# Patient Record
Sex: FEMALE | Race: BLACK OR AFRICAN AMERICAN | Employment: FULL TIME | URBAN - METROPOLITAN AREA
[De-identification: names, ages, dates, MRNs, and addresses within clinical notes are randomized per-mention and may not be internally consistent; named-entity substitution may affect disease eponyms.]

---

## 2017-01-24 ENCOUNTER — OFFICE VISIT (OUTPATIENT)
Dept: CARDIOLOGY CLINIC | Age: 47
End: 2017-01-24

## 2017-01-24 VITALS
HEIGHT: 62 IN | SYSTOLIC BLOOD PRESSURE: 108 MMHG | DIASTOLIC BLOOD PRESSURE: 72 MMHG | HEART RATE: 64 BPM | WEIGHT: 253 LBS | BODY MASS INDEX: 46.56 KG/M2 | OXYGEN SATURATION: 97 %

## 2017-01-24 DIAGNOSIS — I25.10 CORONARY ARTERY DISEASE INVOLVING NATIVE CORONARY ARTERY OF NATIVE HEART WITHOUT ANGINA PECTORIS: Primary | ICD-10-CM

## 2017-01-24 DIAGNOSIS — E78.5 DYSLIPIDEMIA: ICD-10-CM

## 2017-01-24 DIAGNOSIS — I10 HYPERTENSION, GOAL BELOW 140/80: ICD-10-CM

## 2017-01-24 NOTE — MR AVS SNAPSHOT
Visit Information Date & Time Provider Department Dept. Phone Encounter #  
 1/24/2017  4:00 PM Lois Joyner MD [facility] [id number] Follow-up Instructions Return in about 1 year (around 1/24/2018). Your Appointments 2/27/2017  9:30 AM  
Follow Up with Mychal Weber NP [address] [facility] ([address] [facility]) Appt Note: 3 month follow up  
 [address] [facility] [id number]  
[address] [facility] [id number] Upcoming Health Maintenance Date Due  
 EYE EXAM RETINAL OR DILATED Q1 1/21/1979 Pneumococcal 19-64 Medium Risk (1 of 1 - PPSV23) 1/21/1988 DTaP/Tdap/Td series (1 - Tdap) 1/21/1990 HEMOGLOBIN A1C Q6M 3/29/2016 MICROALBUMIN Q1 9/29/2016 PAP AKA CERVICAL CYTOLOGY 8/13/2017 LIPID PANEL Q1 10/25/2017 FOOT EXAM Q1 11/2/2017 Allergies as of 1/24/2017  Review Complete On: 1/24/2017 By: Brennan Hernandez Severity Noted Reaction Type Reactions Metformin  09/09/2015   Side Effect Nausea Only Current Immunizations  Never Reviewed Name Date Influenza Vaccine (Quad) PF 10/25/2016 Not reviewed this visit You Were Diagnosed With   
  
 Codes Comments Coronary artery disease involving native coronary artery of native heart without angina pectoris    -  Primary ICD-10-CM: I25.10 ICD-9-CM: 414.01 Vitals BP Pulse Height(growth percentile) Weight(growth percentile) SpO2 BMI  
 108/72 64 5' 2\" (1.575 m) 253 lb (114.8 kg) 97% 46.27 kg/m2 OB Status Smoking Status Having regular periods Never Smoker Vitals History BMI and BSA Data Body Mass Index Body Surface Area  
 46.27 kg/m 2 2.24 m 2 Preferred Pharmacy Pharmacy Name Phone [phone] [address] [phone] Your Updated Medication List  
  
   
 This list is accurate as of: 1/24/17  4:32 PM.  Always use your most recent med list.  
  
  
  
  
 aspirin delayed-release 81 mg tablet Take 1 Tab by mouth daily. dexlansoprazole 30 mg capsule Commonly known as:  DEXILANT Take 1 Cap by mouth daily as needed. docusate sodium 100 mg capsule Commonly known as:  Henreitta Sidney Take 1 Cap by mouth two (2) times a day. Indications: CONSTIPATION  
  
 ergocalciferol 50,000 unit capsule Commonly known as:  VITAMIN D2 Take 1 Cap by mouth every seven (7) days. magnesium oxide 400 mg tablet Commonly known as:  MAG-OX Take 1 Tab by mouth daily. metoprolol tartrate 25 mg tablet Commonly known as:  LOPRESSOR  
TAKE ONE-HALF TABLET BY MOUTH TWICE DAILY(GENERIC FOR LOPRESSOR)  
  
 multivitamin tablet Commonly known as:  ONE A DAY Take 1 Tab by mouth daily. naltrexone-buPROPion 8-90 mg Tber ER tablet Commonly known as:  Andrés Mano Take 2 Tabs by mouth two (2) times a day. Indications: WEIGHT LOSS MANAGEMENT FOR OBESE PATIENT (BMI >= 30)  
  
 nitroglycerin 0.4 mg SL tablet Commonly known as:  NITROSTAT Place 1 tablet under tongue at the start of chest pain every 5 minutes up to 3 doses. olmesartan-hydroCHLOROthiazide 20-12.5 mg per tablet Commonly known as:  BENICAR HCT Take 1 Tab by mouth daily. omega-3 acid ethyl esters 1 gram capsule Commonly known as:  Polina Maroon Take 1 Cap by mouth nightly. pitavastatin 4 mg Tab tablet Commonly known as:  LIVALO Take 1 Tab by mouth nightly. We Performed the Following AMB POC EKG ROUTINE W/ 12 LEADS, INTER & REP [57605 CPT(R)] Follow-up Instructions Return in about 1 year (around 1/24/2018). Introducing [facility]! Montez Erickson introduces [email or web address] patient portal. Now you can access parts of your medical record, email your doctor's office, and request medication refills online.    
 
1. In your internet browser, go to [email or web address] 2. Click on the First Time User? Click Here link in the Sign In box. You will see the New Member Sign Up page. 3. Enter your [employer] Access Code exactly as it appears below. You will not need to use this code after youve completed the sign-up process. If you do not sign up before the expiration date, you must request a new code. · [employer] Access Code: [email or web address] Expires: 1/31/2017  2:31 PM 
 
4. Enter the last four digits of your Social Security Number (xxxx) and Date of Birth (mm/dd/yyyy) as indicated and click Submit. You will be taken to the next sign-up page. 5. Create a [employer] ID. This will be your [employer] login ID and cannot be changed, so think of one that is secure and easy to remember. 6. Create a [employer] password. You can change your password at any time. 7. Enter your Password Reset Question and Answer. This can be used at a later time if you forget your password. 8. Enter your e-mail address. You will receive e-mail notification when new information is available in [address] 9. Click Sign Up. You can now view and download portions of your medical record. 10. Click the Download Summary menu link to download a portable copy of your medical information. If you have questions, please visit the Frequently Asked Questions section of the [employer] website. Remember, [employer] is NOT to be used for urgent needs. For medical emergencies, dial 911. Now available from your iPhone and Android! Please provide this summary of care documentation to your next provider. Your primary care clinician is listed as Tova Gore. If you have any questions after today's visit, please call [phone].

## 2017-01-24 NOTE — PROGRESS NOTES
1. Have you been to the ER, urgent care clinic since your last visit? Hospitalized since your last visit? S/p cath 11/22/16  2. Have you seen or consulted any other health care providers outside of the [address] [place] [address] since your last visit? Include any pap smears or colon screening.  NO

## 2017-01-24 NOTE — PROGRESS NOTES
HISTORY OF PRESENT ILLNESS  Yobani Hein is a 49 y.o. female. ASSESSMENT and PLAN    Ms. Portillo Cruz has history of CAD. She presented back in December 2011 with atypical chest pains but abnormal EKG. Her coronary angiogram revealed ostial 90-95% LAD stenosis. She subsequently underwent single vessel LIMA to LAD bypass surgery. Since her bypass surgery, she presented twice with chest pains to the emergency room which were again quite atypical. She had nuclear scans done both instances which were normal.  Back in November 2016, she presented to [facility] with chest pain. Nuclear scan showed ejection fraction of 60% but apical filling defect. Ultimately, repeat coronary angiography was performed. It was noted that her HECTOR to LAD was widely patent.  CAD:   Symptomatically stable.  BP:   Well-controlled.  HR:    Stable.  CHF:   Currently, there is no evidence of decompensated CHF noted.  Weight:   In January 2016, she weighed 276 pounds. With careful dieting, she was able lose some weight. Her weight today is 253 pounds.  Cholesterol:   Target LDL <70. She remains on Crestor 40 mg daily.  Anti-platelet:   Remains on ASA. I will see her back in one year. Thank you. Encounter Diagnoses   Name Primary?  Coronary artery disease involving native coronary artery of native heart without angina pectoris Yes    Hypertension, goal below 140/80     Dyslipidemia      current treatment plan is effective, no change in therapy  lab results and schedule of future lab studies reviewed with patient  reviewed diet, exercise and weight control  cardiovascular risk and specific lipid/LDL goals reviewed  use of aspirin to prevent MI and TIA's discussed      HPI   Today, Ms. Jeraline Halsted has no complaints of chest pains, exertional chest pains, increased shortness of breath, or decreased exercise capacity. She denies any orthopnea or PND.   She denies any palpitations or dizziness. Review of Systems   Respiratory: Negative for shortness of breath. Cardiovascular: Negative for chest pain, palpitations, orthopnea, claudication, leg swelling and PND. All other systems reviewed and are negative. Physical Exam   Constitutional: She is oriented to person, place, and time. She appears well-developed and well-nourished. HENT:   Head: Normocephalic. Eyes: Conjunctivae are normal.   Neck: Neck supple. No JVD present. Carotid bruit is not present. No thyromegaly present. Cardiovascular: Normal rate and regular rhythm. No murmur heard. Pulmonary/Chest: Breath sounds normal.   Abdominal: Bowel sounds are normal.   Musculoskeletal: She exhibits no edema. Neurological: She is alert and oriented to person, place, and time. Skin: Skin is warm and dry. Nursing note and vitals reviewed. PCP: Leda Medeiros NP    Past Medical History   Diagnosis Date    Abnormal PFT 10/1/2015     Dr. Noa Schroeder, Pulmonology    Acid reflux     CABG x 1 12/31/2011     LIMA-LAD    CAD (coronary artery disease)     Cardiac cath 11/23/2016     R dom. oLAD 85%. Otherwise patent coronaries. HECTOR to LAD patent. LVEDP 22 mmHg. EF 55%. No RWMA.  Cardiac echocardiogram 07/28/2015     Tech difficult. EF 50-55%. No WMA. Mild conc LVH. Gr 2 DDfx. RVSP 45-50 mmHg. Mild LAE. Mild ROSITA. Mod TR. Mild IVCE.  Cardiac nuclear imaging test, high risk 11/23/2016     High risk. Mainly fixed anteroapical defect w/partial reversibility. EF 60%. No RWMA. Nondiagnostic EKG on pharm stress test.    Cardiovascular upper extremity arterial duplex 01/03/2014     No significant occlusive arterial disease bilaterally.     Coronary atherosclerosis of native coronary artery     Diabetes mellitus ([facility] [address] 9/22/14     hgbA1C 6.8    Dyslipidemia     GERD (gastroesophageal reflux disease)     H/O screening mammography 12/06/2016     no evidence of malignancy    Hypomagnesemia 14     1.4    Pleural effusion, left      s/p CABG and thoracentesis with removal of 900 mL    Pulmonary arterial hypertension (HCC)        Past Surgical History   Procedure Laterality Date    Hx  section      Hx coronary artery bypass graft  12/31/2011     x1    Pr cardiac surg procedure unlist      Cardiac catheterization  2016          Coronary artery angiogram  2016          Lv angiography  2016             Current Outpatient Prescriptions   Medication Sig Dispense Refill    metoprolol tartrate (LOPRESSOR) 25 mg tablet TAKE ONE-HALF TABLET BY MOUTH TWICE DAILY(GENERIC FOR LOPRESSOR) 30 Tab 3    magnesium oxide (MAG-OX) 400 mg tablet Take 1 Tab by mouth daily. 30 Tab 11    dexlansoprazole (DEXILANT) 30 mg capsule Take 1 Cap by mouth daily as needed. 90 Cap 3    ergocalciferol (VITAMIN D2) 50,000 unit capsule Take 1 Cap by mouth every seven (7) days. 4 Cap 11    olmesartan-hydroCHLOROthiazide (BENICAR HCT) 20-12.5 mg per tablet Take 1 Tab by mouth daily. 90 Tab 3    naltrexone-buPROPion (CONTRAVE) 8-90 mg TbER ER tablet Take 2 Tabs by mouth two (2) times a day. Indications: WEIGHT LOSS MANAGEMENT FOR OBESE PATIENT (BMI >= 30) 360 Tab 3    pitavastatin (LIVALO) 4 mg tab tablet Take 1 Tab by mouth nightly. 90 Tab 3    docusate sodium (COLACE) 100 mg capsule Take 1 Cap by mouth two (2) times a day. Indications: CONSTIPATION 60 Cap 11    nitroglycerin (NITROSTAT) 0.4 mg SL tablet Place 1 tablet under tongue at the start of chest pain every 5 minutes up to 3 doses. 1 Bottle 3    aspirin delayed-release 81 mg tablet Take 1 Tab by mouth daily. 30 Tab 11    multivitamin (ONE A DAY) tablet Take 1 Tab by mouth daily. 30 Tab 11    omega-3 acid ethyl esters (LOVAZA) 1 gram capsule Take 1 Cap by mouth nightly.  90 Cap 3       The patient has a family history of    Social History   Substance Use Topics    Smoking status: Never Smoker    Smokeless tobacco: Never Used Comment: 2nd home smoking from mother during 1st 17 yrs of life    Alcohol use No       Lab Results   Component Value Date/Time    Cholesterol, total 141 10/25/2016 09:50 AM    Cholesterol, Total 137 08/18/2014 07:38 AM    HDL Cholesterol 57 10/25/2016 09:50 AM    HDL Cholesterol 59 08/18/2014 07:38 AM    LDL, calculated 69.4 10/25/2016 09:50 AM    LDL Cholesterol 65 08/18/2014 07:38 AM    Triglyceride 73 10/25/2016 09:50 AM    CHOL/HDL Ratio 2.5 10/25/2016 09:50 AM        BP Readings from Last 3 Encounters:   01/24/17 108/72   11/30/16 112/73   11/24/16 108/69        Pulse Readings from Last 3 Encounters:   01/24/17 64   11/30/16 65   11/24/16 63       Wt Readings from Last 3 Encounters:   01/24/17 114.8 kg (253 lb)   11/30/16 116.1 kg (256 lb)   11/23/16 116.5 kg (256 lb 13.4 oz)         EKG: unchanged from previous tracings, normal sinus rhythm, nonspecific ST and T waves changes, with T-wave inversions mostly laterally.

## 2017-02-02 ENCOUNTER — TELEPHONE (OUTPATIENT)
Dept: FAMILY MEDICINE CLINIC | Age: 47
End: 2017-02-02

## 2017-02-02 NOTE — TELEPHONE ENCOUNTER
Medication: Lovaza 1gm, dose: 1 caps, how often: QD, current number of medication days provided: 90, refill per application. Lot #: [id number], EXP 02/2018  . This medication was received and verified for the following 1. Correct Patient, 2. Correct Diagnosis, 3. Correct Drug, 4. Correct route, and no current allergy to medication. Medication: Livalo , dose: 4 mg, how often: Daily , current number of medication days provided: 90, refill per application. Lot #: [id number], EXP 02/2018. This medication was received and verified for the following 1. Correct Patient, 2. Correct Diagnosis, 3. Correct Drug, 4. Correct route, and no current allergy to medication. Please contact patient to come  their medications.      Shaheen Carrillo, MSN, RN, [facility]

## 2017-02-07 ENCOUNTER — OFFICE VISIT (OUTPATIENT)
Dept: FAMILY MEDICINE CLINIC | Age: 47
End: 2017-02-07

## 2017-02-07 ENCOUNTER — HOSPITAL ENCOUNTER (OUTPATIENT)
Dept: LAB | Age: 47
Discharge: HOME | End: 2017-02-07
Payer: COMMERCIAL

## 2017-02-07 VITALS
HEIGHT: 62 IN | TEMPERATURE: 98.3 F | BODY MASS INDEX: 46.33 KG/M2 | SYSTOLIC BLOOD PRESSURE: 110 MMHG | RESPIRATION RATE: 16 BRPM | DIASTOLIC BLOOD PRESSURE: 76 MMHG | HEART RATE: 73 BPM | WEIGHT: 251.8 LBS | OXYGEN SATURATION: 96 %

## 2017-02-07 DIAGNOSIS — K21.9 GASTROESOPHAGEAL REFLUX DISEASE WITHOUT ESOPHAGITIS: ICD-10-CM

## 2017-02-07 DIAGNOSIS — I10 ESSENTIAL HYPERTENSION: ICD-10-CM

## 2017-02-07 DIAGNOSIS — E11.9 TYPE 2 DIABETES MELLITUS WITHOUT COMPLICATION, WITHOUT LONG-TERM CURRENT USE OF INSULIN (HCC): ICD-10-CM

## 2017-02-07 DIAGNOSIS — N91.2 AMENORRHEA: ICD-10-CM

## 2017-02-07 DIAGNOSIS — E55.9 VITAMIN D DEFICIENCY: ICD-10-CM

## 2017-02-07 DIAGNOSIS — I10 HYPERTENSION, GOAL BELOW 140/80: ICD-10-CM

## 2017-02-07 DIAGNOSIS — E78.5 DYSLIPIDEMIA: ICD-10-CM

## 2017-02-07 DIAGNOSIS — E11.9 TYPE 2 DIABETES MELLITUS WITHOUT COMPLICATION, WITHOUT LONG-TERM CURRENT USE OF INSULIN (HCC): Primary | ICD-10-CM

## 2017-02-07 LAB
EST. AVERAGE GLUCOSE BLD GHB EST-MCNC: 140 MG/DL
HBA1C MFR BLD: 6.5 % (ref 4.2–5.6)
HCG URINE, QL. (POC): NEGATIVE
VALID INTERNAL CONTROL?: YES

## 2017-02-07 PROCEDURE — 83036 HEMOGLOBIN GLYCOSYLATED A1C: CPT | Performed by: NURSE PRACTITIONER

## 2017-02-07 PROCEDURE — 82043 UR ALBUMIN QUANTITATIVE: CPT | Performed by: NURSE PRACTITIONER

## 2017-02-07 PROCEDURE — 82306 VITAMIN D 25 HYDROXY: CPT | Performed by: NURSE PRACTITIONER

## 2017-02-07 RX ORDER — DEXLANSOPRAZOLE 30 MG/1
30 CAPSULE, DELAYED RELEASE ORAL
Qty: 90 CAP | Refills: 1 | Status: SHIPPED | OUTPATIENT
Start: 2017-02-07 | End: 2017-12-16 | Stop reason: SDUPTHER

## 2017-02-07 RX ORDER — OLMESARTAN MEDOXOMIL AND HYDROCHLOROTHIAZIDE 20/12.5 20; 12.5 MG/1; MG/1
1 TABLET ORAL DAILY
Qty: 90 TAB | Refills: 3 | Status: SHIPPED | OUTPATIENT
Start: 2017-02-07 | End: 2018-02-13 | Stop reason: SDUPTHER

## 2017-02-07 NOTE — PROGRESS NOTES
SUBJECTIVE:  49 y.o. female for follow up of diabetes transfer patient from [facility]. Diabetic Review of Systems - medication compliance: compliant all of the time, diabetic diet compliance: compliant most of the time, home glucose monitoring: is not performed, further diabetic ROS: no polyuria or polydipsia, no chest pain, dyspnea or TIA's, no numbness, tingling or pain in extremities, last eye exam approximately overdue will put in referral today and patient will make own appointment. acute symptoms are none. Other symptoms and concerns: no menses. Patient is managed by cardiology for CAD   Patient also has history of hypovitaminosis d   Patient has history GERD  Current Outpatient Prescriptions   Medication Sig Dispense Refill    metoprolol tartrate (LOPRESSOR) 25 mg tablet TAKE ONE-HALF TABLET BY MOUTH TWICE DAILY(GENERIC FOR LOPRESSOR) 30 Tab 3    magnesium oxide (MAG-OX) 400 mg tablet Take 1 Tab by mouth daily. 30 Tab 11    dexlansoprazole (DEXILANT) 30 mg capsule Take 1 Cap by mouth daily as needed. 90 Cap 3    ergocalciferol (VITAMIN D2) 50,000 unit capsule Take 1 Cap by mouth every seven (7) days. 4 Cap 11    olmesartan-hydroCHLOROthiazide (BENICAR HCT) 20-12.5 mg per tablet Take 1 Tab by mouth daily. 90 Tab 3    naltrexone-buPROPion (CONTRAVE) 8-90 mg TbER ER tablet Take 2 Tabs by mouth two (2) times a day. Indications: WEIGHT LOSS MANAGEMENT FOR OBESE PATIENT (BMI >= 30) 360 Tab 3    pitavastatin (LIVALO) 4 mg tab tablet Take 1 Tab by mouth nightly. 90 Tab 3    docusate sodium (COLACE) 100 mg capsule Take 1 Cap by mouth two (2) times a day. Indications: CONSTIPATION 60 Cap 11    nitroglycerin (NITROSTAT) 0.4 mg SL tablet Place 1 tablet under tongue at the start of chest pain every 5 minutes up to 3 doses. 1 Bottle 3    aspirin delayed-release 81 mg tablet Take 1 Tab by mouth daily. 30 Tab 11    multivitamin (ONE A DAY) tablet Take 1 Tab by mouth daily.  30 Tab 11    omega-3 acid ethyl esters (LOVAZA) 1 gram capsule Take 1 Cap by mouth nightly. 90 Cap 3     Wt Readings from Last 3 Encounters:   02/07/17 251 lb 12.8 oz (114.2 kg)   01/24/17 253 lb (114.8 kg)   11/30/16 256 lb (116.1 kg)     BP Readings from Last 3 Encounters:   02/07/17 110/76   01/24/17 108/72   11/30/16 112/73       OBJECTIVE:  Awake and alert in no acute distress  Neck supple without lymphadenopathy, no carotid artery bruits auscultated bilaterally. No thyromegaly  Lungs clear throughout  S1 S2 RRR without ectopy or murmur auscultated. Extremities: no clubbing, cyanosis, peripheral edema  Integumentary: no rashes  Reviewed vital signs   Visit Vitals    /76 (BP 1 Location: Right arm, BP Patient Position: Sitting)    Pulse 73    Temp 98.3 °F (36.8 °C) (Oral)    Resp 16    Ht 5' 2\" (1.575 m)    Wt 251 lb 12.8 oz (114.2 kg)    SpO2 96%    BMI 46.05 kg/m2       Results for orders placed or performed in visit on 02/07/17   AMB POC URINE PREGNANCY TEST, VISUAL COLOR COMPARISON   Result Value Ref Range    VALID INTERNAL CONTROL POC Yes     HCG urine, Ql. (POC) Negative Negative       Donnie Soulier was seen today for diabetes and missed menses. Diagnoses and all orders for this visit:    Type 2 diabetes mellitus without complication, without long-term current use of insulin ([facility])  -      DIABETES EYE EXAM  -     MICROALBUMIN, UR, RAND W/ MICROALBUMIN/CREA RATIO; Future  -     Cancel: AMB POC HEMOGLOBIN A1C  -     HEMOGLOBIN A1C WITH EAG; Future  -     olmesartan-hydroCHLOROthiazide (BENICAR HCT) 20-12.5 mg per tablet; Take 1 Tab by mouth daily.  -     LIPID PANEL; Future    Amenorrhea  -     AMB POC URINE PREGNANCY TEST, VISUAL COLOR COMPARISON    Vitamin D deficiency  -     VITAMIN D, 25 HYDROXY; Future    Hypertension, goal below 140/80  -     olmesartan-hydroCHLOROthiazide (BENICAR HCT) 20-12.5 mg per tablet; Take 1 Tab by mouth daily.     Gastroesophageal reflux disease without esophagitis  -     dexlansoprazole (DEXILANT) 30 mg capsule; Take 1 Cap by mouth daily as needed. Dyslipidemia  -     pitavastatin (LIVALO) 4 mg tab tablet; Take 1 Tab by mouth nightly. Essential hypertension  -     olmesartan-hydroCHLOROthiazide (BENICAR HCT) 20-12.5 mg per tablet; Take 1 Tab by mouth daily. Reinforced ADA diet and exercise. Portion control and exercise healthy eating general guidelines reviewed with patient to get closer to normal BMI  Health maintenance reviewed  Patient verbalizes understanding. I have discussed the diagnosis with the patient and the intended plan as seen in the above orders. The patient has received an after-visit summary and questions were answered concerning future plans. I have discussed medication side effects and warnings with the patient as well. Follow-up Disposition:  Return for schedule [facility] with Rocio

## 2017-02-07 NOTE — PROGRESS NOTES
1. Have you been to the ER, urgent care clinic since your last visit? Hospitalized since your last visit? No    2. Have you seen or consulted any other health care providers outside of the [address] [place] [address] since your last visit? Include any pap smears or colon screening. No    3. Would you like to have a Flu Shot Today?  No already had one

## 2017-02-07 NOTE — MR AVS SNAPSHOT
Visit Information Date & Time Provider Department Dept. Phone Encounter #  
 2/7/2017  9:40 AM Kera Leo, [address] [phone] Follow-up Instructions Return for schedule [facility] with [facility]. Your Appointments 2/27/2017  9:30 AM  
Follow Up with Lamin Underwood, ERICK [address] [facility] ([address]) Appt Note: 3 month follow up  
 [address] [facility] [phone]  
[address] [facility] [id number] Upcoming Health Maintenance Date Due  
 EYE EXAM RETINAL OR DILATED Q1 1/21/1979 Pneumococcal 19-64 Medium Risk (1 of 1 - PPSV23) 1/21/1988 DTaP/Tdap/Td series (1 - Tdap) 1/21/1990 HEMOGLOBIN A1C Q6M 3/29/2016 MICROALBUMIN Q1 9/29/2016 PAP AKA CERVICAL CYTOLOGY 8/13/2017 LIPID PANEL Q1 10/25/2017 FOOT EXAM Q1 11/2/2017 Allergies as of 2/7/2017  Review Complete On: 2/7/2017 By: Kera Leo NP Severity Noted Reaction Type Reactions Metformin  09/09/2015   Side Effect Nausea Only Current Immunizations  Reviewed on 2/7/2017 Name Date Influenza Vaccine (Quad) PF 10/25/2016 Reviewed by Kera Leo NP on 2/7/2017 at 10:09 AM  
You Were Diagnosed With   
  
 Codes Comments Type 2 diabetes mellitus without complication, without long-term current use of insulin (HCC)    -  Primary ICD-10-CM: E11.9 ICD-9-CM: 250.00 Amenorrhea     ICD-10-CM: N91.2 ICD-9-CM: 626.0 Vitamin D deficiency     ICD-10-CM: E55.9 ICD-9-CM: 268.9 Hypertension, goal below 140/80     ICD-10-CM: I10 
ICD-9-CM: 401.9 Gastroesophageal reflux disease without esophagitis     ICD-10-CM: K21.9 ICD-9-CM: 530.81 Dyslipidemia     ICD-10-CM: E78.5 ICD-9-CM: 272.4 Essential hypertension     ICD-10-CM: I10 
ICD-9-CM: 401.9 Vitals BP Pulse Temp Resp Height(growth percentile) Weight(growth percentile) 110/76 (BP 1 Location: Right arm, BP Patient Position: Sitting) 73 98.3 °F (36.8 °C) (Oral) 16 5' 2\" (1.575 m) 251 lb 12.8 oz (114.2 kg) LMP SpO2 BMI OB Status Smoking Status 02/01/2016 (Exact Date) 96% 46.05 kg/m2 Having regular periods Never Smoker BMI and BSA Data Body Mass Index Body Surface Area 46.05 kg/m 2 2.24 m 2 Preferred Pharmacy Pharmacy Name Phone [facility] [address] [phone] Your Updated Medication List  
  
   
This list is accurate as of: 2/7/17 10:09 AM.  Always use your most recent med list.  
  
  
  
  
 aspirin delayed-release 81 mg tablet Take 1 Tab by mouth daily. dexlansoprazole 30 mg capsule Commonly known as:  DEXILANT Take 1 Cap by mouth daily as needed. docusate sodium 100 mg capsule Commonly known as:  [place] Take 1 Cap by mouth two (2) times a day. Indications: CONSTIPATION  
  
 magnesium oxide 400 mg tablet Commonly known as:  MAG-OX Take 1 Tab by mouth daily. metoprolol tartrate 25 mg tablet Commonly known as:  LOPRESSOR  
TAKE ONE-HALF TABLET BY MOUTH TWICE DAILY(GENERIC FOR LOPRESSOR)  
  
 multivitamin tablet Commonly known as:  ONE A DAY Take 1 Tab by mouth daily. naltrexone-buPROPion 8-90 mg Tber ER tablet Commonly known as:  Gabby Ro Take 2 Tabs by mouth two (2) times a day. Indications: WEIGHT LOSS MANAGEMENT FOR OBESE PATIENT (BMI >= 30)  
  
 nitroglycerin 0.4 mg SL tablet Commonly known as:  NITROSTAT Place 1 tablet under tongue at the start of chest pain every 5 minutes up to 3 doses. olmesartan-hydroCHLOROthiazide 20-12.5 mg per tablet Commonly known as:  BENICAR HCT Take 1 Tab by mouth daily. omega-3 acid ethyl esters 1 gram capsule Commonly known as:  Anthonette Rolling Take 1 Cap by mouth nightly. pitavastatin 4 mg Tab tablet Commonly known as:  LIVALO Take 1 Tab by mouth nightly. Prescriptions Sent to Pharmacy Refills  
 olmesartan-hydroCHLOROthiazide (BENICAR HCT) 20-12.5 mg per tablet 3 Sig: Take 1 Tab by mouth daily. Class: Normal  
 Pharmacy: [place] [address]. Ph #: [phone] Route: Oral  
 dexlansoprazole (DEXILANT) 30 mg capsule 1 Sig: Take 1 Cap by mouth daily as needed. Class: Normal  
 Pharmacy: [place] [id number]. Ph #: [phone] Route: Oral  
 pitavastatin (LIVALO) 4 mg tab tablet 1 Sig: Take 1 Tab by mouth nightly. Class: Normal  
 Pharmacy: [place] [address]. Ph #: [phone] Route: Oral  
  
We Performed the Following AMB POC URINE PREGNANCY TEST, VISUAL COLOR COMPARISON [53257 CPT(R)]  DIABETES EYE EXAM [6 Custom] REFERRAL TO OPTOMETRY [id number] Custom] Comments:  
 Please evaluate patient for annual diabetic eye examination [facility] Amrit Croft location Patient will make appointment. Follow-up Instructions Return for schedule SANDRA with Roc[facility] To-Do List   
 02/07/2017 Lab:  HEMOGLOBIN A1C WITH EAG   
  
 02/07/2017 Lab:  LIPID PANEL   
  
 02/07/2017 Lab:  MICROALBUMIN, UR, RAND W/ MICROALBUMIN/CREA RATIO   
  
 02/07/2017 Lab:  VITAMIN D, 25 HYDROXY Referral Information Referral ID Referred By Referred To  
  
 [id number] [facility] Cheung Not Available Visits Status Start Date End Date 1 New Request 2/7/17 2/7/18 If your referral has a status of pending review or denied, additional information will be sent to support the outcome of this decision. Patient Instructions Body Mass Index: Care Instructions Your Care Instructions Body mass index (BMI) can help you see if your weight is raising your risk for health problems. It uses a formula to compare how much you weigh with how tall you are. A BMI between 18.5 and 24.9 is considered healthy. A BMI between 25 and 29.9 is considered overweight. A BMI of 30 or higher is considered obese. If your BMI is in the normal range, it means that you have a lower risk for weight-related health problems. If your BMI is in the overweight or obese range, you may be at increased risk for weight-related health problems, such as high blood pressure, heart disease, stroke, arthritis or joint pain, and diabetes. BMI is just one measure of your risk for weight-related health problems. You may be at higher risk for health problems if you are not active, you eat an unhealthy diet, or you drink too much alcohol or use tobacco products. Follow-up care is a key part of your treatment and safety. Be sure to make and go to all appointments, and call your doctor if you are having problems. It's also a good idea to know your test results and keep a list of the medicines you take. How can you care for yourself at home? · Practice healthy eating habits. This includes eating plenty of fruits, vegetables, whole grains, lean protein, and low-fat dairy. · Get at least 30 minutes of exercise 5 days a week or more. Brisk walking is a good choice. You also may want to do other activities, such as running, swimming, cycling, or playing tennis or team sports. · Do not smoke. Smoking can increase your risk for health problems. If you need help quitting, talk to your doctor about stop-smoking programs and medicines. These can increase your chances of quitting for good. · Limit alcohol to 2 drinks a day for men and 1 drink a day for women. Too much alcohol can cause health problems. If you have a BMI higher than 25 · Your doctor may do other tests to check your risk for weight-related health problems.  This may include measuring the distance around your waist. A waist measurement of more than 40 inches in men or 35 inches in women can increase the risk of weight-related health problems. · Talk with your doctor about steps you can take to stay healthy or improve your health. You may need to make lifestyle changes to lose weight and stay healthy, such as changing your diet and getting regular exercise. Where can you learn more? Go to http://yane-cooper.info/. Enter S176 in the search box to learn more about \"Body Mass Index: Care Instructions. \" Current as of: February 16, 2016 Content Version: 11.1 © 2099-1452 [employer]. Care instructions adapted under license by [employer] (which disclaims liability or warranty for this information). If you have questions about a medical condition or this instruction, always ask your healthcare professional. [address] any warranty or liability for your use of this information. Nutrition Tips for Diabetes: After Your Visit Your Care Instructions A healthy diet is important to manage diabetes. It helps you lose weight (if you need to) and keep it off. It gives you the nutrition and energy your body needs and helps prevent heart disease. But a diet for diabetes does not mean that you have to eat special foods. You can eat what your family eats, including occasional sweets and other favorites. But you do have to pay attention to how often you eat and how much you eat of certain foods. The right plan for you will give you meals that help you keep your blood sugar at healthy levels. Try to eat a variety of foods and to spread carbohydrate throughout the day. Carbohydrate raises blood sugar higher and more quickly than any other nutrient does. Carbohydrate is found in sugar, breads and cereals, fruit, starchy vegetables such as potatoes and corn, and milk and yogurt.  
You may want to work with a dietitian or diabetes educator to help you plan meals and snacks. A dietitian or diabetes educator also can help you lose weight if that is one of your goals. The following tips can help you enjoy your meals and stay healthy. Follow-up care is a key part of your treatment and safety. Be sure to make and go to all appointments, and call your doctor if you are having problems. Its also a good idea to know your test results and keep a list of the medicines you take. How can you care for yourself at home? · Learn which foods have carbohydrate and how much carbohydrate to eat. A dietitian or diabetes educator can help you learn to keep track of how much carbohydrate you eat. · Spread carbohydrate throughout the day. Eat some carbohydrate at all meals, but do not eat too much at any one time. · Plan meals to include food from all the food groups. These are the food groups and some example portion sizes: ¨ Grains: 1 slice of bread (1 ounce), ½ cup of cooked cereal, and 1/3 cup of cooked pasta or rice. These have about 15 grams of carbohydrate in a serving. Choose whole grains such as whole wheat bread or crackers, oatmeal, and brown rice more often than refined grains. ¨ Fruit: 1 small fresh fruit, such as an apple or orange; ½ of a banana; ½ cup of chopped, cooked, or canned fruit; ½ cup of fruit juice; 1 cup of melon or raspberries; and 2 tablespoons of dried fruit. These have about 15 grams of carbohydrate in a serving. ¨ Dairy: 1 cup of nonfat or low-fat milk and 2/3 cup of plain yogurt. These have about 15 grams of carbohydrate in a serving. ¨ Protein foods: Beef, chicken, turkey, fish, eggs, tofu, cheese, cottage cheese, and peanut butter. A serving size of meat is 3 ounces, which is about the size of a deck of cards. Examples of meat substitute serving sizes (equal to 1 ounce of meat) are 1/4 cup of cottage cheese, 1 egg, 1 tablespoon of peanut butter, and ½ cup of tofu. These have very little or no carbohydrate per serving. ¨ Vegetables: Starchy vegetables such as ½ cup of cooked dried beans, peas, potatoes, or corn have about 15 grams of carbohydrate. Nonstarchy vegetables have very little carbohydrate, such as 1 cup of raw leafy vegetables (such as spinach), ½ cup of other vegetables (cooked or chopped), and 3/4 cup of vegetable juice. · Use the plate format to plan meals. It is a good, quick way to make sure that you have a balanced meal. It also helps you spread carbohydrate throughout the day. You divide your plate by types of foods. Put vegetables on half the plate, meat or meat substitutes on one-quarter of the plate, and a grain or starchy vegetable (such as brown rice or a potato) in the final quarter of the plate. To this you can add a small piece of fruit and 1 cup of milk or yogurt, depending on how much carbohydrate you are supposed to eat at a meal. 
· Talk to your dietitian or diabetes educator about ways to add limited amounts of sweets into your meal plan. You can eat these foods now and then, as long as you include the amount of carbohydrate they have in your daily carbohydrate allowance. · If you drink alcohol, limit it to no more than 1 drink a day for women and 2 drinks a day for men. If you are pregnant, no amount of alcohol is known to be safe. · Protein, fat, and fiber do not raise blood sugar as much as carbohydrate does. If you eat a lot of these nutrients in a meal, your blood sugar will rise more slowly than it would otherwise. · Limit saturated fats, such as those from meat and dairy products. Try to replace it with monounsaturated fat, such as olive oil. This is a healthier choice because people who have diabetes are at higher-than-average risk of heart disease. But use a modest amount of olive oil. A tablespoon of olive oil has 14 grams of fat and 120 calories. · Exercise lowers blood sugar.  If you take insulin by shots or pump, you can use less than you would if you were not exercising. Keep in mind that timing matters. If you exercise within 1 hour after a meal, your body may need less insulin for that meal than it would if you exercised 3 hours after the meal. Test your blood sugar to find out how exercise affects your need for insulin. · Exercise on most days of the week. Aim for at least 30 minutes. Exercise helps you stay at a healthy weight and helps your body use insulin. Walking is an easy way to get exercise. Gradually increase the amount you walk every day. You also may want to swim, bike, or do other activities. When you eat out · Learn to estimate the serving sizes of foods that have carbohydrate. If you measure food at home, it will be easier to estimate the amount in a serving of restaurant food. · If the meal you order has too much carbohydrate (such as potatoes, corn, or baked beans), ask to have a low-carbohydrate food instead. Ask for a salad or green vegetables. · If you use insulin, check your blood sugar before and after eating out to help you plan how much to eat in the future. · If you eat more carbohydrate at a meal than you had planned, take a walk or do other exercise. This will help lower your blood sugar. Where can you learn more? Go to [employer].be Enter X324 in the search box to learn more about \"Nutrition Tips for Diabetes: After Your Visit. \"  
© 0222-0289 [employer]. Care instructions adapted under license by Óscar Pollock (which disclaims liability or warranty for this information). This care instruction is for use with your licensed healthcare professional. If you have questions about a medical condition or this instruction, always ask your healthcare professional. [place] [address] any warranty or liability for your use of this information. Content Version: 02.7.873086; Current as of: June 4, 2014 Introducing [facility]! [facility] introduces [employer] patient portal. Now you can access parts of your medical record, email your doctor's office, and request medication refills online. 1. In your internet browser, go to [email or web address] 2. Click on the First Time User? Click Here link in the Sign In box. You will see the New Member Sign Up page. 3. Enter your [employer] Access Code exactly as it appears below. You will not need to use this code after youve completed the sign-up process. If you do not sign up before the expiration date, you must request a new code. · [employer] Access Code: [email or web address] Expires: 5/8/2017 10:09 AM 
 
4. Enter the last four digits of your Social Security Number (xxxx) and Date of Birth (mm/dd/yyyy) as indicated and click Submit. You will be taken to the next sign-up page. 5. Create a [employer] ID. This will be your [employer] login ID and cannot be changed, so think of one that is secure and easy to remember. 6. Create a [employer] password. You can change your password at any time. 7. Enter your Password Reset Question and Answer. This can be used at a later time if you forget your password. 8. Enter your e-mail address. You will receive e-mail notification when new information is available in [address] 9. Click Sign Up. You can now view and download portions of your medical record. 10. Click the Download Summary menu link to download a portable copy of your medical information. If you have questions, please visit the Frequently Asked Questions section of the [employer] website. Remember, [employer] is NOT to be used for urgent needs. For medical emergencies, dial 911. Now available from your iPhone and Android! Please provide this summary of care documentation to your next provider. Your primary care clinician is listed as [address]. If you have any questions after today's visit, please call [phone].

## 2017-02-07 NOTE — PATIENT INSTRUCTIONS
Body Mass Index: Care Instructions  Your Care Instructions    Body mass index (BMI) can help you see if your weight is raising your risk for health problems. It uses a formula to compare how much you weigh with how tall you are. A BMI between 18.5 and 24.9 is considered healthy. A BMI between 25 and 29.9 is considered overweight. A BMI of 30 or higher is considered obese. If your BMI is in the normal range, it means that you have a lower risk for weight-related health problems. If your BMI is in the overweight or obese range, you may be at increased risk for weight-related health problems, such as high blood pressure, heart disease, stroke, arthritis or joint pain, and diabetes. BMI is just one measure of your risk for weight-related health problems. You may be at higher risk for health problems if you are not active, you eat an unhealthy diet, or you drink too much alcohol or use tobacco products. Follow-up care is a key part of your treatment and safety. Be sure to make and go to all appointments, and call your doctor if you are having problems. It's also a good idea to know your test results and keep a list of the medicines you take. How can you care for yourself at home? · Practice healthy eating habits. This includes eating plenty of fruits, vegetables, whole grains, lean protein, and low-fat dairy. · Get at least 30 minutes of exercise 5 days a week or more. Brisk walking is a good choice. You also may want to do other activities, such as running, swimming, cycling, or playing tennis or team sports. · Do not smoke. Smoking can increase your risk for health problems. If you need help quitting, talk to your doctor about stop-smoking programs and medicines. These can increase your chances of quitting for good. · Limit alcohol to 2 drinks a day for men and 1 drink a day for women. Too much alcohol can cause health problems.   If you have a BMI higher than 25  · Your doctor may do other tests to check your risk for weight-related health problems. This may include measuring the distance around your waist. A waist measurement of more than 40 inches in men or 35 inches in women can increase the risk of weight-related health problems. · Talk with your doctor about steps you can take to stay healthy or improve your health. You may need to make lifestyle changes to lose weight and stay healthy, such as changing your diet and getting regular exercise. Where can you learn more? Go to http://yane-cooper.info/. Enter S176 in the search box to learn more about \"Body Mass Index: Care Instructions. \"  Current as of: February 16, 2016  Content Version: 11.1  © 3294-1611 [employer]. Care instructions adapted under license by [employer] (which disclaims liability or warranty for this information). If you have questions about a medical condition or this instruction, always ask your healthcare professional. [address] any warranty or liability for your use of this information. Nutrition Tips for Diabetes: After Your Visit  Your Care Instructions  A healthy diet is important to manage diabetes. It helps you lose weight (if you need to) and keep it off. It gives you the nutrition and energy your body needs and helps prevent heart disease. But a diet for diabetes does not mean that you have to eat special foods. You can eat what your family eats, including occasional sweets and other favorites. But you do have to pay attention to how often you eat and how much you eat of certain foods. The right plan for you will give you meals that help you keep your blood sugar at healthy levels. Try to eat a variety of foods and to spread carbohydrate throughout the day. Carbohydrate raises blood sugar higher and more quickly than any other nutrient does.  Carbohydrate is found in sugar, breads and cereals, fruit, starchy vegetables such as potatoes and corn, and milk and yogurt. You may want to work with a dietitian or diabetes educator to help you plan meals and snacks. A dietitian or diabetes educator also can help you lose weight if that is one of your goals. The following tips can help you enjoy your meals and stay healthy. Follow-up care is a key part of your treatment and safety. Be sure to make and go to all appointments, and call your doctor if you are having problems. Its also a good idea to know your test results and keep a list of the medicines you take. How can you care for yourself at home? · Learn which foods have carbohydrate and how much carbohydrate to eat. A dietitian or diabetes educator can help you learn to keep track of how much carbohydrate you eat. · Spread carbohydrate throughout the day. Eat some carbohydrate at all meals, but do not eat too much at any one time. · Plan meals to include food from all the food groups. These are the food groups and some example portion sizes:  ¨ Grains: 1 slice of bread (1 ounce), ½ cup of cooked cereal, and 1/3 cup of cooked pasta or rice. These have about 15 grams of carbohydrate in a serving. Choose whole grains such as whole wheat bread or crackers, oatmeal, and brown rice more often than refined grains. ¨ Fruit: 1 small fresh fruit, such as an apple or orange; ½ of a banana; ½ cup of chopped, cooked, or canned fruit; ½ cup of fruit juice; 1 cup of melon or raspberries; and 2 tablespoons of dried fruit. These have about 15 grams of carbohydrate in a serving. ¨ Dairy: 1 cup of nonfat or low-fat milk and 2/3 cup of plain yogurt. These have about 15 grams of carbohydrate in a serving. ¨ Protein foods: Beef, chicken, turkey, fish, eggs, tofu, cheese, cottage cheese, and peanut butter. A serving size of meat is 3 ounces, which is about the size of a deck of cards.  Examples of meat substitute serving sizes (equal to 1 ounce of meat) are 1/4 cup of cottage cheese, 1 egg, 1 tablespoon of peanut butter, and ½ cup of tofu. These have very little or no carbohydrate per serving. ¨ Vegetables: Starchy vegetables such as ½ cup of cooked dried beans, peas, potatoes, or corn have about 15 grams of carbohydrate. Nonstarchy vegetables have very little carbohydrate, such as 1 cup of raw leafy vegetables (such as spinach), ½ cup of other vegetables (cooked or chopped), and 3/4 cup of vegetable juice. · Use the plate format to plan meals. It is a good, quick way to make sure that you have a balanced meal. It also helps you spread carbohydrate throughout the day. You divide your plate by types of foods. Put vegetables on half the plate, meat or meat substitutes on one-quarter of the plate, and a grain or starchy vegetable (such as brown rice or a potato) in the final quarter of the plate. To this you can add a small piece of fruit and 1 cup of milk or yogurt, depending on how much carbohydrate you are supposed to eat at a meal.  · Talk to your dietitian or diabetes educator about ways to add limited amounts of sweets into your meal plan. You can eat these foods now and then, as long as you include the amount of carbohydrate they have in your daily carbohydrate allowance. · If you drink alcohol, limit it to no more than 1 drink a day for women and 2 drinks a day for men. If you are pregnant, no amount of alcohol is known to be safe. · Protein, fat, and fiber do not raise blood sugar as much as carbohydrate does. If you eat a lot of these nutrients in a meal, your blood sugar will rise more slowly than it would otherwise. · Limit saturated fats, such as those from meat and dairy products. Try to replace it with monounsaturated fat, such as olive oil. This is a healthier choice because people who have diabetes are at higher-than-average risk of heart disease. But use a modest amount of olive oil. A tablespoon of olive oil has 14 grams of fat and 120 calories. · Exercise lowers blood sugar.  If you take insulin by shots or pump, you can use less than you would if you were not exercising. Keep in mind that timing matters. If you exercise within 1 hour after a meal, your body may need less insulin for that meal than it would if you exercised 3 hours after the meal. Test your blood sugar to find out how exercise affects your need for insulin. · Exercise on most days of the week. Aim for at least 30 minutes. Exercise helps you stay at a healthy weight and helps your body use insulin. Walking is an easy way to get exercise. Gradually increase the amount you walk every day. You also may want to swim, bike, or do other activities. When you eat out  · Learn to estimate the serving sizes of foods that have carbohydrate. If you measure food at home, it will be easier to estimate the amount in a serving of restaurant food. · If the meal you order has too much carbohydrate (such as potatoes, corn, or baked beans), ask to have a low-carbohydrate food instead. Ask for a salad or green vegetables. · If you use insulin, check your blood sugar before and after eating out to help you plan how much to eat in the future. · If you eat more carbohydrate at a meal than you had planned, take a walk or do other exercise. This will help lower your blood sugar. Where can you learn more? Go to [employer]  Enter F470 in the search box to learn more about \"Nutrition Tips for Diabetes: After Your Visit. \"   © 7926-6318 [employer]. Care instructions adapted under license by [employer] (which disclaims liability or warranty for this information). This care instruction is for use with your licensed healthcare professional. If you have questions about a medical condition or this instruction, always ask your healthcare professional. [place] [address] any warranty or liability for your use of this information.   Content Version: 03.1.997001; Current as of: June 4, 2014

## 2017-02-08 LAB
25(OH)D3 SERPL-MCNC: 36 NG/ML (ref 30–100)
CREAT UR-MCNC: 84.18 MG/DL (ref 30–125)
MICROALBUMIN UR-MCNC: 0.5 MG/DL (ref 0–3)
MICROALBUMIN/CREAT UR-RTO: 6 MG/G (ref 0–30)

## 2017-03-17 ENCOUNTER — HOSPITAL ENCOUNTER (OUTPATIENT)
Dept: LAB | Age: 47
Discharge: HOME | End: 2017-03-17
Payer: COMMERCIAL

## 2017-03-17 ENCOUNTER — OFFICE VISIT (OUTPATIENT)
Dept: FAMILY MEDICINE CLINIC | Age: 47
End: 2017-03-17

## 2017-03-17 VITALS
HEIGHT: 62 IN | WEIGHT: 254.8 LBS | RESPIRATION RATE: 16 BRPM | SYSTOLIC BLOOD PRESSURE: 120 MMHG | OXYGEN SATURATION: 97 % | DIASTOLIC BLOOD PRESSURE: 76 MMHG | TEMPERATURE: 98.2 F | BODY MASS INDEX: 46.89 KG/M2 | HEART RATE: 62 BPM

## 2017-03-17 DIAGNOSIS — Z12.4 SCREENING FOR CERVICAL CANCER: ICD-10-CM

## 2017-03-17 DIAGNOSIS — E83.42 HYPOMAGNESEMIA: ICD-10-CM

## 2017-03-17 DIAGNOSIS — Z01.419 WELL WOMAN EXAM WITH ROUTINE GYNECOLOGICAL EXAM: Primary | ICD-10-CM

## 2017-03-17 DIAGNOSIS — E11.9 TYPE 2 DIABETES MELLITUS WITHOUT COMPLICATION, WITHOUT LONG-TERM CURRENT USE OF INSULIN (HCC): ICD-10-CM

## 2017-03-17 DIAGNOSIS — Z12.11 COLON CANCER SCREENING: ICD-10-CM

## 2017-03-17 DIAGNOSIS — R63.5 WEIGHT GAIN: ICD-10-CM

## 2017-03-17 DIAGNOSIS — Z12.39 BREAST CANCER SCREENING: ICD-10-CM

## 2017-03-17 LAB
CHOLEST SERPL-MCNC: 159 MG/DL
HDLC SERPL-MCNC: 68 MG/DL (ref 40–60)
HDLC SERPL: 2.3 {RATIO} (ref 0–5)
LDLC SERPL CALC-MCNC: 75 MG/DL (ref 0–100)
LIPID PROFILE,FLP: ABNORMAL
MAGNESIUM SERPL-MCNC: 2.2 MG/DL (ref 1.8–2.4)
TRIGL SERPL-MCNC: 80 MG/DL (ref ?–150)
VLDLC SERPL CALC-MCNC: 16 MG/DL

## 2017-03-17 PROCEDURE — 83735 ASSAY OF MAGNESIUM: CPT | Performed by: NURSE PRACTITIONER

## 2017-03-17 PROCEDURE — 36415 COLL VENOUS BLD VENIPUNCTURE: CPT | Performed by: NURSE PRACTITIONER

## 2017-03-17 PROCEDURE — 80061 LIPID PANEL: CPT | Performed by: NURSE PRACTITIONER

## 2017-03-17 PROCEDURE — 88142 CYTOPATH C/V THIN LAYER: CPT | Performed by: NURSE PRACTITIONER

## 2017-03-17 NOTE — PATIENT INSTRUCTIONS
Well Visit, Ages 25 to 48: Care Instructions  Your Care Instructions  Physical exams can help you stay healthy. Your doctor has checked your overall health and may have suggested ways to take good care of yourself. He or she also may have recommended tests. At home, you can help prevent illness with healthy eating, regular exercise, and other steps. Follow-up care is a key part of your treatment and safety. Be sure to make and go to all appointments, and call your doctor if you are having problems. It's also a good idea to know your test results and keep a list of the medicines you take. How can you care for yourself at home? · Reach and stay at a healthy weight. This will lower your risk for many problems, such as obesity, diabetes, heart disease, and high blood pressure. · Get at least 30 minutes of physical activity on most days of the week. Walking is a good choice. You also may want to do other activities, such as running, swimming, cycling, or playing tennis or team sports. Discuss any changes in your exercise program with your doctor. · Do not smoke or allow others to smoke around you. If you need help quitting, talk to your doctor about stop-smoking programs and medicines. These can increase your chances of quitting for good. · Talk to your doctor about whether you have any risk factors for sexually transmitted infections (STIs). Having one sex partner (who does not have STIs and does not have sex with anyone else) is a good way to avoid these infections. · Use birth control if you do not want to have children at this time. Talk with your doctor about the choices available and what might be best for you. · Protect your skin from too much sun. When you're outdoors from 10 a.m. to 4 p.m., stay in the shade or cover up with clothing and a hat with a wide brim. Wear sunglasses that block UV rays. Even when it's cloudy, put broad-spectrum sunscreen (SPF 30 or higher) on any exposed skin.   · See a dentist one or two times a year for checkups and to have your teeth cleaned. · Wear a seat belt in the car. · Drink alcohol in moderation, if at all. That means no more than 2 drinks a day for men and 1 drink a day for women. Follow your doctor's advice about when to have certain tests. These tests can spot problems early. For everyone  · Cholesterol. Have the fat (cholesterol) in your blood tested after age 21. Your doctor will tell you how often to have this done based on your age, family history, or other things that can increase your risk for heart disease. · Blood pressure. Have your blood pressure checked during a routine doctor visit. Your doctor will tell you how often to check your blood pressure based on your age, your blood pressure results, and other factors. · Vision. Talk with your doctor about how often to have a glaucoma test.  · Diabetes. Ask your doctor whether you should have tests for diabetes. · Colon cancer. Have a test for colon cancer at age 48. You may have one of several tests. If you are younger than 48, you may need a test earlier if you have any risk factors. Risk factors include whether you already had a precancerous polyp removed from your colon or whether your parent, brother, sister, or child has had colon cancer. For women  · Breast exam and mammogram. Talk to your doctor about when you should have a clinical breast exam and a mammogram. Medical experts differ on whether and how often women under 50 should have these tests. Your doctor can help you decide what is right for you. · Pap test and pelvic exam. Begin Pap tests at age 24. A Pap test is the best way to find cervical cancer. The test often is part of a pelvic exam. Ask how often to have this test.  · Tests for sexually transmitted infections (STIs). Ask whether you should have tests for STIs. You may be at risk if you have sex with more than one person, especially if your partners do not wear condoms.   For men  · Tests for sexually transmitted infections (STIs). Ask whether you should have tests for STIs. You may be at risk if you have sex with more than one person, especially if you do not wear a condom. · Testicular cancer exam. Ask your doctor whether you should check your testicles regularly. · Prostate exam. Talk to your doctor about whether you should have a blood test (called a PSA test) for prostate cancer. Experts differ on whether and when men should have this test. Some experts suggest it if you are older than 39 and are -American or have a father or brother who got prostate cancer when he was younger than 72. When should you call for help? Watch closely for changes in your health, and be sure to contact your doctor if you have any problems or symptoms that concern you. Where can you learn more? Go to http://yane-cooper.info/. Enter P072 in the search box to learn more about \"Well Visit, Ages 25 to 48: Care Instructions. \"  Current as of: July 19, 2016  Content Version: 11.1  © 3467-1066 [employer]. Care instructions adapted under license by [employer] (which disclaims liability or warranty for this information). If you have questions about a medical condition or this instruction, always ask your healthcare professional. [address] any warranty or liability for your use of this information. Body Mass Index: Care Instructions  Your Care Instructions    Body mass index (BMI) can help you see if your weight is raising your risk for health problems. It uses a formula to compare how much you weigh with how tall you are. A BMI between 18.5 and 24.9 is considered healthy. A BMI between 25 and 29.9 is considered overweight. A BMI of 30 or higher is considered obese. If your BMI is in the normal range, it means that you have a lower risk for weight-related health problems.  If your BMI is in the overweight or obese range, you may be at increased risk for weight-related health problems, such as high blood pressure, heart disease, stroke, arthritis or joint pain, and diabetes. BMI is just one measure of your risk for weight-related health problems. You may be at higher risk for health problems if you are not active, you eat an unhealthy diet, or you drink too much alcohol or use tobacco products. Follow-up care is a key part of your treatment and safety. Be sure to make and go to all appointments, and call your doctor if you are having problems. It's also a good idea to know your test results and keep a list of the medicines you take. How can you care for yourself at home? · Practice healthy eating habits. This includes eating plenty of fruits, vegetables, whole grains, lean protein, and low-fat dairy. · Get at least 30 minutes of exercise 5 days a week or more. Brisk walking is a good choice. You also may want to do other activities, such as running, swimming, cycling, or playing tennis or team sports. · Do not smoke. Smoking can increase your risk for health problems. If you need help quitting, talk to your doctor about stop-smoking programs and medicines. These can increase your chances of quitting for good. · Limit alcohol to 2 drinks a day for men and 1 drink a day for women. Too much alcohol can cause health problems. If you have a BMI higher than 25  · Your doctor may do other tests to check your risk for weight-related health problems. This may include measuring the distance around your waist. A waist measurement of more than 40 inches in men or 35 inches in women can increase the risk of weight-related health problems. · Talk with your doctor about steps you can take to stay healthy or improve your health. You may need to make lifestyle changes to lose weight and stay healthy, such as changing your diet and getting regular exercise. Where can you learn more? Go to http://yane-cooper.info/.   Enter S176 in the search box to learn more about \"Body Mass Index: Care Instructions. \"  Current as of: February 16, 2016  Content Version: 11.1  © 7599-3443 [email or web address]. Care instructions adapted under license by [employer] (which disclaims liability or warranty for this information). If you have questions about a medical condition or this instruction, always ask your healthcare professional. [address] any warranty or liability for your use of this information. Nutrition Tips for Diabetes: After Your Visit  Your Care Instructions  A healthy diet is important to manage diabetes. It helps you lose weight (if you need to) and keep it off. It gives you the nutrition and energy your body needs and helps prevent heart disease. But a diet for diabetes does not mean that you have to eat special foods. You can eat what your family eats, including occasional sweets and other favorites. But you do have to pay attention to how often you eat and how much you eat of certain foods. The right plan for you will give you meals that help you keep your blood sugar at healthy levels. Try to eat a variety of foods and to spread carbohydrate throughout the day. Carbohydrate raises blood sugar higher and more quickly than any other nutrient does. Carbohydrate is found in sugar, breads and cereals, fruit, starchy vegetables such as potatoes and corn, and milk and yogurt. You may want to work with a dietitian or diabetes educator to help you plan meals and snacks. A dietitian or diabetes educator also can help you lose weight if that is one of your goals. The following tips can help you enjoy your meals and stay healthy. Follow-up care is a key part of your treatment and safety. Be sure to make and go to all appointments, and call your doctor if you are having problems. Its also a good idea to know your test results and keep a list of the medicines you take.   How can you care for yourself at home?  · Learn which foods have carbohydrate and how much carbohydrate to eat. A dietitian or diabetes educator can help you learn to keep track of how much carbohydrate you eat. · Spread carbohydrate throughout the day. Eat some carbohydrate at all meals, but do not eat too much at any one time. · Plan meals to include food from all the food groups. These are the food groups and some example portion sizes:  ¨ Grains: 1 slice of bread (1 ounce), ½ cup of cooked cereal, and 1/3 cup of cooked pasta or rice. These have about 15 grams of carbohydrate in a serving. Choose whole grains such as whole wheat bread or crackers, oatmeal, and brown rice more often than refined grains. ¨ Fruit: 1 small fresh fruit, such as an apple or orange; ½ of a banana; ½ cup of chopped, cooked, or canned fruit; ½ cup of fruit juice; 1 cup of melon or raspberries; and 2 tablespoons of dried fruit. These have about 15 grams of carbohydrate in a serving. ¨ Dairy: 1 cup of nonfat or low-fat milk and 2/3 cup of plain yogurt. These have about 15 grams of carbohydrate in a serving. ¨ Protein foods: Beef, chicken, turkey, fish, eggs, tofu, cheese, cottage cheese, and peanut butter. A serving size of meat is 3 ounces, which is about the size of a deck of cards. Examples of meat substitute serving sizes (equal to 1 ounce of meat) are 1/4 cup of cottage cheese, 1 egg, 1 tablespoon of peanut butter, and ½ cup of tofu. These have very little or no carbohydrate per serving. ¨ Vegetables: Starchy vegetables such as ½ cup of cooked dried beans, peas, potatoes, or corn have about 15 grams of carbohydrate. Nonstarchy vegetables have very little carbohydrate, such as 1 cup of raw leafy vegetables (such as spinach), ½ cup of other vegetables (cooked or chopped), and 3/4 cup of vegetable juice. · Use the plate format to plan meals.  It is a good, quick way to make sure that you have a balanced meal. It also helps you spread carbohydrate throughout the day. You divide your plate by types of foods. Put vegetables on half the plate, meat or meat substitutes on one-quarter of the plate, and a grain or starchy vegetable (such as brown rice or a potato) in the final quarter of the plate. To this you can add a small piece of fruit and 1 cup of milk or yogurt, depending on how much carbohydrate you are supposed to eat at a meal.  · Talk to your dietitian or diabetes educator about ways to add limited amounts of sweets into your meal plan. You can eat these foods now and then, as long as you include the amount of carbohydrate they have in your daily carbohydrate allowance. · If you drink alcohol, limit it to no more than 1 drink a day for women and 2 drinks a day for men. If you are pregnant, no amount of alcohol is known to be safe. · Protein, fat, and fiber do not raise blood sugar as much as carbohydrate does. If you eat a lot of these nutrients in a meal, your blood sugar will rise more slowly than it would otherwise. · Limit saturated fats, such as those from meat and dairy products. Try to replace it with monounsaturated fat, such as olive oil. This is a healthier choice because people who have diabetes are at higher-than-average risk of heart disease. But use a modest amount of olive oil. A tablespoon of olive oil has 14 grams of fat and 120 calories. · Exercise lowers blood sugar. If you take insulin by shots or pump, you can use less than you would if you were not exercising. Keep in mind that timing matters. If you exercise within 1 hour after a meal, your body may need less insulin for that meal than it would if you exercised 3 hours after the meal. Test your blood sugar to find out how exercise affects your need for insulin. · Exercise on most days of the week. Aim for at least 30 minutes. Exercise helps you stay at a healthy weight and helps your body use insulin. Walking is an easy way to get exercise. Gradually increase the amount you walk every day. You also may want to swim, bike, or do other activities. When you eat out  · Learn to estimate the serving sizes of foods that have carbohydrate. If you measure food at home, it will be easier to estimate the amount in a serving of restaurant food. · If the meal you order has too much carbohydrate (such as potatoes, corn, or baked beans), ask to have a low-carbohydrate food instead. Ask for a salad or green vegetables. · If you use insulin, check your blood sugar before and after eating out to help you plan how much to eat in the future. · If you eat more carbohydrate at a meal than you had planned, take a walk or do other exercise. This will help lower your blood sugar. Where can you learn more? Go to [employer]  Enter P718 in the search box to learn more about \"Nutrition Tips for Diabetes: After Your Visit. \"   © 2769-1040 [employer]. Care instructions adapted under license by [employer] (which disclaims liability or warranty for this information). This care instruction is for use with your licensed healthcare professional. If you have questions about a medical condition or this instruction, always ask your healthcare professional. [address] any warranty or liability for your use of this information.   Content Version: 80.3.610476; Current as of: June 4, 2014

## 2017-03-17 NOTE — MR AVS SNAPSHOT
Visit Information Date & Time Provider Department Dept. Phone Encounter #  
 3/17/2017  9:20 AM Sana Dawson  [facility], operated by [facility] [phone] [id number] Follow-up Instructions Return in about 3 months (around 6/17/2017) for dm type 2. Upcoming Health Maintenance Date Due  
 EYE EXAM RETINAL OR DILATED Q1 4/17/2017* HEMOGLOBIN A1C Q6M 8/7/2017 PAP AKA CERVICAL CYTOLOGY 8/13/2017 LIPID PANEL Q1 10/25/2017 FOOT EXAM Q1 11/2/2017 MICROALBUMIN Q1 2/7/2018 DTaP/Tdap/Td series (2 - Td) 10/26/2026 *Topic was postponed. The date shown is not the original due date. Allergies as of 3/17/2017  Review Complete On: 3/17/2017 By: Sana Dawson NP Severity Noted Reaction Type Reactions Contrave [Naltrexone-bupropion]  03/17/2017    Other (comments) Severe constipation Metformin  09/09/2015   Side Effect Nausea Only Current Immunizations  Reviewed on 2/7/2017 Name Date Influenza Vaccine (Quad) PF 10/25/2016 Not reviewed this visit You Were Diagnosed With   
  
 Codes Comments Well woman exam with routine gynecological exam    -  Primary ICD-10-CM: V32.458 ICD-9-CM: V72.31 [V72.31] Screening for cervical cancer     ICD-10-CM: Z12.4 ICD-9-CM: V76.2 Type 2 diabetes mellitus without complication, without long-term current use of insulin (HCC)     ICD-10-CM: E11.9 ICD-9-CM: 250.00 Weight gain     ICD-10-CM: R63.5 ICD-9-CM: 783.1 Breast cancer screening     ICD-10-CM: Z12.39 
ICD-9-CM: V76.10 Colon cancer screening     ICD-10-CM: Z12.11 ICD-9-CM: V76.51 Hypomagnesemia     ICD-10-CM: R78.85 
ICD-9-CM: 275.2 Vitals BP Pulse Temp Resp Height(growth percentile) Weight(growth percentile) 120/76 (BP 1 Location: Left arm, BP Patient Position: Sitting) 62 98.2 °F (36.8 °C) (Oral) 16 5' 2\" (1.575 m) 254 lb 12.8 oz (115.6 kg) LMP SpO2 BMI OB Status Smoking Status 03/01/2017 (Exact Date) 97% 46.6 kg/m2 Having regular periods Never Smoker BMI and BSA Data Body Mass Index Body Surface Area  
 46.6 kg/m 2 2.25 m 2 Preferred Pharmacy Pharmacy Name Phone [facility] [address] [phone] Your Updated Medication List  
  
   
This list is accurate as of: 3/17/17  9:53 AM.  Always use your most recent med list.  
  
  
  
  
 aspirin delayed-release 81 mg tablet Take 1 Tab by mouth daily. dexlansoprazole 30 mg capsule Commonly known as:  DEXILANT Take 1 Cap by mouth daily as needed. docusate sodium 100 mg capsule Commonly known as:  Hildegarde Berth Take 1 Cap by mouth two (2) times a day. Indications: CONSTIPATION  
  
 magnesium oxide 400 mg tablet Commonly known as:  MAG-OX Take 1 Tab by mouth daily. metoprolol tartrate 25 mg tablet Commonly known as:  LOPRESSOR  
TAKE ONE-HALF TABLET BY MOUTH TWICE DAILY(GENERIC FOR LOPRESSOR)  
  
 multivitamin tablet Commonly known as:  ONE A DAY Take 1 Tab by mouth daily. nitroglycerin 0.4 mg SL tablet Commonly known as:  NITROSTAT Place 1 tablet under tongue at the start of chest pain every 5 minutes up to 3 doses. olmesartan-hydroCHLOROthiazide 20-12.5 mg per tablet Commonly known as:  BENICAR HCT Take 1 Tab by mouth daily. omega-3 acid ethyl esters 1 gram capsule Commonly known as:  Carollynn Raw Take 1 Cap by mouth nightly. pitavastatin 4 mg Tab tablet Commonly known as:  LIVALO Take 1 Tab by mouth nightly. We Performed the Following AMB POC BLOOD OCCULT QUAL FECAL HEMGLBN 1-3 [id number] CPT(R)]  DIABETES EYE EXAM [HM6 Custom] REFERRAL TO OPHTHALMOLOGY [REF57 Custom] Comments:  
 Please evaluate patient for Diabetes. Follow-up Instructions Return in about 3 months (around 6/17/2017) for dm type 2. To-Do List   
 03/17/2017   Lab:  LIPID PANEL   
  
 03/17/2017 Lab:  MAGNESIUM   
  
 03/17/2017 Imaging:  GIANNI MAMMO BI SCREENING INCL CAD   
  
 03/17/2017 Pathology:  PAP, LB, RFX HPV QJPSV([id number]) Referral Information Referral ID Referred By Referred To  
  
 [id number] [facility], Vani Schaffer[address] [place], [facility] [address] Phone: [phone] Fax: [phone] Visits Status Start Date End Date 1 New Request 3/17/17 3/17/18 If your referral has a status of pending review or denied, additional information will be sent to support the outcome of this decision. Patient Instructions Well Visit, Ages 25 to 48: Care Instructions Your Care Instructions Physical exams can help you stay healthy. Your doctor has checked your overall health and may have suggested ways to take good care of yourself. He or she also may have recommended tests. At home, you can help prevent illness with healthy eating, regular exercise, and other steps. Follow-up care is a key part of your treatment and safety. Be sure to make and go to all appointments, and call your doctor if you are having problems. It's also a good idea to know your test results and keep a list of the medicines you take. How can you care for yourself at home? · Reach and stay at a healthy weight. This will lower your risk for many problems, such as obesity, diabetes, heart disease, and high blood pressure. · Get at least 30 minutes of physical activity on most days of the week. Walking is a good choice. You also may want to do other activities, such as running, swimming, cycling, or playing tennis or team sports. Discuss any changes in your exercise program with your doctor. · Do not smoke or allow others to smoke around you. If you need help quitting, talk to your doctor about stop-smoking programs and medicines. These can increase your chances of quitting for good. · Talk to your doctor about whether you have any risk factors for sexually transmitted infections (STIs). Having one sex partner (who does not have STIs and does not have sex with anyone else) is a good way to avoid these infections. · Use birth control if you do not want to have children at this time. Talk with your doctor about the choices available and what might be best for you. · Protect your skin from too much sun. When you're outdoors from 10 a.m. to 4 p.m., stay in the shade or cover up with clothing and a hat with a wide brim. Wear sunglasses that block UV rays. Even when it's cloudy, put broad-spectrum sunscreen (SPF 30 or higher) on any exposed skin. · See a dentist one or two times a year for checkups and to have your teeth cleaned. · Wear a seat belt in the car. · Drink alcohol in moderation, if at all. That means no more than 2 drinks a day for men and 1 drink a day for women. Follow your doctor's advice about when to have certain tests. These tests can spot problems early. For everyone · Cholesterol. Have the fat (cholesterol) in your blood tested after age 21. Your doctor will tell you how often to have this done based on your age, family history, or other things that can increase your risk for heart disease. · Blood pressure. Have your blood pressure checked during a routine doctor visit. Your doctor will tell you how often to check your blood pressure based on your age, your blood pressure results, and other factors. · Vision. Talk with your doctor about how often to have a glaucoma test. 
· Diabetes. Ask your doctor whether you should have tests for diabetes. · Colon cancer. Have a test for colon cancer at age 48. You may have one of several tests. If you are younger than 48, you may need a test earlier if you have any risk factors.  Risk factors include whether you already had a precancerous polyp removed from your colon or whether your parent, brother, sister, or child has had colon cancer. For women · Breast exam and mammogram. Talk to your doctor about when you should have a clinical breast exam and a mammogram. Medical experts differ on whether and how often women under 50 should have these tests. Your doctor can help you decide what is right for you. · Pap test and pelvic exam. Begin Pap tests at age 24. A Pap test is the best way to find cervical cancer. The test often is part of a pelvic exam. Ask how often to have this test. 
· Tests for sexually transmitted infections (STIs). Ask whether you should have tests for STIs. You may be at risk if you have sex with more than one person, especially if your partners do not wear condoms. For men · Tests for sexually transmitted infections (STIs). Ask whether you should have tests for STIs. You may be at risk if you have sex with more than one person, especially if you do not wear a condom. · Testicular cancer exam. Ask your doctor whether you should check your testicles regularly. · Prostate exam. Talk to your doctor about whether you should have a blood test (called a PSA test) for prostate cancer. Experts differ on whether and when men should have this test. Some experts suggest it if you are older than 39 and are -American or have a father or brother who got prostate cancer when he was younger than 72. When should you call for help? Watch closely for changes in your health, and be sure to contact your doctor if you have any problems or symptoms that concern you. Where can you learn more? Go to http://yane-cooper.info/. Enter P072 in the search box to learn more about \"Well Visit, Ages 25 to 48: Care Instructions. \" Current as of: July 19, 2016 Content Version: 11.1 © 0970-0427 [employer].  Care instructions adapted under license by [employer] (which disclaims liability or warranty for this information). If you have questions about a medical condition or this instruction, always ask your healthcare professional. [address] any warranty or liability for your use of this information. Body Mass Index: Care Instructions Your Care Instructions Body mass index (BMI) can help you see if your weight is raising your risk for health problems. It uses a formula to compare how much you weigh with how tall you are. A BMI between 18.5 and 24.9 is considered healthy. A BMI between 25 and 29.9 is considered overweight. A BMI of 30 or higher is considered obese. If your BMI is in the normal range, it means that you have a lower risk for weight-related health problems. If your BMI is in the overweight or obese range, you may be at increased risk for weight-related health problems, such as high blood pressure, heart disease, stroke, arthritis or joint pain, and diabetes. BMI is just one measure of your risk for weight-related health problems. You may be at higher risk for health problems if you are not active, you eat an unhealthy diet, or you drink too much alcohol or use tobacco products. Follow-up care is a key part of your treatment and safety. Be sure to make and go to all appointments, and call your doctor if you are having problems. It's also a good idea to know your test results and keep a list of the medicines you take. How can you care for yourself at home? · Practice healthy eating habits. This includes eating plenty of fruits, vegetables, whole grains, lean protein, and low-fat dairy. · Get at least 30 minutes of exercise 5 days a week or more. Brisk walking is a good choice. You also may want to do other activities, such as running, swimming, cycling, or playing tennis or team sports. · Do not smoke. Smoking can increase your risk for health problems.  If you need help quitting, talk to your doctor about stop-smoking programs and medicines. These can increase your chances of quitting for good. · Limit alcohol to 2 drinks a day for men and 1 drink a day for women. Too much alcohol can cause health problems. If you have a BMI higher than 25 · Your doctor may do other tests to check your risk for weight-related health problems. This may include measuring the distance around your waist. A waist measurement of more than 40 inches in men or 35 inches in women can increase the risk of weight-related health problems. · Talk with your doctor about steps you can take to stay healthy or improve your health. You may need to make lifestyle changes to lose weight and stay healthy, such as changing your diet and getting regular exercise. Where can you learn more? Go to http://yane-cooper.info/. Enter S176 in the search box to learn more about \"Body Mass Index: Care Instructions. \" Current as of: February 16, 2016 Content Version: 11.1 © 6287-4617 [employer]. Care instructions adapted under license by [employer] (which disclaims liability or warranty for this information). If you have questions about a medical condition or this instruction, always ask your healthcare professional. [place] [address] any warranty or liability for your use of this information. Nutrition Tips for Diabetes: After Your Visit Your Care Instructions A healthy diet is important to manage diabetes. It helps you lose weight (if you need to) and keep it off. It gives you the nutrition and energy your body needs and helps prevent heart disease. But a diet for diabetes does not mean that you have to eat special foods. You can eat what your family eats, including occasional sweets and other favorites. But you do have to pay attention to how often you eat and how much you eat of certain foods. The right plan for you will give you meals that help you keep your blood sugar at healthy levels. Try to eat a variety of foods and to spread carbohydrate throughout the day. Carbohydrate raises blood sugar higher and more quickly than any other nutrient does. Carbohydrate is found in sugar, breads and cereals, fruit, starchy vegetables such as potatoes and corn, and milk and yogurt. You may want to work with a dietitian or diabetes educator to help you plan meals and snacks. A dietitian or diabetes educator also can help you lose weight if that is one of your goals. The following tips can help you enjoy your meals and stay healthy. Follow-up care is a key part of your treatment and safety. Be sure to make and go to all appointments, and call your doctor if you are having problems. Its also a good idea to know your test results and keep a list of the medicines you take. How can you care for yourself at home? · Learn which foods have carbohydrate and how much carbohydrate to eat. A dietitian or diabetes educator can help you learn to keep track of how much carbohydrate you eat. · Spread carbohydrate throughout the day. Eat some carbohydrate at all meals, but do not eat too much at any one time. · Plan meals to include food from all the food groups. These are the food groups and some example portion sizes: ¨ Grains: 1 slice of bread (1 ounce), ½ cup of cooked cereal, and 1/3 cup of cooked pasta or rice. These have about 15 grams of carbohydrate in a serving. Choose whole grains such as whole wheat bread or crackers, oatmeal, and brown rice more often than refined grains. ¨ Fruit: 1 small fresh fruit, such as an apple or orange; ½ of a banana; ½ cup of chopped, cooked, or canned fruit; ½ cup of fruit juice; 1 cup of melon or raspberries; and 2 tablespoons of dried fruit. These have about 15 grams of carbohydrate in a serving. ¨ Dairy: 1 cup of nonfat or low-fat milk and 2/3 cup of plain yogurt. These have about 15 grams of carbohydrate in a serving. ¨ Protein foods: Beef, chicken, turkey, fish, eggs, tofu, cheese, cottage cheese, and peanut butter. A serving size of meat is 3 ounces, which is about the size of a deck of cards. Examples of meat substitute serving sizes (equal to 1 ounce of meat) are 1/4 cup of cottage cheese, 1 egg, 1 tablespoon of peanut butter, and ½ cup of tofu. These have very little or no carbohydrate per serving. ¨ Vegetables: Starchy vegetables such as ½ cup of cooked dried beans, peas, potatoes, or corn have about 15 grams of carbohydrate. Nonstarchy vegetables have very little carbohydrate, such as 1 cup of raw leafy vegetables (such as spinach), ½ cup of other vegetables (cooked or chopped), and 3/4 cup of vegetable juice. · Use the plate format to plan meals. It is a good, quick way to make sure that you have a balanced meal. It also helps you spread carbohydrate throughout the day. You divide your plate by types of foods. Put vegetables on half the plate, meat or meat substitutes on one-quarter of the plate, and a grain or starchy vegetable (such as brown rice or a potato) in the final quarter of the plate. To this you can add a small piece of fruit and 1 cup of milk or yogurt, depending on how much carbohydrate you are supposed to eat at a meal. 
· Talk to your dietitian or diabetes educator about ways to add limited amounts of sweets into your meal plan. You can eat these foods now and then, as long as you include the amount of carbohydrate they have in your daily carbohydrate allowance. · If you drink alcohol, limit it to no more than 1 drink a day for women and 2 drinks a day for men. If you are pregnant, no amount of alcohol is known to be safe. · Protein, fat, and fiber do not raise blood sugar as much as carbohydrate does. If you eat a lot of these nutrients in a meal, your blood sugar will rise more slowly than it would otherwise. · Limit saturated fats, such as those from meat and dairy products.  Try to replace it with monounsaturated fat, such as olive oil. This is a healthier choice because people who have diabetes are at higher-than-average risk of heart disease. But use a modest amount of olive oil. A tablespoon of olive oil has 14 grams of fat and 120 calories. · Exercise lowers blood sugar. If you take insulin by shots or pump, you can use less than you would if you were not exercising. Keep in mind that timing matters. If you exercise within 1 hour after a meal, your body may need less insulin for that meal than it would if you exercised 3 hours after the meal. Test your blood sugar to find out how exercise affects your need for insulin. · Exercise on most days of the week. Aim for at least 30 minutes. Exercise helps you stay at a healthy weight and helps your body use insulin. Walking is an easy way to get exercise. Gradually increase the amount you walk every day. You also may want to swim, bike, or do other activities. When you eat out · Learn to estimate the serving sizes of foods that have carbohydrate. If you measure food at home, it will be easier to estimate the amount in a serving of restaurant food. · If the meal you order has too much carbohydrate (such as potatoes, corn, or baked beans), ask to have a low-carbohydrate food instead. Ask for a salad or green vegetables. · If you use insulin, check your blood sugar before and after eating out to help you plan how much to eat in the future. · If you eat more carbohydrate at a meal than you had planned, take a walk or do other exercise. This will help lower your blood sugar. Where can you learn more? Go to Socrative.be Enter A525 in the search box to learn more about \"Nutrition Tips for Diabetes: After Your Visit. \"  
© 6276-1700 [employer].  Care instructions adapted under license by Meghan Bettencourt (which disclaims liability or warranty for this information). This care instruction is for use with your licensed healthcare professional. If you have questions about a medical condition or this instruction, always ask your healthcare professional. [address] any warranty or liability for your use of this information. Content Version: 16.1.130130; Current as of: June 4, 2014 Introducing [facility]! Dear Manuelito Brady: Thank you for requesting a Klood account. Our records indicate that you already have an active Klood account. You can access your account anytime at [email or web address] Did you know that you can access your hospital and ER discharge instructions at any time in Klood? You can also review all of your test results from your hospital stay or ER visit. Additional Information If you have questions, please visit the Frequently Asked Questions section of the Klood website at [email or web address] Remember, Klood is NOT to be used for urgent needs. For medical emergencies, dial 911. Now available from your iPhone and Android! Please provide this summary of care documentation to your next provider. Your primary care clinician is listed as [address]. If you have any questions after today's visit, please call [phone].

## 2017-03-17 NOTE — PROGRESS NOTES
1. Have you been to the ER, urgent care clinic since your last visit? Hospitalized since your last visit? No    2. Have you seen or consulted any other health care providers outside of the [address] [place] [address] since your last visit? Include any pap smears or colon screening. NO    3. Would you like to have a Flu Shot Today? Already had    4. Vaccines? NO    Patient's last PAP was Aug 2014 at [facility]    Rx updated. Subjective:   49 y.o. female for Well Woman Check. Patient's last menstrual period was 03/01/2017 (exact date). Social History: single partner, contraception - none. Patient Active Problem List   Diagnosis Code    CAD (coronary artery disease), native coronary artery I25.10    S/P CABG x 1 Z95.1    Obesity E66.9    Dyslipidemia E78.5    BMI 45.0-49.9, adult ([facility] Z68.42    Hypomagnesemia E83.42    Diabetes mellitus ([facility] E11.9    Gastroesophageal reflux disease without esophagitis K21.9    Hypertension, goal below 140/80 I10    ACS (acute coronary syndrome) ([facility]) I24.9    Chest pain R07.9     Patient Active Problem List    Diagnosis Date Noted    Chest pain 11/23/2016    ACS (acute coronary syndrome) ([facility] 11/22/2016    Hypertension, goal below 140/80 09/26/2016    Gastroesophageal reflux disease without esophagitis 12/07/2015    Diabetes mellitus ([facility] 09/24/2014    Hypomagnesemia 08/18/2014    BMI 45.0-49.9, adult ([facility] 05/14/2014    Obesity 01/31/2012    Dyslipidemia 01/31/2012    CAD (coronary artery disease), native coronary artery 01/23/2012    S/P CABG x 1 01/23/2012     Current Outpatient Prescriptions   Medication Sig Dispense Refill    olmesartan-hydroCHLOROthiazide (BENICAR HCT) 20-12.5 mg per tablet Take 1 Tab by mouth daily. 90 Tab 3    dexlansoprazole (DEXILANT) 30 mg capsule Take 1 Cap by mouth daily as needed. 90 Cap 1    pitavastatin (LIVALO) 4 mg tab tablet Take 1 Tab by mouth nightly.  90 Tab 1    metoprolol tartrate (LOPRESSOR) 25 mg tablet TAKE ONE-HALF TABLET BY MOUTH TWICE DAILY(GENERIC FOR LOPRESSOR) 30 Tab 3    magnesium oxide (MAG-OX) 400 mg tablet Take 1 Tab by mouth daily. 30 Tab 11    docusate sodium (COLACE) 100 mg capsule Take 1 Cap by mouth two (2) times a day. Indications: CONSTIPATION 60 Cap 11    nitroglycerin (NITROSTAT) 0.4 mg SL tablet Place 1 tablet under tongue at the start of chest pain every 5 minutes up to 3 doses. 1 Bottle 3    aspirin delayed-release 81 mg tablet Take 1 Tab by mouth daily. 30 Tab 11    multivitamin (ONE A DAY) tablet Take 1 Tab by mouth daily. 30 Tab 11    omega-3 acid ethyl esters (LOVAZA) 1 gram capsule Take 1 Cap by mouth nightly. 90 Cap 3     Allergies   Allergen Reactions    Contrave [Naltrexone-Bupropion] Other (comments)     Severe constipation    Metformin Nausea Only     Past Medical History:   Diagnosis Date    Abnormal PFT 10/1/2015    Dr. Yanna Schroeder, Pulmonology    Acid reflux     CABG x 1 12/31/2011    LIMA-LAD    CAD (coronary artery disease)     Cardiac cath 11/23/2016    R dom. oLAD 85%. Otherwise patent coronaries. HECTOR to LAD patent. LVEDP 22 mmHg. EF 55%. No RWMA.  Cardiac echocardiogram 07/28/2015    Tech difficult. EF 50-55%. No WMA. Mild conc LVH. Gr 2 DDfx. RVSP 45-50 mmHg. Mild LAE. Mild ROSITA. Mod TR. Mild IVCE.  Cardiac nuclear imaging test, high risk 11/23/2016    High risk. Mainly fixed anteroapical defect w/partial reversibility. EF 60%. No RWMA. Nondiagnostic EKG on pharm stress test.    Cardiovascular upper extremity arterial duplex 01/03/2014    No significant occlusive arterial disease bilaterally.     Coronary atherosclerosis of native coronary artery     Diabetes mellitus ([facility] 9/22/14    hgbA1C 6.8    Dyslipidemia     GERD (gastroesophageal reflux disease)     H/O screening mammography 12/06/2016    no evidence of malignancy    Hypercholesterolemia     Hypomagnesemia 8/14/14    1.4    Pleural effusion, left     s/p CABG and thoracentesis with removal of 900 mL    Pulmonary arterial hypertension ([address]      Past Surgical History:   Procedure Laterality Date    CARDIAC CATHETERIZATION  2016         CARDIAC SURG PROCEDURE UNLIST      CORONARY ARTERY ANGIOGRAM  2016         HX  SECTION      HX CORONARY ARTERY BYPASS GRAFT  12/31/2011    x1    LV ANGIOGRAPHY  2016          Family History   Problem Relation Age of Onset    Diabetes Mother     Hypertension Father     Alzheimer Father 61     Social History   Substance Use Topics    Smoking status: Never Smoker    Smokeless tobacco: Never Used      Comment: 2nd home smoking from mother during 1st 17 yrs of life    Alcohol use No        ROS:  Feeling well. No dyspnea or chest pain on exertion. No abdominal pain, change in bowel habits, black or bloody stools. No urinary tract symptoms. GYN ROS: normal menses, no abnormal bleeding, pelvic pain or discharge, no breast pain or new or enlarging lumps on self exam, she complains of nothing today. No neurological complaints. Objective:     Visit Vitals    /76 (BP 1 Location: Left arm, BP Patient Position: Sitting)    Pulse 62    Temp 98.2 °F (36.8 °C) (Oral)    Resp 16    Ht 5' 2\" (1.575 m)    Wt 254 lb 12.8 oz (115.6 kg)    LMP 2017 (Exact Date)    SpO2 97%    BMI 46.6 kg/m2     The patient appears well, alert, oriented x 3, in no distress. ENT normal.  Neck supple. No adenopathy or thyromegaly. TOMASZ. Lungs are clear, good air entry, no wheezes, rhonchi or rales. S1 and S2 normal, no murmurs, regular rate and rhythm. Abdomen soft without tenderness, guarding, mass or organomegaly. Extremities show no edema, normal peripheral pulses. Neurological is normal, no focal findings.     BREAST EXAM: breasts appear normal, no suspicious masses, no skin or nipple changes or axillary nodes, risk and benefit of breast self-exam was discussed    PELVIC EXAM: normal external genitalia, vulva, vagina, cervix, uterus and adnexa, PAP: Pap smear done today, HPV test  Rectal exam: negative without mass, lesions or tenderness, sphincter tone normal, stool guaiac negative. Neno Abreu was seen today for well woman. Diagnoses and all orders for this visit:    Well woman exam with routine gynecological exam  Comments:  [V72.31]    Screening for cervical cancer  -     PAP, LB, RFX HPV TOBOB([id number]); Future    Type 2 diabetes mellitus without complication, without long-term current use of insulin (HCC)  -      DIABETES EYE EXAM  -     REFERRAL TO OPHTHALMOLOGY  -     LIPID PANEL; Future    Weight gain  -     LIPID PANEL; Future    Breast cancer screening  -     GIANNI MAMMO BI SCREENING INCL CAD; Future    Colon cancer screening  -     AMB POC BLOOD OCCULT QUAL FECAL HEMGLBN 1-3    Hypomagnesemia  -     MAGNESIUM; Future    Other orders  -     Cancel: CBC; Future  -     Cancel: COMP METABOLIC PANEL; Future  -     Cancel: LIPID PANEL; Future    Anticipatory guidance regarding health promotion for this age range and patient verbalizes understanding. Health maintenance reviewed  Portion control and exercise healthy eating general guidelines reviewed with patient to get closer to normal BMI  Patient verbalizes understanding. I have discussed the diagnosis with the patient and the intended plan as seen in the above orders. The patient has received an after-visit summary and questions were answered concerning future plans. I have discussed medication side effects and warnings with the patient as well. Follow-up Disposition:  Return in about 3 months (around 6/17/2017) for dm type 2.

## 2017-04-25 ENCOUNTER — HOSPITAL ENCOUNTER (OUTPATIENT)
Dept: MAMMOGRAPHY | Age: 47
Discharge: HOME | End: 2017-04-25
Attending: NURSE PRACTITIONER
Payer: COMMERCIAL

## 2017-04-25 DIAGNOSIS — Z12.39 BREAST CANCER SCREENING: ICD-10-CM

## 2017-04-25 PROCEDURE — 77067 SCR MAMMO BI INCL CAD: CPT

## 2017-05-04 ENCOUNTER — TELEPHONE (OUTPATIENT)
Dept: FAMILY MEDICINE CLINIC | Age: 47
End: 2017-05-04

## 2017-05-04 NOTE — TELEPHONE ENCOUNTER
Pt called and said that dexilant is not covered by her insurance anymore and requesting another rx. I told the pt to call insurance and ask what medication will be approved to substitute rx.

## 2017-05-08 DIAGNOSIS — I10 HYPERTENSION, GOAL BELOW 140/80: ICD-10-CM

## 2017-05-08 RX ORDER — METOPROLOL TARTRATE 25 MG/1
TABLET, FILM COATED ORAL
Qty: 30 TAB | Refills: 3 | Status: SHIPPED | OUTPATIENT
Start: 2017-05-08 | End: 2017-09-10 | Stop reason: SDUPTHER

## 2017-06-29 ENCOUNTER — OFFICE VISIT (OUTPATIENT)
Dept: FAMILY MEDICINE CLINIC | Age: 47
End: 2017-06-29

## 2017-06-29 VITALS
HEART RATE: 62 BPM | WEIGHT: 262 LBS | HEIGHT: 62 IN | DIASTOLIC BLOOD PRESSURE: 82 MMHG | BODY MASS INDEX: 48.21 KG/M2 | TEMPERATURE: 98.4 F | OXYGEN SATURATION: 97 % | SYSTOLIC BLOOD PRESSURE: 128 MMHG | RESPIRATION RATE: 16 BRPM

## 2017-06-29 DIAGNOSIS — R05.9 COUGH: Primary | ICD-10-CM

## 2017-06-29 DIAGNOSIS — I27.21 PULMONARY ARTERIAL HYPERTENSION (HCC): ICD-10-CM

## 2017-06-29 DIAGNOSIS — F41.9 ANXIOUS MOOD: ICD-10-CM

## 2017-06-29 NOTE — PATIENT INSTRUCTIONS
Loratadine (By mouth)   Loratadine ([id number])  Treats allergy symptoms and hives. Brand Name(s): Alavert, Allergy, Brite-Life Allergy Relief, Children's Claritin, Children's Claritin Allergy, Children's Loratadine, Children's Loratadine Allergy, Claritin, Claritin 24HR, Claritin Liqui-Gels, Claritin RediTabs, [facility]ighbor Loratadine, [facility] Children's Loratadine, [facility] Loratadine, Good Sense Allergy Relief   There may be other brand names for this medicine. When This Medicine Should Not Be Used: You should not use this medicine if you have had an allergic reaction to loratadine. Do not give any over-the-counter (OTC) cough and cold medicine to a baby or child under 3years old. Using these medicines in very young children might cause serious or possibly life-threatening side effects. How to Use This Medicine:   Tablet, Dissolving Tablet, Liquid, Liquid Filled Capsule, Chewable Tablet  · Your doctor will tell you how much of this medicine to use and how often. Do not use more medicine or use it more often than your doctor tells you to. · Follow the instructions on the medicine label if you are using this medicine without a prescription. · After you remove a rapidly disintegrating tablet (Reditab®) from the package, use it right away by putting the tablet on your tongue. The tablet will break up and dissolve quickly. You may take the tablet with or without water. · Measure the oral liquid medicine with a marked measuring spoon, oral syringe, or medicine cup. If a dose is missed:   · If you miss a dose or forget to take your medicine, take it as soon as you can. If it is almost time for your next dose, wait until then to take the medicine and skip the missed dose. · Do not use extra medicine to make up for a missed dose. How to Store and Dispose of This Medicine:   · Store the medicine at room temperature, away from heat and direct light. Do not freeze.   · Ask your pharmacist, doctor, or health caregiver about the best way to dispose of any outdated medicine or medicine no longer needed. · Keep all medicine out of the reach of children and never share your medicine with anyone. Drugs and Foods to Avoid:      Ask your doctor or pharmacist before using any other medicine, including over-the-counter medicines, vitamins, and herbal products. Warnings While Using This Medicine:   · Make sure your doctor knows if you are pregnant or breastfeeding, or if you have liver disease or kidney disease. Possible Side Effects While Using This Medicine:   Call your doctor right away if you notice any of these side effects:  · Allergic reaction: Itching or hives, swelling in face or hands, swelling or tingling in the mouth or throat, tightness in chest, trouble breathing  · Extreme weakness  · Fast or irregular heartbeat  · Uncontrolled movements of the head, neck, eyes or tongue  If you notice these less serious side effects, talk with your doctor:   · Cough, sore throat, hoarseness  · Drowsiness  · Dry mouth  · Headache  · Nervousness  · Red, irritated eyes  If you notice other side effects that you think are caused by this medicine, tell your doctor. Call your doctor for medical advice about side effects. You may report side effects to FDA at 1-667-FDA-0625  © 2017 [facility] Information is for End User's use only and may not be sold, redistributed or otherwise used for commercial purposes. The above information is an  only. It is not intended as medical advice for individual conditions or treatments. Talk to your doctor, nurse or pharmacist before following any medical regimen to see if it is safe and effective for you.

## 2017-06-29 NOTE — PROGRESS NOTES
HISTORY OF PRESENT ILLNESS  Josephine Gabriel is a 49 y.o. female. HPI  Patient is here today for evaluation and treatment of: Cough    Cough:Pt states she started having a cough; cough is nonproductive,  No fever; not helped by anything. NO postnasal drip; per chart review , pt has a h/o pulmonary HTN; she does have to clear her throat often. Pt states she has had marleen e pain in her left chest associated with nervousness; Has been more frequent; sx began 2 weeks ago. States she will talk and suddenly will get SOB. Pain is achy. No lightheadedness; pain is rated a 4-8 10 intensity;  Pain is helped by \" calming herself \" down. Pt states she does gets stressed at times. She may feel like she gets anxious. She had a negative stress test in November. She has had single artery bypass sometime ago. She is followed by cardiology. PMH,  Meds, Allergies, Family History, Social history reviewed0         Current Outpatient Prescriptions:     metoprolol tartrate (LOPRESSOR) 25 mg tablet, TAKE ONE-HALF TABLET BY MOUTH TWICE DAILY(GENERIC FOR LOPRESSOR), Disp: 30 Tab, Rfl: 3    olmesartan-hydroCHLOROthiazide (BENICAR HCT) 20-12.5 mg per tablet, Take 1 Tab by mouth daily. , Disp: 90 Tab, Rfl: 3    dexlansoprazole (DEXILANT) 30 mg capsule, Take 1 Cap by mouth daily as needed. , Disp: 90 Cap, Rfl: 1    pitavastatin (LIVALO) 4 mg tab tablet, Take 1 Tab by mouth nightly., Disp: 90 Tab, Rfl: 1    magnesium oxide (MAG-OX) 400 mg tablet, Take 1 Tab by mouth daily. , Disp: 30 Tab, Rfl: 11    nitroglycerin (NITROSTAT) 0.4 mg SL tablet, Place 1 tablet under tongue at the start of chest pain every 5 minutes up to 3 doses. , Disp: 1 Bottle, Rfl: 3    aspirin delayed-release 81 mg tablet, Take 1 Tab by mouth daily. , Disp: 30 Tab, Rfl: 11    multivitamin (ONE A DAY) tablet, Take 1 Tab by mouth daily. , Disp: 30 Tab, Rfl: 11    omega-3 acid ethyl esters (LOVAZA) 1 gram capsule, Take 1 Cap by mouth nightly., Disp: 90 Cap, Rfl: 3    Review of Systems   Constitutional: Negative for chills and fever. Respiratory: Negative for shortness of breath and wheezing. Cardiovascular: Negative for leg swelling. Physical Exam   Constitutional: She appears well-developed and well-nourished. No distress. HENT:   Right Ear: Tympanic membrane normal.   Left Ear: Tympanic membrane normal.   Nose: No mucosal edema or rhinorrhea. Mouth/Throat: No oropharyngeal exudate or posterior oropharyngeal edema. Cardiovascular: Normal rate and regular rhythm. Exam reveals no gallop and no friction rub. No murmur heard. Pulmonary/Chest: Breath sounds normal. No respiratory distress. She has no wheezes. She has no rales. Musculoskeletal: She exhibits no edema. Nursing note and vitals reviewed. ASSESSMENT and PLAN    ICD-10-CM ICD-9-CM    1. Cough R05 786.2 REFERRAL TO PULMONARY DISEASE   2. Anxious mood F41.9 300.00    3. Pulmonary arterial hypertension (HCC) I27.2 416.8 REFERRAL TO PULMONARY DISEASE       As above, new  Monitor anxiety; if worsens, will consider med for same;  Given cough and no continued eval for pulmonary HTN , will refer to pulmonary  Also consider claritin OTC for probable allergic rhinitis with post nasal drip;  TO ED for worsening CP, SOB, presyncope, syncope  Follow-up Disposition:  Return in about 3 months (around 9/29/2017) for cough/mood. An After Visit Summary was printed and given to the patient. This has been fully explained to the patient, who indicates understanding.

## 2017-06-29 NOTE — MR AVS SNAPSHOT
Visit Information Date & Time Provider Department Dept. Phone Encounter #  
 6/29/2017  5:00 PM Jam Mesa, Elinor0 [facility] [phone] [id number] Follow-up Instructions Return in about 3 months (around 9/29/2017) for cough/mood. Upcoming Health Maintenance Date Due  
 EYE EXAM RETINAL OR DILATED Q1 1/21/1979 INFLUENZA AGE 9 TO ADULT 8/1/2017 HEMOGLOBIN A1C Q6M 8/7/2017 FOOT EXAM Q1 11/2/2017 MICROALBUMIN Q1 2/7/2018 LIPID PANEL Q1 3/17/2018 PAP AKA CERVICAL CYTOLOGY 3/17/2020 DTaP/Tdap/Td series (2 - Td) 10/26/2026 Allergies as of 6/29/2017  Review Complete On: 6/29/2017 By: Jenae Pelaez LPN Severity Noted Reaction Type Reactions Contrave [Naltrexone-bupropion]  03/17/2017    Other (comments) Severe constipation Metformin  09/09/2015   Side Effect Nausea Only Current Immunizations  Reviewed on 2/7/2017 Name Date Influenza Vaccine (Quad) PF 10/25/2016 Not reviewed this visit You Were Diagnosed With   
  
 Codes Comments Cough    -  Primary ICD-10-CM: D42 ICD-9-CM: 786.2 Anxious mood     ICD-10-CM: F41.9 ICD-9-CM: 300.00 Pulmonary arterial hypertension (HCC)     ICD-10-CM: I27.2 ICD-9-CM: 416.8 Vitals BP Pulse Temp Resp Height(growth percentile) Weight(growth percentile) 128/82 62 98.4 °F (36.9 °C) (Oral) 16 5' 2\" (1.575 m) 262 lb (118.8 kg) LMP SpO2 BMI OB Status Smoking Status 06/16/2017 97% 47.92 kg/m2 Having regular periods Never Smoker Vitals History BMI and BSA Data Body Mass Index Body Surface Area  
 47.92 kg/m 2 2.28 m 2 Preferred Pharmacy Pharmacy Name Phone [facility] [address] [phone] Your Updated Medication List  
  
   
This list is accurate as of: 6/29/17  5:43 PM.  Always use your most recent med list.  
  
  
  
  
 aspirin delayed-release 81 mg tablet Take 1 Tab by mouth daily. dexlansoprazole 30 mg capsule Commonly known as:  DEXILANT Take 1 Cap by mouth daily as needed. magnesium oxide 400 mg tablet Commonly known as:  MAG-OX Take 1 Tab by mouth daily. metoprolol tartrate 25 mg tablet Commonly known as:  LOPRESSOR  
TAKE ONE-HALF TABLET BY MOUTH TWICE DAILY(GENERIC FOR LOPRESSOR)  
  
 multivitamin tablet Commonly known as:  ONE A DAY Take 1 Tab by mouth daily. nitroglycerin 0.4 mg SL tablet Commonly known as:  NITROSTAT Place 1 tablet under tongue at the start of chest pain every 5 minutes up to 3 doses. olmesartan-hydroCHLOROthiazide 20-12.5 mg per tablet Commonly known as:  BENICAR HCT Take 1 Tab by mouth daily. omega-3 acid ethyl esters 1 gram capsule Commonly known as:  Shonda Peter Take 1 Cap by mouth nightly. pitavastatin 4 mg Tab tablet Commonly known as:  LIVALO Take 1 Tab by mouth nightly. We Performed the Following REFERRAL TO PULMONARY DISEASE [[facility]] Comments:  
 Please evaluate for WELLS, pulmonary htn; h/o abnormal PFTs Follow-up Instructions Return in about 3 months (around 9/29/2017) for cough/mood. Referral Information Referral ID Referred By Referred To  
  
 [id number] Oli Navarro MD   
   [address] [place] [address], [address] [place] [address],[facility] Phone: [phone] Fax: [phone] Visits Status Start Date End Date 1 New Request 6/29/17 6/29/18 If your referral has a status of pending review or denied, additional information will be sent to support the outcome of this decision. Patient Instructions Loratadine (By mouth) Loratadine ([id number]-[id number]-hakeem) Treats allergy symptoms and hives.   
Brand Name(s): Alavert, Allergy, Brite-Life Allergy Relief, Children's Claritin, Children's Claritin Allergy, Children's Loratadine, Children's Loratadine Allergy, Claritin, Claritin 24HR, Claritin Liqui-Gels, Claritin RediTabs, [facility] Loratadine, [facility] Children's Loratadine, [facility] Loratadine, Good Sense Allergy Relief There may be other brand names for this medicine. When This Medicine Should Not Be Used: You should not use this medicine if you have had an allergic reaction to loratadine. Do not give any over-the-counter (OTC) cough and cold medicine to a baby or child under 3years old. Using these medicines in very young children might cause serious or possibly life-threatening side effects. How to Use This Medicine:  
Tablet, Dissolving Tablet, Liquid, Liquid Filled Capsule, Chewable Tablet · Your doctor will tell you how much of this medicine to use and how often. Do not use more medicine or use it more often than your doctor tells you to. · Follow the instructions on the medicine label if you are using this medicine without a prescription. · After you remove a rapidly disintegrating tablet (Reditab®) from the package, use it right away by putting the tablet on your tongue. The tablet will break up and dissolve quickly. You may take the tablet with or without water. · Measure the oral liquid medicine with a marked measuring spoon, oral syringe, or medicine cup. If a dose is missed: · If you miss a dose or forget to take your medicine, take it as soon as you can. If it is almost time for your next dose, wait until then to take the medicine and skip the missed dose. · Do not use extra medicine to make up for a missed dose. How to Store and Dispose of This Medicine: · Store the medicine at room temperature, away from heat and direct light. Do not freeze. · Ask your pharmacist, doctor, or health caregiver about the best way to dispose of any outdated medicine or medicine no longer needed. · Keep all medicine out of the reach of children and never share your medicine with anyone. Drugs and Foods to Avoid: Ask your doctor or pharmacist before using any other medicine, including over-the-counter medicines, vitamins, and herbal products. Warnings While Using This Medicine: · Make sure your doctor knows if you are pregnant or breastfeeding, or if you have liver disease or kidney disease. Possible Side Effects While Using This Medicine:  
Call your doctor right away if you notice any of these side effects: · Allergic reaction: Itching or hives, swelling in face or hands, swelling or tingling in the mouth or throat, tightness in chest, trouble breathing · Extreme weakness · Fast or irregular heartbeat · Uncontrolled movements of the head, neck, eyes or tongue If you notice these less serious side effects, talk with your doctor: · Cough, sore throat, hoarseness · Drowsiness · Dry mouth 
· Headache · Nervousness · Red, irritated eyes If you notice other side effects that you think are caused by this medicine, tell your doctor. Call your doctor for medical advice about side effects. You may report side effects to FDA at 2-220-DHF-8643 © 2017 [facility] Information is for End User's use only and may not be sold, redistributed or otherwise used for commercial purposes. The above information is an  only. It is not intended as medical advice for individual conditions or treatments. Talk to your doctor, nurse or pharmacist before following any medical regimen to see if it is safe and effective for you. Introducing [facility]! Dear Jessica Bautista: Thank you for requesting a [employer] account. Our records indicate that you already have an active [employer] account. You can access your account anytime at [email or web address] Did you know that you can access your hospital and ER discharge instructions at any time in [employer]? You can also review all of your test results from your hospital stay or ER visit. Additional Information If you have questions, please visit the Frequently Asked Questions section of the [employer] website at [email or web address] Remember, [employer] is NOT to be used for urgent needs. For medical emergencies, dial 911. Now available from your iPhone and Android! Please provide this summary of care documentation to your next provider. Your primary care clinician is listed as Manny Hernandez. If you have any questions after today's visit, please call [phone].

## 2017-06-29 NOTE — PROGRESS NOTES
1. Have you been to the ER, urgent care clinic since your last visit? Hospitalized since your last visit? No    2. Have you seen or consulted any other health care providers outside of the [address] [place] [address] since your last visit? Include any pap smears or colon screening.  No

## 2017-09-10 DIAGNOSIS — I10 HYPERTENSION, GOAL BELOW 140/80: ICD-10-CM

## 2017-09-11 RX ORDER — METOPROLOL TARTRATE 25 MG/1
TABLET, FILM COATED ORAL
Qty: 30 TAB | Refills: 3 | Status: SHIPPED | OUTPATIENT
Start: 2017-09-11 | End: 2018-01-12 | Stop reason: SDUPTHER

## 2017-12-16 DIAGNOSIS — E83.42 HYPOMAGNESEMIA: ICD-10-CM

## 2017-12-16 DIAGNOSIS — K21.9 GASTROESOPHAGEAL REFLUX DISEASE WITHOUT ESOPHAGITIS: ICD-10-CM

## 2017-12-16 DIAGNOSIS — G47.62 NOCTURNAL LEG CRAMPS: ICD-10-CM

## 2017-12-16 RX ORDER — DEXLANSOPRAZOLE 30 MG/1
CAPSULE, DELAYED RELEASE ORAL
Qty: 90 CAP | Refills: 1 | Status: SHIPPED | OUTPATIENT
Start: 2017-12-16 | End: 2018-07-13 | Stop reason: SDUPTHER

## 2017-12-18 RX ORDER — LANOLIN ALCOHOL/MO/W.PET/CERES
CREAM (GRAM) TOPICAL
Qty: 30 TAB | Refills: 11 | Status: SHIPPED | OUTPATIENT
Start: 2017-12-18 | End: 2019-02-21 | Stop reason: SDUPTHER

## 2018-01-12 DIAGNOSIS — I10 HYPERTENSION, GOAL BELOW 140/80: ICD-10-CM

## 2018-01-12 RX ORDER — METOPROLOL TARTRATE 25 MG/1
TABLET, FILM COATED ORAL
Qty: 30 TAB | Refills: 3 | Status: SHIPPED | OUTPATIENT
Start: 2018-01-12 | End: 2018-09-19 | Stop reason: SDUPTHER

## 2018-01-12 RX ORDER — METOPROLOL TARTRATE 25 MG/1
TABLET, FILM COATED ORAL
Qty: 30 TAB | Refills: 3 | Status: SHIPPED | OUTPATIENT
Start: 2018-01-12 | End: 2018-01-12 | Stop reason: DRUGHIGH

## 2018-01-12 NOTE — TELEPHONE ENCOUNTER
From: Najma Gaona  To: Cary Garza NP  Sent: 1/12/2018 8:21 AM EST  Subject: Medication Renewal Request    Original authorizing provider: ERICK Segovia would like a refill of the following medications:  metoprolol tartrate (LOPRESSOR) 25 mg tablet Violeta Wells NP]    Preferred pharmacy: [address] [place] [address], [address] [place] [address] [facility],  [address].     Comment:  Mag oxide 400mg need a refill

## 2018-01-30 ENCOUNTER — OFFICE VISIT (OUTPATIENT)
Dept: FAMILY MEDICINE CLINIC | Age: 48
End: 2018-01-30

## 2018-01-30 VITALS
BODY MASS INDEX: 48.21 KG/M2 | DIASTOLIC BLOOD PRESSURE: 73 MMHG | WEIGHT: 262 LBS | TEMPERATURE: 98.2 F | RESPIRATION RATE: 16 BRPM | SYSTOLIC BLOOD PRESSURE: 117 MMHG | HEART RATE: 61 BPM | OXYGEN SATURATION: 95 % | HEIGHT: 62 IN

## 2018-01-30 DIAGNOSIS — I10 ESSENTIAL HYPERTENSION: Primary | ICD-10-CM

## 2018-01-30 DIAGNOSIS — H91.8X3 OTHER SPECIFIED HEARING LOSS OF BOTH EARS: ICD-10-CM

## 2018-01-30 DIAGNOSIS — E11.9 DIABETES MELLITUS WITHOUT COMPLICATION (HCC): ICD-10-CM

## 2018-01-30 DIAGNOSIS — E78.5 DYSLIPIDEMIA: ICD-10-CM

## 2018-01-30 NOTE — PROGRESS NOTES
1. Have you been to the ER, urgent care clinic since your last visit? Hospitalized since your last visit? No    2. Have you seen or consulted any other health care providers outside of the [address] [place] [address] since your last visit? Include any pap smears or colon screening.  No

## 2018-01-30 NOTE — PATIENT INSTRUCTIONS

## 2018-01-30 NOTE — MR AVS SNAPSHOT
[address] [place] [address] 
 
 
 [address] [place] [address] 
[phone] Patient: Dahiana Pradhan MRN: [id number] :1970 Visit Information Date & Time Provider Department Dept. Phone Encounter #  
 2018  3:40 PM Anjel Palafox[address] [phone] [id number] Follow-up Instructions Return in about 6 months (around 2018) for well woman. Upcoming Health Maintenance Date Due HEMOGLOBIN A1C Q6M 2018* MICROALBUMIN Q1 2018* EYE EXAM RETINAL OR DILATED Q1 2018* LIPID PANEL Q1 3/17/2018 FOOT EXAM Q1 2019 PAP AKA CERVICAL CYTOLOGY 3/17/2020 DTaP/Tdap/Td series (2 - Td) 10/26/2026 *Topic was postponed. The date shown is not the original due date. Allergies as of 2018  Review Complete On: 2018 By: Nusrat Paulson LPN Severity Noted Reaction Type Reactions Contrave [Naltrexone-bupropion]  2017    Other (comments) Severe constipation Metformin  2015   Side Effect Nausea Only Current Immunizations  Reviewed on 2017 Name Date Influenza Vaccine (Quad) PF 10/25/2016 Not reviewed this visit You Were Diagnosed With   
  
 Codes Comments Essential hypertension    -  Primary ICD-10-CM: I10 
ICD-9-CM: 401.9 Diabetes mellitus without complication ([facility]     PCD-82-KA: E11.9 ICD-9-CM: 250.00 Dyslipidemia     ICD-10-CM: E78.5 ICD-9-CM: 272.4 Other specified hearing loss of both ears     ICD-10-CM: H91.8X3 ICD-9-CM: 731. 8 Vitals BP Pulse Temp Resp Height(growth percentile) Weight(growth percentile) 117/73 (BP 1 Location: Right arm, BP Patient Position: Sitting) 61 98.2 °F (36.8 °C) (Oral) 16 5' 2\" (1.575 m) 262 lb (118.8 kg) LMP SpO2 BMI OB Status Smoking Status 2018 (Exact Date) 95% 47.92 kg/m2 Having regular periods Never Smoker BMI and BSA Data Body Mass Index Body Surface Area  
 47.92 kg/m 2 2.28 m 2 Preferred Pharmacy Pharmacy Name Phone [address] [place] [address]. [phone] Your Updated Medication List  
  
   
This list is accurate as of: 1/30/18  4:49 PM.  Always use your most recent med list.  
  
  
  
  
 aspirin delayed-release 81 mg tablet Take 1 Tab by mouth daily. DEXILANT 30 mg capsule Generic drug:  dexlansoprazole TAKE ONE CAPSULE BY MOUTH ONCE DAILY AS NEEDED  
  
 magnesium oxide 400 mg tablet Commonly known as:  MAG-OX  
TAKE ONE TABLET BY MOUTH ONCE DAILY  
  
 metoprolol tartrate 25 mg tablet Commonly known as:  LOPRESSOR  
TAKE ONE-HALF TABLET BY MOUTH TWICE DAILY  
  
 multivitamin tablet Commonly known as:  ONE A DAY Take 1 Tab by mouth daily. nitroglycerin 0.4 mg SL tablet Commonly known as:  NITROSTAT Place 1 tablet under tongue at the start of chest pain every 5 minutes up to 3 doses. olmesartan-hydroCHLOROthiazide 20-12.5 mg per tablet Commonly known as:  BENICAR HCT Take 1 Tab by mouth daily. omega-3 acid ethyl esters 1 gram capsule Commonly known as:  Merribeth Hacker Take 1 Cap by mouth nightly. pitavastatin calcium 4 mg Tab tablet Commonly known as:  LIVALO Take 1 Tab by mouth nightly. We Performed the Following  DIABETES FOOT EXAM [HM7 Custom] REFERRAL TO ENT-OTOLARYNGOLOGY [FXK73 Custom] REFERRAL TO OPHTHALMOLOGY [REF57 Custom] Follow-up Instructions Return in about 6 months (around 7/30/2018) for well woman. To-Do List   
 01/30/2018 Lab:  ALT   
  
 01/30/2018 Lab:  AST   
  
 01/30/2018 Lab:  HEMOGLOBIN A1C WITH EAG   
  
 01/30/2018 Lab:  LIPID PANEL   
  
 01/30/2018 Lab:  METABOLIC PANEL, BASIC   
  
 01/30/2018 Lab:  MICROALBUMIN, UR, RAND W/ MICROALBUMIN/CREA RATIO   
  
 01/30/2018 Lab: MICROALBUMIN, UR, RAND W/ MICROALBUMIN/CREA RATIO Referral Information Referral ID Referred By Referred To [id number] [address] [place], [address] Phone: [phone] Fax: [phone] Visits Status Start Date End Date 1 New Request 1/30/18 1/30/19 If your referral has a status of pending review or denied, additional information will be sent to support the outcome of this decision. Referral ID Referred By Referred To [id number] Majo Rust MD  
   [address] [place] [address] [address] Phone: [phone] Fax: [phone] Visits Status Start Date End Date 1 New Request 1/30/18 1/30/19 If your referral has a status of pending review or denied, additional information will be sent to support the outcome of this decision. Patient Instructions Learning About Diabetes Food Guidelines Your Care Instructions Meal planning is important to manage diabetes. It helps keep your blood sugar at a target level (which you set with your doctor). You don't have to eat special foods. You can eat what your family eats, including sweets once in a while. But you do have to pay attention to how often you eat and how much you eat of certain foods. You may want to work with a dietitian or a certified diabetes educator (CDE) to help you plan meals and snacks. A dietitian or CDE can also help you lose weight if that is one of your goals. What should you know about eating carbs? Managing the amount of carbohydrate (carbs) you eat is an important part of healthy meals when you have diabetes. Carbohydrate is found in many foods. · Learn which foods have carbs. And learn the amounts of carbs in different foods.  
¨ Bread, cereal, pasta, and rice have about 15 grams of carbs in a serving. A serving is 1 slice of bread (1 ounce), ½ cup of cooked cereal, or 1/3 cup of cooked pasta or rice. ¨ Fruits have 15 grams of carbs in a serving. A serving is 1 small fresh fruit, such as an apple or orange; ½ of a banana; ½ cup of cooked or canned fruit; ½ cup of fruit juice; 1 cup of melon or raspberries; or 2 tablespoons of dried fruit. ¨ Milk and no-sugar-added yogurt have 15 grams of carbs in a serving. A serving is 1 cup of milk or 2/3 cup of no-sugar-added yogurt. ¨ Starchy vegetables have 15 grams of carbs in a serving. A serving is ½ cup of mashed potatoes or sweet potato; 1 cup winter squash; ½ of a small baked potato; ½ cup of cooked beans; or ½ cup cooked corn or green peas. · Learn how much carbs to eat each day and at each meal. A dietitian or CDE can teach you how to keep track of the amount of carbs you eat. This is called carbohydrate counting. · If you are not sure how to count carbohydrate grams, use the Plate Method to plan meals. It is a good, quick way to make sure that you have a balanced meal. It also helps you spread carbs throughout the day. ¨ Divide your plate by types of foods. Put non-starchy vegetables on half the plate, meat or other protein food on one-quarter of the plate, and a grain or starchy vegetable in the final quarter of the plate. To this you can add a small piece of fruit and 1 cup of milk or yogurt, depending on how many carbs you are supposed to eat at a meal. 
· Try to eat about the same amount of carbs at each meal. Do not \"save up\" your daily allowance of carbs to eat at one meal. 
· Proteins have very little or no carbs per serving. Examples of proteins are beef, chicken, turkey, fish, eggs, tofu, cheese, cottage cheese, and peanut butter. A serving size of meat is 3 ounces, which is about the size of a deck of cards.  Examples of meat substitute serving sizes (equal to 1 ounce of meat) are 1/4 cup of cottage cheese, 1 egg, 1 tablespoon of peanut butter, and ½ cup of tofu. How can you eat out and still eat healthy? · Learn to estimate the serving sizes of foods that have carbohydrate. If you measure food at home, it will be easier to estimate the amount in a serving of restaurant food. · If the meal you order has too much carbohydrate (such as potatoes, corn, or baked beans), ask to have a low-carbohydrate food instead. Ask for a salad or green vegetables. · If you use insulin, check your blood sugar before and after eating out to help you plan how much to eat in the future. · If you eat more carbohydrate at a meal than you had planned, take a walk or do other exercise. This will help lower your blood sugar. What else should you know? · Limit saturated fat, such as the fat from meat and dairy products. This is a healthy choice because people who have diabetes are at higher risk of heart disease. So choose lean cuts of meat and nonfat or low-fat dairy products. Use olive or canola oil instead of butter or shortening when cooking. · Don't skip meals. Your blood sugar may drop too low if you skip meals and take insulin or certain medicines for diabetes. · Check with your doctor before you drink alcohol. Alcohol can cause your blood sugar to drop too low. Alcohol can also cause a bad reaction if you take certain diabetes medicines. Follow-up care is a key part of your treatment and safety. Be sure to make and go to all appointments, and call your doctor if you are having problems. It's also a good idea to know your test results and keep a list of the medicines you take. Where can you learn more? Go to http://yane-cooper.info/. Enter V266 in the search box to learn more about \"Learning About Diabetes Food Guidelines. \" Current as of: March 13, 2017 Content Version: 11.4 © 7336-3624 Healthwise, Incorporated.  Care instructions adapted under license by [employer] (which disclaims liability or warranty for this information). If you have questions about a medical condition or this instruction, always ask your healthcare professional. [address] any warranty or liability for your use of this information. Introducing [facility] & HEALTH SERVICES! Dear Galina Johnson: Thank you for requesting a [employer] account. Our records indicate that you already have an active [employer] account. You can access your account anytime at [email or web address] Did you know that you can access your hospital and ER discharge instructions at any time in [employer]? You can also review all of your test results from your hospital stay or ER visit. Additional Information If you have questions, please visit the Frequently Asked Questions section of the [employer] website at [email or web address] Remember, [employer] is NOT to be used for urgent needs. For medical emergencies, dial 911. Now available from your iPhone and Android! Please provide this summary of care documentation to your next provider. Your primary care clinician is listed as Vernon Hernandez. If you have any questions after today's visit, please call [phone].

## 2018-01-30 NOTE — PROGRESS NOTES
HISTORY OF PRESENT ILLNESS  Mariela Coates is a 51 y.o. female. HPI  Patient is here today for evaluation and treatment of: Diabetes/Hypertension/Cholesterol problem    Diabetes:  Pt was diagnosed 2 years ago. She was started on metformin but med caused diarrhea so she stopped meds. Pt is exercising; she is walking everyday. Tries to walk 10, 0000 steps. Eating more fruits and vegetable    Wt Readings from Last 3 Encounters:   01/30/18 262 lb (118.8 kg)   06/29/17 262 lb (118.8 kg)   03/17/17 254 lb 12.8 oz (115.6 kg)         Hypertension:  BP is stable;  BPs are stable. She is on meds as listed below; Cholesterol: On livalo, and lovaza; Pt is not fasting. She is due for blood work'    She has noted some decreased hearing; Would like a a hearing eval. Turns her TV up loud at home. Has night sweats ; May awaken with leg cramps and HA; she snores, feels rested upon awakening mostly    Current Outpatient Prescriptions:     metoprolol tartrate (LOPRESSOR) 25 mg tablet, TAKE ONE-HALF TABLET BY MOUTH TWICE DAILY, Disp: 30 Tab, Rfl: 3    magnesium oxide (MAG-OX) 400 mg tablet, TAKE ONE TABLET BY MOUTH ONCE DAILY, Disp: 30 Tab, Rfl: 11    DEXILANT 30 mg capsule, TAKE ONE CAPSULE BY MOUTH ONCE DAILY AS NEEDED, Disp: 90 Cap, Rfl: 1    olmesartan-hydroCHLOROthiazide (BENICAR HCT) 20-12.5 mg per tablet, Take 1 Tab by mouth daily. , Disp: 90 Tab, Rfl: 3    pitavastatin (LIVALO) 4 mg tab tablet, Take 1 Tab by mouth nightly., Disp: 90 Tab, Rfl: 1    nitroglycerin (NITROSTAT) 0.4 mg SL tablet, Place 1 tablet under tongue at the start of chest pain every 5 minutes up to 3 doses. , Disp: 1 Bottle, Rfl: 3    aspirin delayed-release 81 mg tablet, Take 1 Tab by mouth daily. , Disp: 30 Tab, Rfl: 11    multivitamin (ONE A DAY) tablet, Take 1 Tab by mouth daily. , Disp: 30 Tab, Rfl: 11    omega-3 acid ethyl esters (LOVAZA) 1 gram capsule, Take 1 Cap by mouth nightly., Disp: 90 Cap, Rfl: 3      PMH,  Meds, Allergies, Family History, Social history reviewed    Review of Systems   Constitutional: Negative for chills, fever and weight loss. Cardiovascular: Negative for chest pain and leg swelling. Genitourinary: Negative for dysuria, frequency and urgency. Musculoskeletal: Positive for back pain. Physical Exam   Constitutional: She appears well-developed and well-nourished. No distress. Neck: Neck supple. No thyromegaly present. No caroitd bruits   Cardiovascular: Normal rate and regular rhythm. Exam reveals no gallop and no friction rub. No murmur heard. Pulmonary/Chest: Breath sounds normal. No respiratory distress. She has no wheezes. She has no rales. Nursing note reviewed. TMs clear  Sensory exam of the foot is normal, tested with the monofilament. Good pulses, no lesions or ulcers, good peripheral pulses. Visit Vitals    /73 (BP 1 Location: Right arm, BP Patient Position: Sitting)    Pulse 61    Temp 98.2 °F (36.8 °C) (Oral)    Resp 16    Ht 5' 2\" (1.575 m)    Wt 262 lb (118.8 kg)    LMP 01/20/2018 (Exact Date)    SpO2 95%    BMI 47.92 kg/m2         ASSESSMENT and PLAN    ICD-10-CM ICD-9-CM    1. Essential hypertension A90 080.0 METABOLIC PANEL, BASIC   2. Diabetes mellitus without complication (HCC) Q99.7 250.00 HEMOGLOBIN A1C WITH EAG       DIABETES FOOT EXAM      LIPID PANEL      AST      ALT      MICROALBUMIN, UR, RAND W/ MICROALBUMIN/CREA RATIO      REFERRAL TO OPHTHALMOLOGY   3. Dyslipidemia E78.5 272.4 MICROALBUMIN, UR, RAND W/ MICROALBUMIN/CREA RATIO   4.  Other specified hearing loss of both ears H91.8X3 389.8 REFERRAL TO ENT-OTOLARYNGOLOGY       As abve, all new to this provider   treatment plan as listed below  Orders Placed This Encounter    HEMOGLOBIN A1C WITH EAG    LIPID PANEL    AST    ALT    METABOLIC PANEL, BASIC    MICROALBUMIN, UR, RAND W/ MICROALBUMIN/CREA RATIO    MICROALBUMIN, UR, RAND W/ MICROALBUMIN/CREA RATIO   Josiah Desai [facility] Ref [facility]    REFERRAL TO OPHTHALMOLOGY    HM DIABETES FOOT EXAM     Weight loss advised  Follow-up Disposition:  Return in about 6 months (around 7/30/2018) for well woman. An After Visit Summary was printed and given to the patient. This has been fully explained to the patient, who indicates understanding.

## 2018-02-13 DIAGNOSIS — I10 HYPERTENSION, GOAL BELOW 140/80: ICD-10-CM

## 2018-02-13 DIAGNOSIS — I10 ESSENTIAL HYPERTENSION: ICD-10-CM

## 2018-02-13 DIAGNOSIS — E78.5 DYSLIPIDEMIA: ICD-10-CM

## 2018-02-13 DIAGNOSIS — E11.9 TYPE 2 DIABETES MELLITUS WITHOUT COMPLICATION, WITHOUT LONG-TERM CURRENT USE OF INSULIN (HCC): ICD-10-CM

## 2018-02-13 RX ORDER — OLMESARTAN MEDOXOMIL AND HYDROCHLOROTHIAZIDE 20/12.5 20; 12.5 MG/1; MG/1
1 TABLET ORAL DAILY
Qty: 90 TAB | Refills: 3 | Status: SHIPPED | OUTPATIENT
Start: 2018-02-13 | End: 2019-01-15 | Stop reason: SDUPTHER

## 2018-02-13 NOTE — TELEPHONE ENCOUNTER
From: Alejandra Petersen  To: Nathalia Butler NP  Sent: 2/13/2018 8:48 AM EST  Subject: Medication Renewal Request    Original authorizing provider: ERICK Mcdowell would like a refill of the following medications:  olmesartan-hydroCHLOROthiazide (BENICAR HCT) 20-12.5 mg per tablet Pasquale Wells NP]  pitavastatin (LIVALO) 4 mg tab tablet Pasquale Wells NP]    Preferred pharmacy: [address] [place] [address] [facility],   [address].     Comment:

## 2018-03-15 ENCOUNTER — OFFICE VISIT (OUTPATIENT)
Dept: CARDIOLOGY CLINIC | Age: 48
End: 2018-03-15

## 2018-03-15 VITALS
HEIGHT: 62 IN | HEART RATE: 64 BPM | SYSTOLIC BLOOD PRESSURE: 136 MMHG | DIASTOLIC BLOOD PRESSURE: 80 MMHG | WEIGHT: 265 LBS | OXYGEN SATURATION: 96 % | BODY MASS INDEX: 48.76 KG/M2

## 2018-03-15 DIAGNOSIS — I24.9 ACS (ACUTE CORONARY SYNDROME) (HCC): ICD-10-CM

## 2018-03-15 DIAGNOSIS — I25.10 CORONARY ARTERY DISEASE INVOLVING NATIVE CORONARY ARTERY OF NATIVE HEART WITHOUT ANGINA PECTORIS: Primary | ICD-10-CM

## 2018-03-15 DIAGNOSIS — I10 HYPERTENSION, GOAL BELOW 140/80: ICD-10-CM

## 2018-03-15 DIAGNOSIS — E78.5 DYSLIPIDEMIA: ICD-10-CM

## 2018-03-15 NOTE — MR AVS SNAPSHOT
[facility] [address] [address] [address] [address] [phone] Patient: Dahiana Pradhan MRN: [id number] :1970 Visit Information Date & Time Provider Department Dept. Phone Encounter #  
 3/15/2018  4:00 PM Livia Suresh MD [facility] [address] [id number] Your Appointments 2018  2:40 PM  
Complete Physical with Anjel Palafox MD  
[address] [place] [address] Appt Note: well woman exam  
 [address], [place] [address] [address]  
[phone]  
  
   
 [address] Upcoming Health Maintenance Date Due  
 LIPID PANEL Q1 3/17/2018 HEMOGLOBIN A1C Q6M 2018* MICROALBUMIN Q1 2018* EYE EXAM RETINAL OR DILATED Q1 2018* FOOT EXAM Q1 2019 PAP AKA CERVICAL CYTOLOGY 3/17/2020 DTaP/Tdap/Td series (2 - Td) 10/26/2026 *Topic was postponed. The date shown is not the original due date. Allergies as of 3/15/2018  Review Complete On: 3/15/2018 By: Pili Delgado LPN Severity Noted Reaction Type Reactions Contrave [Naltrexone-bupropion]  2017    Other (comments) Severe constipation Metformin  2015   Side Effect Nausea Only Current Immunizations  Reviewed on 2017 Name Date Influenza Vaccine (Quad) PF 10/25/2016 Not reviewed this visit You Were Diagnosed With   
  
 Codes Comments ACS (acute coronary syndrome) ([facility]    -  Primary ICD-10-CM: I24.9 ICD-9-CM: 411.1 Vitals BP Pulse Height(growth percentile) Weight(growth percentile) SpO2 BMI  
 136/80 64 5' 2\" (1.575 m) 265 lb (120.2 kg) 96% 48.47 kg/m2 OB Status Smoking Status Having regular periods Never Smoker BMI and BSA Data Body Mass Index Body Surface Area  
 48.47 kg/m 2 2.29 m 2 Preferred Pharmacy Pharmacy Name Phone [address]. [phone] Your Updated Medication List  
  
   
This list is accurate as of 3/15/18  4:25 PM.  Always use your most recent med list.  
  
  
  
  
 aspirin delayed-release 81 mg tablet Take 1 Tab by mouth daily. DEXILANT 30 mg capsule Generic drug:  dexlansoprazole TAKE ONE CAPSULE BY MOUTH ONCE DAILY AS NEEDED  
  
 magnesium oxide 400 mg tablet Commonly known as:  MAG-OX  
TAKE ONE TABLET BY MOUTH ONCE DAILY  
  
 metoprolol tartrate 25 mg tablet Commonly known as:  LOPRESSOR  
TAKE ONE-HALF TABLET BY MOUTH TWICE DAILY  
  
 multivitamin tablet Commonly known as:  ONE A DAY Take 1 Tab by mouth daily. nitroglycerin 0.4 mg SL tablet Commonly known as:  NITROSTAT Place 1 tablet under tongue at the start of chest pain every 5 minutes up to 3 doses. olmesartan-hydroCHLOROthiazide 20-12.5 mg per tablet Commonly known as:  BENICAR HCT Take 1 Tab by mouth daily. omega-3 acid ethyl esters 1 gram capsule Commonly known as:  Donne Ro Take 1 Cap by mouth nightly. pitavastatin calcium 4 mg Tab tablet Commonly known as:  LIVALO Take 1 Tab by mouth nightly. We Performed the Following AMB POC EKG ROUTINE W/ 12 LEADS, INTER & REP [47267 CPT(R)] Introducing [facility] SERVICES! Dear Aminta Hollingsworth: Thank you for requesting a [employer] account. Our records indicate that you already have an active [employer] account. You can access your account anytime at [email or web address] Did you know that you can access your hospital and ER discharge instructions at any time in [employer]? You can also review all of your test results from your hospital stay or ER visit. Additional Information If you have questions, please visit the Frequently Asked Questions section of the [employer] website at [email or web address] Remember, MyChart is NOT to be used for urgent needs. For medical emergencies, dial 911. Now available from your iPhone and Android! Please provide this summary of care documentation to your next provider. Your primary care clinician is listed as Taylor Hernandez. If you have any questions after today's visit, please call [phone].

## 2018-03-15 NOTE — PROGRESS NOTES
HISTORY OF PRESENT ILLNESS  Shubham Webb is a 51 y.o. female. ASSESSMENT and PLAN    Ms. Dawood Sheehan has history of CAD. She presented back in December 2011 with atypical chest pains but abnormal EKG. Her coronary angiogram revealed ostial 90-95% LAD stenosis. She subsequently underwent single vessel LIMA to LAD bypass surgery. Since her bypass surgery, she presented twice with chest pains to the emergency room which were again quite atypical. She had nuclear scans done both instances which were normal.  Back in November 2016, [facility] with chest [facility]. Nuclear scan showed ejection fraction of 60% but apical filling defect. Ultimately, repeat coronary angiography was performed. It was noted that her HECTOR to LAD was widely patent. · CAD:    Symptomatically stable. · BP:    Well-controlled. · HR:    Stable. · CHF:   There is no evidence of decompensated CHF noted. · Weight:   His current weight is 265 pounds. Her baseline weight is 250 pounds. Strong encouragement was given for weight control. Over 20 minutes spent on discussion alone. · Cholesterol:   Target LDL <70. She remains on Crestor 40 mg daily. · Anti-platelet:   Remains on ASA. I will see her back in one year. Thank you. Encounter Diagnoses   Name Primary?     Coronary artery disease involving native coronary artery of native heart without angina pectoris Yes    ACS (acute coronary syndrome) (HCC)     Class 3 obesity without serious comorbidity in adult, unspecified BMI, unspecified obesity type     Dyslipidemia     Hypertension, goal below 140/80      current treatment plan is effective, no change in therapy  lab results and schedule of future lab studies reviewed with patient  reviewed diet, exercise and weight control  cardiovascular risk and specific lipid/LDL goals reviewed  use of aspirin to prevent MI and TIA's discussed      HPI  Today, Ms. Nicole Vergara has no complaints of chest pains, increased shortness of breath or decreased exercise capacity. She denies any orthopnea or PND. She denies any palpitations or dizziness. She is accompanied by her . Review of Systems   Respiratory: Negative for shortness of breath. Cardiovascular: Negative for chest pain, palpitations, orthopnea, claudication, leg swelling and PND. Musculoskeletal: Positive for joint pain. All other systems reviewed and are negative. Physical Exam   Constitutional: She is oriented to person, place, and time. She appears well-developed and well-nourished. HENT:   Head: Normocephalic. Eyes: Conjunctivae are normal.   Neck: Neck supple. No JVD present. Carotid bruit is not present. No thyromegaly present. Cardiovascular: Normal rate and regular rhythm. No murmur heard. Pulmonary/Chest: Breath sounds normal.   Abdominal: Bowel sounds are normal.   Musculoskeletal: She exhibits no edema. Neurological: She is alert and oriented to person, place, and time. Skin: Skin is warm and dry. Nursing note and vitals reviewed. PCP: Delroy Wallis NP    Past Medical History:   Diagnosis Date    Abnormal PFT 10/1/2015    Dr. Wally Schroeder, Pulmonology    Acid reflux     CABG x 1 12/31/2011    LIMA-LAD    CAD (coronary artery disease)     Cardiac cath 11/23/2016    R dom. oLAD 85%. Otherwise patent coronaries. HECTOR to LAD patent. LVEDP 22 mmHg. EF 55%. No RWMA.  Cardiac echocardiogram 07/28/2015    Tech difficult. EF 50-55%. No WMA. Mild conc LVH. Gr 2 DDfx. RVSP 45-50 mmHg. Mild LAE. Mild ROSITA. Mod TR. Mild IVCE.  Cardiac nuclear imaging test, high risk 11/23/2016    High risk. Mainly fixed anteroapical defect w/partial reversibility. EF 60%. No RWMA. Nondiagnostic EKG on pharm stress test.    Cardiovascular upper extremity arterial duplex 01/03/2014    No significant occlusive arterial disease bilaterally.     Coronary atherosclerosis of native coronary artery  Diabetes mellitus ([facility] 14    hgbA1C 6.8    [facility]     Dyslipidemia     GERD (gastroesophageal reflux disease)     H/O screening mammography 2016    no evidence of malignancy    Hypercholesterolemia     Hypomagnesemia 14    1.4    Pleural effusion, left     s/p CABG and thoracentesis with removal of 900 mL    Pulmonary arterial hypertension        Past Surgical History:   Procedure Laterality Date    CARDIAC CATHETERIZATION  2016         CARDIAC SURG PROCEDURE UNLIST      CORONARY ARTERY ANGIOGRAM  2016         HX  SECTION      HX CORONARY ARTERY BYPASS GRAFT  12/31/2011    x1    LV ANGIOGRAPHY  2016            Current Outpatient Prescriptions   Medication Sig Dispense Refill    olmesartan-hydroCHLOROthiazide (BENICAR HCT) 20-12.5 mg per tablet Take 1 Tab by mouth daily. 90 Tab 3    pitavastatin calcium (LIVALO) 4 mg tab tablet Take 1 Tab by mouth nightly. 90 Tab 1    metoprolol tartrate (LOPRESSOR) 25 mg tablet TAKE ONE-HALF TABLET BY MOUTH TWICE DAILY 30 Tab 3    magnesium oxide (MAG-OX) 400 mg tablet TAKE ONE TABLET BY MOUTH ONCE DAILY 30 Tab 11    DEXILANT 30 mg capsule TAKE ONE CAPSULE BY MOUTH ONCE DAILY AS NEEDED 90 Cap 1    nitroglycerin (NITROSTAT) 0.4 mg SL tablet Place 1 tablet under tongue at the start of chest pain every 5 minutes up to 3 doses. 1 Bottle 3    aspirin delayed-release 81 mg tablet Take 1 Tab by mouth daily. 30 Tab 11    multivitamin (ONE A DAY) tablet Take 1 Tab by mouth daily. 30 Tab 11    omega-3 acid ethyl esters (LOVAZA) 1 gram capsule Take 1 Cap by mouth nightly.  90 Cap 3       The patient has a family history of    Social History   Substance Use Topics    Smoking status: Never Smoker    Smokeless tobacco: Never Used      Comment: 2nd home smoking from mother during 1st 17 yrs of life    Alcohol use No       Lab Results   Component Value Date/Time    Cholesterol, total 159 2017 09:56 AM HDL Cholesterol 68 (H) 03/17/2017 09:56 AM    LDL, calculated 75 03/17/2017 09:56 AM    Triglyceride 80 03/17/2017 09:56 AM    CHOL/HDL Ratio 2.3 03/17/2017 09:56 AM        BP Readings from Last 3 Encounters:   03/15/18 136/80   01/30/18 117/73   06/29/17 128/82        Pulse Readings from Last 3 Encounters:   03/15/18 64   01/30/18 61   06/29/17 62       Wt Readings from Last 3 Encounters:   03/15/18 120.2 kg (265 lb)   01/30/18 118.8 kg (262 lb)   06/29/17 118.8 kg (262 lb)         EKG: unchanged from previous tracings, normal sinus rhythm, T-wave inversions noted in leads V1 through V4 as well as ST depression noted laterally and 1, aVL, V5 and V6.

## 2018-03-15 NOTE — PROGRESS NOTES
1. Have you been to the ER, urgent care clinic since your last visit? Hospitalized since your last visit? No    2. Have you seen or consulted any other health care providers outside of the [address] [place] [address] since your last visit? Include any pap smears or colon screening.  No

## 2018-04-25 ENCOUNTER — HOSPITAL ENCOUNTER (OUTPATIENT)
Dept: MAMMOGRAPHY | Age: 48
Discharge: HOME | End: 2018-04-25
Attending: NURSE PRACTITIONER
Payer: COMMERCIAL

## 2018-04-25 DIAGNOSIS — Z12.31 VISIT FOR SCREENING MAMMOGRAM: ICD-10-CM

## 2018-04-25 PROCEDURE — 77067 SCR MAMMO BI INCL CAD: CPT

## 2018-07-13 DIAGNOSIS — K21.9 GASTROESOPHAGEAL REFLUX DISEASE WITHOUT ESOPHAGITIS: ICD-10-CM

## 2018-07-13 RX ORDER — DEXLANSOPRAZOLE 30 MG/1
CAPSULE, DELAYED RELEASE ORAL
Qty: 90 CAP | Refills: 1 | Status: SHIPPED | OUTPATIENT
Start: 2018-07-13 | End: 2019-02-21

## 2018-07-30 ENCOUNTER — OFFICE VISIT (OUTPATIENT)
Dept: FAMILY MEDICINE CLINIC | Age: 48
End: 2018-07-30

## 2018-07-30 VITALS
DIASTOLIC BLOOD PRESSURE: 62 MMHG | HEART RATE: 70 BPM | RESPIRATION RATE: 16 BRPM | WEIGHT: 268 LBS | SYSTOLIC BLOOD PRESSURE: 117 MMHG | TEMPERATURE: 99.2 F | BODY MASS INDEX: 49.32 KG/M2 | HEIGHT: 62 IN | OXYGEN SATURATION: 95 %

## 2018-07-30 DIAGNOSIS — Z00.00 WELL WOMAN EXAM (NO GYNECOLOGICAL EXAM): Primary | ICD-10-CM

## 2018-07-30 DIAGNOSIS — H91.8X1 OTHER SPECIFIED HEARING LOSS OF RIGHT EAR, UNSPECIFIED HEARING STATUS ON CONTRALATERAL SIDE: ICD-10-CM

## 2018-07-30 DIAGNOSIS — Z13.6 SCREENING FOR CARDIOVASCULAR CONDITION: ICD-10-CM

## 2018-07-30 DIAGNOSIS — E11.65 TYPE 2 DIABETES MELLITUS WITH HYPERGLYCEMIA, WITHOUT LONG-TERM CURRENT USE OF INSULIN (HCC): ICD-10-CM

## 2018-07-30 DIAGNOSIS — Z12.39 SCREENING FOR BREAST CANCER: ICD-10-CM

## 2018-07-30 RX ORDER — GABAPENTIN 300 MG/1
300 CAPSULE ORAL
COMMUNITY
End: 2020-02-12

## 2018-07-30 NOTE — MR AVS SNAPSHOT
[facility] 
 
 
 [address] [address] [address] [address] 
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 Phone Encounter #  
 2018  2:40 PM Jazmin Negro[address] [phone] [id number] Follow-up Instructions Return in about 4 months (around 2018) for dm. Your Appointments 3/19/2019  8:40 AM  
Follow Up with Virgen Birmingham MD  
[facility] [place] [address] ([facility]) Appt Note: 1 yr f/u with EKG  
 [address] [place] [id number] [address] [address] [phone] [address] [place] [address] [address] Upcoming Health Maintenance Date Due HEMOGLOBIN A1C Q6M 2018* MICROALBUMIN Q1 2018* LIPID PANEL Q1 2018* EYE EXAM RETINAL OR DILATED Q1 2019* Influenza Age 5 to Adult 2018 FOOT EXAM Q1 2019 PAP AKA CERVICAL CYTOLOGY 3/17/2020 DTaP/Tdap/Td series (2 - Td) 10/26/2026 *Topic was postponed. The date shown is not the original due date. Allergies as of 2018  Review Complete On: 2018 By: Josh Miguel LPN Severity Noted Reaction Type Reactions Contrave [Naltrexone-bupropion]  2017    Other (comments) Severe constipation Metformin  2015   Side Effect Nausea Only Current Immunizations  Reviewed on 2017 Name Date Influenza Vaccine (Quad) PF 10/25/2016 Not reviewed this visit You Were Diagnosed With   
  
 Codes Comments Well woman exam (no gynecological exam)    -  Primary ICD-10-CM: Z00.00 ICD-9-CM: V70.0 [V70.0] Screening for cardiovascular condition     ICD-10-CM: Z13.6 ICD-9-CM: V81.2 Type 2 diabetes mellitus with hyperglycemia, without long-term current use of insulin (HCC)     ICD-10-CM: E11.65 ICD-9-CM: 250.00, 790.29 Other specified hearing loss of right ear, unspecified hearing status on contralateral side     ICD-10-CM: H91.8X1 ICD-9-CM: 389.8 Screening for breast cancer     ICD-10-CM: Z12.31 
ICD-9-CM: V76.10 Vitals BP Pulse Temp Resp Height(growth percentile) Weight(growth percentile)  
 117/62 (BP 1 Location: Left arm, BP Patient Position: Sitting) 70 99.2 °F (37.3 °C) (Oral) 16 5' 2\" (1.575 m) 268 lb (121.6 kg) LMP SpO2 BMI OB Status Smoking Status 07/06/2018 95% 49.02 kg/m2 Having regular periods Never Smoker BMI and BSA Data Body Mass Index Body Surface Area 49.02 kg/m 2 2.31 m 2 Preferred Pharmacy Pharmacy Name Phone Ashu Guan [address] [place] [address]. [phone] Your Updated Medication List  
  
   
This list is accurate as of 7/30/18  3:48 PM.  Always use your most recent med list.  
  
  
  
  
 aspirin delayed-release 81 mg tablet Take 1 Tab by mouth daily. DEXILANT 30 mg capsule Generic drug:  dexlansoprazole TAKE ONE CAPSULE BY MOUTH ONCE DAILY AS NEEDED  
  
 gabapentin 300 mg capsule Commonly known as:  NEURONTIN Take 300 mg by mouth. Take 2 tablet by mouth in the morning, take 1 tablet by mouth at noon, take 1 tablet by mouth at 6:00PM, and take 2 tablets by mouth at bedtime. magnesium oxide 400 mg tablet Commonly known as:  MAG-OX  
TAKE ONE TABLET BY MOUTH ONCE DAILY  
  
 metoprolol tartrate 25 mg tablet Commonly known as:  LOPRESSOR  
TAKE ONE-HALF TABLET BY MOUTH TWICE DAILY  
  
 multivitamin tablet Commonly known as:  ONE A DAY Take 1 Tab by mouth daily. nitroglycerin 0.4 mg SL tablet Commonly known as:  NITROSTAT Place 1 tablet under tongue at the start of chest pain every 5 minutes up to 3 doses. olmesartan-hydroCHLOROthiazide 20-12.5 mg per tablet Commonly known as:  BENICAR HCT Take 1 Tab by mouth daily. omega-3 acid ethyl esters 1 gram capsule Commonly known as:  [place] Take 1 Cap by mouth nightly. pitavastatin calcium 4 mg Tab tablet Commonly known as:  LIVALO Take 1 Tab by mouth nightly. We Performed the Following REFERRAL TO ENT-OTOLARYNGOLOGY [BLR30 Custom] Follow-up Instructions Return in about 4 months (around 11/30/2018) for dm. To-Do List   
 07/30/2018 Lab:  HEMOGLOBIN A1C WITH EAG   
  
 07/30/2018 Lab:  LIPID PANEL   
  
 07/30/2018 Imaging:  GIANNI MAMMO BI SCREENING INCL CAD   
  
 07/30/2018 Lab:  METABOLIC PANEL, COMPREHENSIVE   
  
 07/30/2018 Lab:  MICROALBUMIN, UR, RAND W/ MICROALB/CREAT RATIO Referral Information Referral ID Referred By Referred To  
  
 [id number] Minus MD Doris   
   [address], [address] Phone: [phone] Fax: [phone] Visits Status Start Date End Date 1 New Request 7/30/18 7/30/19 If your referral has a status of pending review or denied, additional information will be sent to support the outcome of this decision. Patient Instructions Well Visit, Ages 25 to 48: Care Instructions Your Care Instructions Physical exams can help you stay healthy. Your doctor has checked your overall health and may have suggested ways to take good care of yourself. He or she also may have recommended tests. At home, you can help prevent illness with healthy eating, regular exercise, and other steps. Follow-up care is a key part of your treatment and safety. Be sure to make and go to all appointments, and call your doctor if you are having problems. It's also a good idea to know your test results and keep a list of the medicines you take. How can you care for yourself at home? · Reach and stay at a healthy weight. This will lower your risk for many problems, such as obesity, diabetes, heart disease, and high blood pressure. · Get at least 30 minutes of physical activity on most days of the week. Walking is a good choice. You also may want to do other activities, such as running, swimming, cycling, or playing tennis or team sports. Discuss any changes in your exercise program with your doctor. · Do not smoke or allow others to smoke around you. If you need help quitting, talk to your doctor about stop-smoking programs and medicines. These can increase your chances of quitting for good. · Talk to your doctor about whether you have any risk factors for sexually transmitted infections (STIs). Having one sex partner (who does not have STIs and does not have sex with anyone else) is a good way to avoid these infections. · Use birth control if you do not want to have children at this time. Talk with your doctor about the choices available and what might be best for you. · Protect your skin from too much sun. When you're outdoors from 10 a.m. to 4 p.m., stay in the shade or cover up with clothing and a hat with a wide brim. Wear sunglasses that block UV rays. Even when it's cloudy, put broad-spectrum sunscreen (SPF 30 or higher) on any exposed skin. · See a dentist one or two times a year for checkups and to have your teeth cleaned. · Wear a seat belt in the car. · Drink alcohol in moderation, if at all. That means no more than 2 drinks a day for men and 1 drink a day for women. Follow your doctor's advice about when to have certain tests. These tests can spot problems early. For everyone · Cholesterol. Have the fat (cholesterol) in your blood tested after age 21. Your doctor will tell you how often to have this done based on your age, family history, or other things that can increase your risk for heart disease. · Blood pressure. Have your blood pressure checked during a routine doctor visit. Your doctor will tell you how often to check your blood pressure based on your age, your blood pressure results, and other factors. · Vision. Talk with your doctor about how often to have a glaucoma test. 
· Diabetes. Ask your doctor whether you should have tests for diabetes. · Colon cancer. Have a test for colon cancer at age 48. You may have one of several tests. If you are younger than 48, you may need a test earlier if you have any risk factors. Risk factors include whether you already had a precancerous polyp removed from your colon or whether your parent, brother, sister, or child has had colon cancer. For women · Breast exam and mammogram. Talk to your doctor about when you should have a clinical breast exam and a mammogram. Medical experts differ on whether and how often women under 50 should have these tests. Your doctor can help you decide what is right for you. · Pap test and pelvic exam. Begin Pap tests at age 24. A Pap test is the best way to find cervical cancer. The test often is part of a pelvic exam. Ask how often to have this test. 
· Tests for sexually transmitted infections (STIs). Ask whether you should have tests for STIs. You may be at risk if you have sex with more than one person, especially if your partners do not wear condoms. For men · Tests for sexually transmitted infections (STIs). Ask whether you should have tests for STIs. You may be at risk if you have sex with more than one person, especially if you do not wear a condom. · Testicular cancer exam. Ask your doctor whether you should check your testicles regularly. · Prostate exam. Talk to your doctor about whether you should have a blood test (called a PSA test) for prostate cancer. Experts differ on whether and when men should have this test. Some experts suggest it if you are older than 39 and are -American or have a father or brother who got prostate cancer when he was younger than 72. When should you call for help?  
Watch closely for changes in your health, and be sure to contact your doctor if you have any problems or symptoms that concern you. Where can you learn more? Go to http://yane-cooper.info/. Enter P072 in the search box to learn more about \"Well Visit, Ages 25 to 48: Care Instructions. \" Current as of: May 16, 2017 Content Version: 11.7 © 7760-2329 "[employer]". Care instructions adapted under license by [employer] (which disclaims liability or warranty for this information). If you have questions about a medical condition or this instruction, always ask your healthcare professional. [address] any warranty or liability for your use of this information. Introducing hospitals & HEALTH SERVICES! Dear Manuelito Brady: Thank you for requesting a [employer] account. Our records indicate that you already have an active [employer] account. You can access your account anytime at [email or web address] Did you know that you can access your hospital and ER discharge instructions at any time in [employer] You can also review all of your test results from your hospital stay or ER visit. Additional Information If you have questions, please visit the Frequently Asked Questions section of the [employer] website at [email or web address] Remember, [employer] is NOT to be used for urgent needs. For medical emergencies, dial 911. Now available from your iPhone and Android! Please provide this summary of care documentation to your next provider. Your primary care clinician is listed as Matt Hernandez. If you have any questions after today's visit, please call [phone].

## 2018-07-30 NOTE — PROGRESS NOTES
1. Have you been to the ER, urgent care clinic since your last visit? Hospitalized since your last visit? No 
 
2. Have you seen or consulted any other health care providers outside of the [address] [place] [address] since your last visit? Include any pap smears or colon screening. Yes Where: neurology - Renato Webb MD  / podiatry - ?

## 2018-07-30 NOTE — PATIENT INSTRUCTIONS

## 2018-08-04 PROBLEM — E11.40 TYPE 2 DIABETES MELLITUS WITH DIABETIC NEUROPATHY (HCC): Status: ACTIVE | Noted: 2018-08-04

## 2018-08-04 NOTE — PROGRESS NOTES
Subjective:  
51 y.o. female for Well Woman Check. She is in a left aircast boot on the left foot for nerve damage due to injury; 
 
DM eye exam is next month. Sees Dr. Madi Robb for CAD. C/o hearing loss ; R>L, agrees to seeing ENT: 
Needs a mammogarm Patient Active Problem List  
 Diagnosis Date Noted  Type 2 diabetes mellitus with diabetic neuropathy ([facility] 08/04/2018  Pulmonary arterial hypertension ([facility]  Chest pain 11/23/2016  ACS (acute coronary syndrome) ([facility] 11/22/2016  Hypertension, goal below 140/80 09/26/2016  Gastroesophageal reflux disease without esophagitis 12/07/2015  Abnormal PFT 10/01/2015  Diabetes mellitus ([facility] 09/24/2014  Hypomagnesemia 08/18/2014  BMI 45.0-49.9, adult ([facility] 05/14/2014  Obesity 01/31/2012  Dyslipidemia 01/31/2012  CAD (coronary artery disease), native coronary artery 01/23/2012  S/P CABG x 1 01/23/2012 Current Outpatient Prescriptions Medication Sig Dispense Refill  gabapentin (NEURONTIN) 300 mg capsule Take 300 mg by mouth. Take 2 tablet by mouth in the morning, take 1 tablet by mouth at noon, take 1 tablet by mouth at 6:00PM, and take 2 tablets by mouth at bedtime.  DEXILANT 30 mg capsule TAKE ONE CAPSULE BY MOUTH ONCE DAILY AS NEEDED 90 Cap 1  
 olmesartan-hydroCHLOROthiazide (BENICAR HCT) 20-12.5 mg per tablet Take 1 Tab by mouth daily. 90 Tab 3  pitavastatin calcium (LIVALO) 4 mg tab tablet Take 1 Tab by mouth nightly. 90 Tab 1  
 metoprolol tartrate (LOPRESSOR) 25 mg tablet TAKE ONE-HALF TABLET BY MOUTH TWICE DAILY 30 Tab 3  
 magnesium oxide (MAG-OX) 400 mg tablet TAKE ONE TABLET BY MOUTH ONCE DAILY 30 Tab 11  
 nitroglycerin (NITROSTAT) 0.4 mg SL tablet Place 1 tablet under tongue at the start of chest pain every 5 minutes up to 3 doses. 1 Bottle 3  
 aspirin delayed-release 81 mg tablet Take 1 Tab by mouth daily. 30 Tab 11  
 multivitamin (ONE A DAY) tablet Take 1 Tab by mouth daily.  30 Tab 11  omega-3 acid ethyl esters (LOVAZA) 1 gram capsule Take 1 Cap by mouth nightly. 90 Cap 3 Allergies Allergen Reactions  Contrave [Naltrexone-Bupropion] Other (comments) Severe constipation  Metformin Nausea Only Past Medical History:  
Diagnosis Date  Abnormal PFT 10/1/2015 Dr. Jihan Crane, Pulmonology  Acid reflux  CABG x 1 2011 LIMA-LAD  CAD (coronary artery disease)  Cardiac cath 2016 R dom. oLAD 85%. Otherwise patent coronaries. HECTOR to LAD patent. LVEDP 22 mmHg. EF 55%. No RWMA.  Cardiac echocardiogram 2015 Tech difficult. EF 50-55%. No WMA. Mild conc LVH. Gr 2 DDfx. RVSP 45-50 mmHg. Mild LAE. Mild ROSITA. Mod TR. Mild IVCE.  Cardiac nuclear imaging test, high risk 2016 High risk. Mainly fixed anteroapical defect w/partial reversibility. EF 60%. No RWMA. Nondiagnostic EKG on pharm stress test.  
 Cardiovascular upper extremity arterial duplex 2014 No significant occlusive arterial disease bilaterally.  Coronary atherosclerosis of native coronary artery  Diabetes mellitus ([address] 14  
 hgbA1C 6.8  Diabetic eye exam ([address]   Dyslipidemia  GERD (gastroesophageal reflux disease)  H/O screening mammography 2016  
 no evidence of malignancy  Hypercholesterolemia  Hypomagnesemia 14  
 1.4  Pleural effusion, left   
 s/p CABG and thoracentesis with removal of 900 mL  Pulmonary arterial hypertension ([address] Past Surgical History:  
Procedure Laterality Date  CARDIAC CATHETERIZATION  2016  CARDIAC SURG PROCEDURE UNLIST  CORONARY ARTERY ANGIOGRAM  2016  HX  SECTION    
 HX CORONARY ARTERY BYPASS GRAFT  12/31/2011  
 x1  
 LV ANGIOGRAPHY  2016 Family History Problem Relation Age of Onset  Diabetes Mother  Hypertension Father  Alzheimer Father 61 Social History Substance Use Topics  Smoking status: Never Smoker  Smokeless tobacco: Never Used Comment: 2nd home smoking from mother during 1st 17 yrs of life  Alcohol use No  
  
 
Lab Results Component Value Date/Time WBC 9.5 11/24/2016 01:57 AM  
 HGB 12.4 11/24/2016 01:57 AM  
 Hemoglobin, POC 7.8 (L) 01/01/2012 04:07 AM  
 HCT 37.2 11/24/2016 01:57 AM  
 Hematocrit, POC 23 (L) 01/01/2012 04:07 AM  
 PLATELET 524 41/20/7634 01:57 AM  
 MCV 84.9 11/24/2016 01:57 AM  
 
Lab Results Component Value Date/Time Cholesterol, total 159 03/17/2017 09:56 AM  
 HDL Cholesterol 68 (H) 03/17/2017 09:56 AM  
 LDL, calculated 75 03/17/2017 09:56 AM  
 Triglyceride 80 03/17/2017 09:56 AM  
 CHOL/HDL Ratio 2.3 03/17/2017 09:56 AM  
 
Lab Results Component Value Date/Time ALT (SGPT) 36 11/22/2016 05:46 PM  
 AST (SGOT) 24 11/22/2016 05:46 PM  
 Alk. phosphatase 183 (H) 11/22/2016 05:46 PM  
 Bilirubin, total 0.4 11/22/2016 05:46 PM  
 Albumin 4.0 11/22/2016 05:46 PM  
 Protein, total 8.2 11/22/2016 05:46 PM  
 INR 1.2 11/22/2016 05:46 PM  
 Prothrombin time 14.4 11/22/2016 05:46 PM  
 PLATELET 023 18/07/8256 01:57 AM  
 Hepatitis B surface Ag <0.10 03/25/2015 07:36 AM  
 
 
Lab Results Component Value Date/Time GFR est non-AA >60 11/24/2016 01:57 AM  
 GFR est AA >60 11/24/2016 01:57 AM  
 Creatinine 0.94 11/24/2016 01:57 AM  
 BUN 10 11/24/2016 01:57 AM  
 Sodium 138 11/24/2016 01:57 AM  
 Sodium,  01/01/2012 04:07 AM  
 Potassium 3.7 11/24/2016 01:57 AM  
 Potassium, POC 4.5 01/01/2012 04:07 AM  
 Chloride 104 11/24/2016 01:57 AM  
 CO2 28 11/24/2016 01:57 AM  
 Magnesium 2.2 03/17/2017 09:56 AM  
 Phosphorus 4.1 03/24/2012 02:00 AM  
  
 
ROS: Feeling generally well. No TIA's or unusual headaches, no dysphagia. No prolonged cough. No dyspnea or chest pain on exertion. No abdominal pain, change in bowel habits, black or bloody stools. No urinary tract symptoms.   Specific concerns today: under care of ortho for foot pain. Katherene Means Objective: The patient appears well, alert, oriented x 3, in no distress. Visit Vitals  /62 (BP 1 Location: Left arm, BP Patient Position: Sitting)  Pulse 70  Temp 99.2 °F (37.3 °C) (Oral)  Resp 16  
 Ht 5' 2\" (1.575 m)  Wt 268 lb (121.6 kg)  LMP 07/06/2018  SpO2 95%  BMI 49.02 kg/m2 ENT normal.  Neck supple. No adenopathy or thyromegaly. TOMASZ. Lungs are clear, good air entry, no wheezes, rhonchi or rales. S1 and S2 normal, no murmurs, regular rate and rhythm. Abdomen soft without tenderness, guarding, mass or organomegaly. Extremities aircast boot in place. Neurological is normal, no focal findings. Breast exam: breasts appear normal, no suspicious masses, no skin or nipple changes or axillary nodes. Assessment/Plan:  
Well Woman 
increase physical activity, follow low fat diet, follow low salt diet, continue present plan, routine labs ordered ICD-10-CM ICD-9-CM 1. Well woman exam (no gynecological exam) Z00.00 V70.0 [V70.0] 2. Screening for cardiovascular condition Z13.6 V81.2 3. Type 2 diabetes mellitus with hyperglycemia, without long-term current use of insulin ([facility]- for lab only E11.65 250.00 LIPID PANEL  
  298.47 METABOLIC PANEL, COMPREHENSIVE MICROALBUMIN, UR, RAND W/ MICROALB/CREAT RATIO  
   HEMOGLOBIN A1C WITH EAG 4. Other specified hearing loss of right ear, unspecified hearing status on contralateral side- new H91.8X1 389.8 REFERRAL TO ENT-OTOLARYNGOLOGY 5. Screening for breast cancer Z12.31 V76.10 GIANNI MAMMO BI SCREENING INCL CAD As above,  
above all stable unless otherwise noted 
 treatment plan as listed below Orders Placed This Encounter  GIANNI MAMMO BI SCREENING INCL CAD  LIPID PANEL  
 METABOLIC PANEL, COMPREHENSIVE  
 MICROALBUMIN, UR, RAND W/ MICROALB/CREAT RATIO  HEMOGLOBIN A1C WITH EAG  
 REFERRAL TO ENT-OTOLARYNGOLOGY  gabapentin (NEURONTIN) 300 mg capsule Follow-up Disposition: 
Return in about 4 months (around 11/30/2018) for dm. An After Visit Summary was printed and given to the patient. This has been fully explained to the patient, who indicates understanding.

## 2018-09-12 DIAGNOSIS — E78.5 DYSLIPIDEMIA: ICD-10-CM

## 2018-09-13 RX ORDER — PITAVASTATIN CALCIUM 4.18 MG/1
TABLET, FILM COATED ORAL
Qty: 90 TAB | Refills: 1 | Status: SHIPPED | OUTPATIENT
Start: 2018-09-13 | End: 2019-01-15 | Stop reason: SDUPTHER

## 2018-09-19 DIAGNOSIS — I10 HYPERTENSION, GOAL BELOW 140/80: ICD-10-CM

## 2018-09-19 RX ORDER — METOPROLOL TARTRATE 25 MG/1
TABLET, FILM COATED ORAL
Qty: 30 TAB | Refills: 3 | Status: SHIPPED | OUTPATIENT
Start: 2018-09-19 | End: 2019-01-15 | Stop reason: SDUPTHER

## 2018-11-07 ENCOUNTER — HOSPITAL ENCOUNTER (OUTPATIENT)
Dept: LAB | Age: 48
Discharge: HOME | End: 2018-11-07
Payer: COMMERCIAL

## 2018-11-07 DIAGNOSIS — E11.65 TYPE 2 DIABETES MELLITUS WITH HYPERGLYCEMIA, WITHOUT LONG-TERM CURRENT USE OF INSULIN (HCC): ICD-10-CM

## 2018-11-07 LAB
ALBUMIN SERPL-MCNC: 3.6 G/DL (ref 3.4–5)
ALBUMIN/GLOB SERPL: 1 {RATIO} (ref 0.8–1.7)
ALP SERPL-CCNC: 163 U/L (ref 45–117)
ALT SERPL-CCNC: 43 U/L (ref 13–56)
ANION GAP SERPL CALC-SCNC: 8 MMOL/L (ref 3–18)
AST SERPL-CCNC: 27 U/L (ref 15–37)
BILIRUB SERPL-MCNC: 0.3 MG/DL (ref 0.2–1)
BUN SERPL-MCNC: 17 MG/DL (ref 7–18)
BUN/CREAT SERPL: 16 (ref 12–20)
CALCIUM SERPL-MCNC: 8.6 MG/DL (ref 8.5–10.1)
CHLORIDE SERPL-SCNC: 107 MMOL/L (ref 100–108)
CHOLEST SERPL-MCNC: 156 MG/DL
CO2 SERPL-SCNC: 26 MMOL/L (ref 21–32)
CREAT SERPL-MCNC: 1.08 MG/DL (ref 0.6–1.3)
EST. AVERAGE GLUCOSE BLD GHB EST-MCNC: 123 MG/DL
GLOBULIN SER CALC-MCNC: 3.6 G/DL (ref 2–4)
GLUCOSE SERPL-MCNC: 109 MG/DL (ref 74–99)
HBA1C MFR BLD: 5.9 % (ref 4.2–5.6)
HDLC SERPL-MCNC: 51 MG/DL (ref 40–60)
HDLC SERPL: 3.1 {RATIO} (ref 0–5)
LDLC SERPL CALC-MCNC: 90.6 MG/DL (ref 0–100)
LIPID PROFILE,FLP: NORMAL
POTASSIUM SERPL-SCNC: 4.1 MMOL/L (ref 3.5–5.5)
PROT SERPL-MCNC: 7.2 G/DL (ref 6.4–8.2)
SODIUM SERPL-SCNC: 141 MMOL/L (ref 136–145)
TRIGL SERPL-MCNC: 72 MG/DL (ref ?–150)
VLDLC SERPL CALC-MCNC: 14.4 MG/DL

## 2018-11-07 PROCEDURE — 36415 COLL VENOUS BLD VENIPUNCTURE: CPT | Performed by: FAMILY MEDICINE

## 2018-11-07 PROCEDURE — 83036 HEMOGLOBIN GLYCOSYLATED A1C: CPT | Performed by: FAMILY MEDICINE

## 2018-11-07 PROCEDURE — 80053 COMPREHEN METABOLIC PANEL: CPT | Performed by: FAMILY MEDICINE

## 2018-11-07 PROCEDURE — 80061 LIPID PANEL: CPT | Performed by: FAMILY MEDICINE

## 2018-11-07 PROCEDURE — 82043 UR ALBUMIN QUANTITATIVE: CPT | Performed by: FAMILY MEDICINE

## 2018-11-08 LAB
CREAT UR-MCNC: 153 MG/DL (ref 30–125)
MICROALBUMIN UR-MCNC: 3.36 MG/DL (ref 0–3)
MICROALBUMIN/CREAT UR-RTO: 22 MG/G (ref 0–30)

## 2019-01-07 ENCOUNTER — TELEPHONE (OUTPATIENT)
Dept: FAMILY MEDICINE CLINIC | Age: 49
End: 2019-01-07

## 2019-01-07 NOTE — TELEPHONE ENCOUNTER
Pt needs a pre auth for the [employer]. Please Advise. [phone] (pre auth number)      Pt's work number if someone needs to contact her today.   [phone] (work number)

## 2019-01-15 DIAGNOSIS — E78.5 DYSLIPIDEMIA: ICD-10-CM

## 2019-01-15 DIAGNOSIS — I10 HYPERTENSION, GOAL BELOW 140/80: ICD-10-CM

## 2019-01-15 DIAGNOSIS — E11.9 TYPE 2 DIABETES MELLITUS WITHOUT COMPLICATION, WITHOUT LONG-TERM CURRENT USE OF INSULIN (HCC): ICD-10-CM

## 2019-01-15 DIAGNOSIS — I10 ESSENTIAL HYPERTENSION: ICD-10-CM

## 2019-01-15 RX ORDER — METOPROLOL TARTRATE 25 MG/1
25 TABLET, FILM COATED ORAL 2 TIMES DAILY
Qty: 30 TAB | Refills: 0 | Status: SHIPPED | OUTPATIENT
Start: 2019-01-15 | End: 2019-01-21

## 2019-01-15 RX ORDER — OLMESARTAN MEDOXOMIL AND HYDROCHLOROTHIAZIDE 20/12.5 20; 12.5 MG/1; MG/1
1 TABLET ORAL DAILY
Qty: 90 TAB | Refills: 0 | Status: SHIPPED | OUTPATIENT
Start: 2019-01-15 | End: 2019-02-21 | Stop reason: SDUPTHER

## 2019-01-15 NOTE — TELEPHONE ENCOUNTER
Medication refill has been approved.   Please advise patient she is due for a follow up visit on diabetes/HTN/HLD

## 2019-01-16 NOTE — TELEPHONE ENCOUNTER
Called and left message with patient requesting a call back. Patient needs to schedule appointment for follow up on diabetes/HTN/HLD.

## 2019-01-23 ENCOUNTER — TELEPHONE (OUTPATIENT)
Dept: FAMILY MEDICINE CLINIC | Age: 49
End: 2019-01-23

## 2019-02-12 ENCOUNTER — TELEPHONE (OUTPATIENT)
Dept: FAMILY MEDICINE CLINIC | Age: 49
End: 2019-02-12

## 2019-02-12 NOTE — TELEPHONE ENCOUNTER
Received denial letter from [employer]imit[employer] Martínez for [address], if PCP and patient are willing zetia is a formulary alternative that is covered under her plan that could be used.   Danna Lea LPN

## 2019-02-14 NOTE — TELEPHONE ENCOUNTER
Called and left message for patient to call back. Patient needs to make an appointment for medication change. Rk Arevalo LPN

## 2019-02-21 ENCOUNTER — OFFICE VISIT (OUTPATIENT)
Dept: FAMILY MEDICINE CLINIC | Age: 49
End: 2019-02-21

## 2019-02-21 VITALS
HEIGHT: 62 IN | DIASTOLIC BLOOD PRESSURE: 76 MMHG | TEMPERATURE: 98.2 F | SYSTOLIC BLOOD PRESSURE: 134 MMHG | BODY MASS INDEX: 50.97 KG/M2 | RESPIRATION RATE: 17 BRPM | OXYGEN SATURATION: 98 % | HEART RATE: 87 BPM | WEIGHT: 277 LBS

## 2019-02-21 DIAGNOSIS — I10 ESSENTIAL HYPERTENSION: ICD-10-CM

## 2019-02-21 DIAGNOSIS — I25.110 CORONARY ARTERY DISEASE INVOLVING NATIVE CORONARY ARTERY WITH UNSTABLE ANGINA PECTORIS (HCC): ICD-10-CM

## 2019-02-21 DIAGNOSIS — I25.10 CAD (CORONARY ARTERY DISEASE): ICD-10-CM

## 2019-02-21 DIAGNOSIS — E83.42 HYPOMAGNESEMIA: ICD-10-CM

## 2019-02-21 DIAGNOSIS — E11.29 TYPE 2 DIABETES MELLITUS WITH MICROALBUMINURIA, UNSPECIFIED WHETHER LONG TERM INSULIN USE (HCC): ICD-10-CM

## 2019-02-21 DIAGNOSIS — G47.62 NOCTURNAL LEG CRAMPS: ICD-10-CM

## 2019-02-21 DIAGNOSIS — R80.9 TYPE 2 DIABETES MELLITUS WITH MICROALBUMINURIA, UNSPECIFIED WHETHER LONG TERM INSULIN USE (HCC): ICD-10-CM

## 2019-02-21 DIAGNOSIS — R63.5 WEIGHT GAIN: ICD-10-CM

## 2019-02-21 DIAGNOSIS — E11.65 TYPE 2 DIABETES MELLITUS WITH HYPERGLYCEMIA, WITHOUT LONG-TERM CURRENT USE OF INSULIN (HCC): Primary | ICD-10-CM

## 2019-02-21 LAB — HBA1C MFR BLD HPLC: 6.4 %

## 2019-02-21 RX ORDER — DULOXETIN HYDROCHLORIDE 60 MG/1
60 CAPSULE, DELAYED RELEASE ORAL DAILY
COMMUNITY
End: 2019-02-21 | Stop reason: SDUPTHER

## 2019-02-21 RX ORDER — METOPROLOL TARTRATE 25 MG/1
12.5 TABLET, FILM COATED ORAL 2 TIMES DAILY
Qty: 180 TAB | Refills: 1 | Status: SHIPPED | OUTPATIENT
Start: 2019-02-21 | End: 2020-01-28 | Stop reason: SDUPTHER

## 2019-02-21 RX ORDER — DULOXETIN HYDROCHLORIDE 60 MG/1
60 CAPSULE, DELAYED RELEASE ORAL DAILY
Qty: 90 CAP | Refills: 1 | Status: SHIPPED | OUTPATIENT
Start: 2019-02-21

## 2019-02-21 RX ORDER — NITROGLYCERIN 0.4 MG/1
TABLET SUBLINGUAL
Qty: 1 BOTTLE | Refills: 3 | Status: SHIPPED | OUTPATIENT
Start: 2019-02-21 | End: 2019-11-20

## 2019-02-21 RX ORDER — LANOLIN ALCOHOL/MO/W.PET/CERES
400 CREAM (GRAM) TOPICAL DAILY
Qty: 90 TAB | Refills: 1 | Status: SHIPPED | OUTPATIENT
Start: 2019-02-21 | End: 2019-08-23 | Stop reason: SDUPTHER

## 2019-02-21 RX ORDER — ASPIRIN 81 MG/1
81 TABLET ORAL DAILY
Qty: 90 TAB | Refills: 3 | Status: SHIPPED | OUTPATIENT
Start: 2019-02-21 | End: 2020-04-30 | Stop reason: SDUPTHER

## 2019-02-21 RX ORDER — OLMESARTAN MEDOXOMIL AND HYDROCHLOROTHIAZIDE 20/12.5 20; 12.5 MG/1; MG/1
1 TABLET ORAL DAILY
Qty: 90 TAB | Refills: 1 | Status: SHIPPED | OUTPATIENT
Start: 2019-02-21 | End: 2019-10-21 | Stop reason: SDUPTHER

## 2019-02-21 NOTE — PROGRESS NOTES
SUBJECTIVE: 
47 y.o. female for follow up of diabetes. Diabetic Review of Systems - medication compliance: compliant all of the time, diabetic diet compliance: noncompliant most of the time reports uncertain what to eat, home glucose monitoring: not performing, further diabetic ROS: no polyuria or polydipsia, no chest pain, dyspnea or TIA's, no numbness, tingling or pain in extremities, last eye exam approximately due  acute symptoms are none. Other symptoms and concerns: unable to afford lovasa and taking fish oil 4000 mg daily. Wt Readings from Last 3 Encounters:  
02/21/19 277 lb (125.6 kg) 07/30/18 268 lb (121.6 kg) 03/15/18 265 lb (120.2 kg) Current Outpatient Medications Medication Sig Dispense Refill  magnesium oxide (MAG-OX) 400 mg tablet Take 1 Tab by mouth daily. 90 Tab 1  
 nitroglycerin (NITROSTAT) 0.4 mg SL tablet Place 1 tablet under tongue at the start of chest pain every 5 minutes up to 3 doses. 1 Bottle 3  
 aspirin delayed-release 81 mg tablet Take 1 Tab by mouth daily. 90 Tab 3  
 olmesartan-hydroCHLOROthiazide (BENICAR HCT) 20-12.5 mg per tablet Take 1 Tab by mouth daily. 90 Tab 1  
 metoprolol tartrate (LOPRESSOR) 25 mg tablet Take 0.5 Tabs by mouth two (2) times a day. 180 Tab 1  DULoxetine (CYMBALTA) 60 mg capsule Take 1 Cap by mouth daily. 90 Cap 1  
 gabapentin (NEURONTIN) 300 mg capsule Take 300 mg by mouth. Take 2 tablet by mouth in the morning, take 1 tablet by mouth at noon, take 1 tablet by mouth at 6:00PM, and take 2 tablets by mouth at bedtime.  multivitamin (ONE A DAY) tablet Take 1 Tab by mouth daily. 30 Tab 11  
 omega-3 acid ethyl esters (LOVAZA) 1 gram capsule Take 1 Cap by mouth nightly. 90 Cap 3 Wt Readings from Last 3 Encounters:  
02/21/19 277 lb (125.6 kg) 07/30/18 268 lb (121.6 kg) 03/15/18 265 lb (120.2 kg) BP Readings from Last 3 Encounters:  
02/21/19 134/76  
07/30/18 117/62  
03/15/18 136/80 PMH: reviewed medications and allergy lists and medical and family history. OBJECTIVE: 
Awake and alert in no acute distress Neck supple without lymphadenopathy, no carotid artery bruits auscultated bilaterally. No thyromegaly Lungs clear throughout S1 S2 RRR without ectopy or murmur auscultated. Extremities: no clubbing, cyanosis, peripheral edema Foot exam: monofilament no abnormalities, DP/PT pulses +2 bilaterally, ankle reflex +2 bilaterally Integumentary: no rashes Reviewed vital signs Visit Vitals /76 (BP 1 Location: Left arm, BP Patient Position: Sitting) Pulse 87 Temp 98.2 °F (36.8 °C) (Oral) Resp 17 Ht 5' 2\" (1.575 m) Wt 277 lb (125.6 kg) SpO2 98% BMI 50.66 kg/m² Results for orders placed or performed in visit on 02/21/19 AMB POC HEMOGLOBIN A1C Result Value Ref Range Hemoglobin A1c (POC) 6.4 % Diabetic foot exam:  
 
Left Foot: 
 Visual Exam: normal  
 Pulse DP: 2+ (normal) Filament test: normal sensation Vibratory sensation: normal 
   
Right Foot: 
 Visual Exam: normal  
 Pulse DP: 2+ (normal) Filament test: normal sensation Vibratory sensation: normal 
 
 
 
Diagnoses and all orders for this visit: 
 
Type 2 diabetes mellitus with hyperglycemia, without long-term current use of insulin ([facility]) 
-      DIABETES FOOT EXAM 
-     AMB POC HEMOGLOBIN A1C 
-      DIABETES EYE EXAM 
-     Cancel: HEMOGLOBIN A1C WITH EAG; Future 
-     REFERRAL TO OPHTHALMOLOGY 
-     olmesartan-hydroCHLOROthiazide (BENICAR HCT) 20-12.5 mg per tablet; Take 1 Tab by mouth daily. , Normal, Disp-90 Tab, R-1 
-     metoprolol tartrate (LOPRESSOR) 25 mg tablet; Take 0.5 Tabs by mouth two (2) times a day., NormalPlease consider 90 day supplies to promote better adherenceDisp-180 Tab, R-1 
-     REFERRAL TO PHYSICAL THERAPY Weight gain 
-     Cancel: HEMOGLOBIN A1C WITH EAG; Future 
-     REFERRAL TO OPHTHALMOLOGY 
-     REFERRAL TO PHYSICAL THERAPY BMI 50.0-59.9, adult (HCC) 
-     REFERRAL TO PHYSICAL THERAPY Hypomagnesemia 
-     magnesium oxide (MAG-OX) 400 mg tablet; Take 1 Tab by mouth daily. , NormalPlease consider 90 day supplies to promote better adherenceDisp-90 Tab, R-1 Nocturnal leg cramps 
-     magnesium oxide (MAG-OX) 400 mg tablet; Take 1 Tab by mouth daily. , NormalPlease consider 90 day supplies to promote better adherenceDisp-90 Tab, R-1 Coronary artery disease involving native coronary artery with unstable angina pectoris (HCC) 
-     nitroglycerin (NITROSTAT) 0.4 mg SL tablet; Place 1 tablet under tongue at the start of chest pain every 5 minutes up to 3 doses. , NormalNot a new order. Corrected label instructionsDisp-1 Bottle, R-3 
-     aspirin delayed-release 81 mg tablet; Take 1 Tab by mouth daily. , Normal, Disp-90 Tab, R-3 
 
CAD (coronary artery disease) Essential hypertension 
-     metoprolol tartrate (LOPRESSOR) 25 mg tablet; Take 0.5 Tabs by mouth two (2) times a day., NormalPlease consider 90 day supplies to promote better adherenceDisp-180 Tab, R-1 Type 2 diabetes mellitus with microalbuminuria, unspecified whether long term insulin use ([facility] Other orders -     DULoxetine (CYMBALTA) 60 mg capsule; Take 1 Cap by mouth daily. , Normal, Disp-90 Cap, R-1 I have reviewed/discussed the above normal BMI with the patient. I have recommended the following interventions: dietary management education, guidance, and counseling, encourage exercise, monitor weight and prescribed dietary intake . .   
I have discussed the diagnosis with the patient and the intended plan as seen in the above orders. The patient has received an after-visit summary and questions were answered concerning future plans. I have discussed medication side effects and warnings with the patient as well. Follow-up Disposition: 
Return in about 4 months (around 6/21/2019) for dm type 2.

## 2019-02-21 NOTE — PROGRESS NOTES
Chief Complaint Patient presents with  Diabetes 1. Have you been to the ER, urgent care clinic since your last visit? Hospitalized since your last visit? No 
 
2. Have you seen or consulted any other health care providers outside of the [address] [place] [address] since your last visit? Include any pap smears or colon screening.  No

## 2019-02-23 PROBLEM — E11.21 TYPE 2 DIABETES WITH NEPHROPATHY (HCC): Status: ACTIVE | Noted: 2019-02-23

## 2019-03-19 ENCOUNTER — OFFICE VISIT (OUTPATIENT)
Dept: CARDIOLOGY CLINIC | Age: 49
End: 2019-03-19

## 2019-03-19 VITALS
WEIGHT: 279.5 LBS | BODY MASS INDEX: 49.52 KG/M2 | OXYGEN SATURATION: 97 % | HEIGHT: 63 IN | HEART RATE: 67 BPM | DIASTOLIC BLOOD PRESSURE: 80 MMHG | SYSTOLIC BLOOD PRESSURE: 130 MMHG

## 2019-03-19 DIAGNOSIS — I24.9 ACS (ACUTE CORONARY SYNDROME) (HCC): Primary | ICD-10-CM

## 2019-03-19 NOTE — PROGRESS NOTES
HISTORY OF PRESENT ILLNESS  Lou Bacon is a 47 y.o. female. ASSESSMENT and PLAN    Ms. Paula Arias has history of CAD. She presented back in December 2011 with atypical chest pains but abnormal EKG. Her coronary angiogram revealed ostial 90-95% LAD stenosis. She subsequently underwent single vessel LIMA to LAD bypass surgery. Since her bypass surgery, she presented twice with chest pains to the emergency room which were again quite atypical. She had nuclear scans done both instances which were normal.  Back in November 2016, [facility] with [facility].  Nuclear scan showed ejection fraction of 60% but apical filling defect.  Ultimately, repeat coronary angiography was performed.  It was noted that her HECTOR to LAD was widely patent.  CAD:   Symptomatically stable.  BP:   Reasonably well controlled.  HR:    Stable.  CHF:   There is no evidence of decompensated CHF noted.  Weight:   Her weight today is 280 pounds. Her weight a year ago was 265 pounds. Her baseline is 250 pounds. I encouraged her for weight control; a lengthy discussion was carried on. She is hampered by left leg neuropathy.  Cholesterol:   Target LDL <70. Crestor 40.  Tobacco:   Denies.  Anti-platelet:   Remains on ASA. I will see her back in one year. Thank you. Encounter Diagnoses   Name Primary?  ACS (acute coronary syndrome) ([facility] Yes     current treatment plan is effective, no change in therapy  lab results and schedule of future lab studies reviewed with patient  reviewed diet, exercise and weight control  cardiovascular risk and specific lipid/LDL goals reviewed  use of aspirin to prevent MI and TIA's discussed        HPI  Today, Ms. Han Calvert has no complaints of chest pains, increased shortness of breath or decreased exercise capacity. She denies any orthopnea or PND. However, she is quite concerned about her continued weight gain.   She feels that she has difficulty losing weight because of left foot nerve damage. She is currently discussing weight control options with her PCP; she is scheduled to see a nutritionist later this month. Review of Systems   Respiratory: Negative for shortness of breath. Cardiovascular: Negative for chest pain, palpitations, orthopnea, claudication, leg swelling and PND. All other systems reviewed and are negative. Physical Exam   Constitutional: She is oriented to person, place, and time. She appears well-developed and well-nourished. HENT:   Head: Normocephalic. Eyes: Conjunctivae are normal.   Neck: Neck supple. No JVD present. Carotid bruit is not present. No thyromegaly present. Cardiovascular: Normal rate and regular rhythm. Pulmonary/Chest: Breath sounds normal.   Abdominal: Bowel sounds are normal.   Musculoskeletal: She exhibits no edema. Neurological: She is alert and oriented to person, place, and time. Skin: Skin is warm and dry. Nursing note and vitals reviewed. PCP: Cammy Osgood, NP    Past Medical History:   Diagnosis Date    Abnormal PFT 10/1/2015    Dr. Wiliam Schroeder, Pulmonology    Acid reflux     CABG x 1 12/31/2011    LIMA-LAD    CAD (coronary artery disease)     Cardiac cath 11/23/2016    R dom. oLAD 85%. Otherwise patent coronaries. HECTOR to LAD patent. LVEDP 22 mmHg. EF 55%. No RWMA.  Cardiac echocardiogram 07/28/2015    Tech difficult. EF 50-55%. No WMA. Mild conc LVH. Gr 2 DDfx. RVSP 45-50 mmHg. Mild LAE. Mild ROSITA. Mod TR. Mild IVCE.  Cardiac nuclear imaging test, high risk 11/23/2016    High risk. Mainly fixed anteroapical defect w/partial reversibility. EF 60%. No RWMA. Nondiagnostic EKG on pharm stress test.    Cardiovascular upper extremity arterial duplex 01/03/2014    No significant occlusive arterial disease bilaterally.     Coronary atherosclerosis of native coronary artery     Diabetes mellitus ([facility] 9/22/14    hgbA1C 6.8    Diabetic eye exam ([facility] 2016    Dyslipidemia     GERD (gastroesophageal reflux disease)     H/O screening mammography 2016    no evidence of malignancy    Hypercholesterolemia     Hypomagnesemia 14    1.4    Pleural effusion, left     s/p CABG and thoracentesis with removal of 900 mL    Pulmonary arterial hypertension [facility])        Past Surgical History:   Procedure Laterality Date    CARDIAC CATHETERIZATION  2016         CARDIAC SURG PROCEDURE UNLIST      CORONARY ARTERY ANGIOGRAM  2016         HX  SECTION      HX CORONARY ARTERY BYPASS GRAFT  12/31/2011    x1    LV ANGIOGRAPHY  2016            Current Outpatient Medications   Medication Sig Dispense Refill    magnesium oxide (MAG-OX) 400 mg tablet Take 1 Tab by mouth daily. 90 Tab 1    nitroglycerin (NITROSTAT) 0.4 mg SL tablet Place 1 tablet under tongue at the start of chest pain every 5 minutes up to 3 doses. 1 Bottle 3    aspirin delayed-release 81 mg tablet Take 1 Tab by mouth daily. 90 Tab 3    olmesartan-hydroCHLOROthiazide (BENICAR HCT) 20-12.5 mg per tablet Take 1 Tab by mouth daily. 90 Tab 1    metoprolol tartrate (LOPRESSOR) 25 mg tablet Take 0.5 Tabs by mouth two (2) times a day. 180 Tab 1    DULoxetine (CYMBALTA) 60 mg capsule Take 1 Cap by mouth daily. 90 Cap 1    gabapentin (NEURONTIN) 300 mg capsule Take 300 mg by mouth. Take 2 tablet by mouth in the morning, take 1 tablet by mouth at noon, take 1 tablet by mouth at 6:00PM, and take 2 tablets by mouth at bedtime.  multivitamin (ONE A DAY) tablet Take 1 Tab by mouth daily. 30 Tab 11    omega-3 acid ethyl esters (LOVAZA) 1 gram capsule Take 1 Cap by mouth nightly.  80 Cap 3       The patient has a family history of    Social History     Tobacco Use    Smoking status: Never Smoker    Smokeless tobacco: Never Used    Tobacco comment: 2nd home smoking from mother during 1st 17 yrs of life   Substance Use Topics    Alcohol use: No     Alcohol/week: 0.6 oz Types: 1 Standard drinks or equivalent per week    Drug use: No       Lab Results   Component Value Date/Time    Cholesterol, total 156 11/07/2018 08:08 AM    HDL Cholesterol 51 11/07/2018 08:08 AM    LDL, calculated 90.6 11/07/2018 08:08 AM    Triglyceride 72 11/07/2018 08:08 AM    CHOL/HDL Ratio 3.1 11/07/2018 08:08 AM        BP Readings from Last 3 Encounters:   03/19/19 130/80   02/21/19 134/76   07/30/18 117/62        Pulse Readings from Last 3 Encounters:   03/19/19 67   02/21/19 87   07/30/18 70       Wt Readings from Last 3 Encounters:   03/19/19 126.8 kg (279 lb 8 oz)   02/21/19 125.6 kg (277 lb)   07/30/18 121.6 kg (268 lb)         EKG: unchanged from previous tracings, normal sinus rhythm, lateral T wave inversion noted in leads I, aVL, V2 through V6.

## 2019-04-12 ENCOUNTER — OFFICE VISIT (OUTPATIENT)
Dept: FAMILY MEDICINE CLINIC | Age: 49
End: 2019-04-12

## 2019-04-12 VITALS
HEART RATE: 98 BPM | TEMPERATURE: 98.3 F | HEIGHT: 63 IN | BODY MASS INDEX: 49.08 KG/M2 | SYSTOLIC BLOOD PRESSURE: 132 MMHG | WEIGHT: 277 LBS | DIASTOLIC BLOOD PRESSURE: 68 MMHG | RESPIRATION RATE: 17 BRPM | OXYGEN SATURATION: 98 %

## 2019-04-12 DIAGNOSIS — B96.89 BACTERIAL CONJUNCTIVITIS OF BOTH EYES: Primary | ICD-10-CM

## 2019-04-12 DIAGNOSIS — H00.014 HORDEOLUM EXTERNUM LEFT UPPER EYELID: ICD-10-CM

## 2019-04-12 DIAGNOSIS — H10.9 BACTERIAL CONJUNCTIVITIS OF BOTH EYES: Primary | ICD-10-CM

## 2019-04-12 RX ORDER — ERYTHROMYCIN 5 MG/G
OINTMENT OPHTHALMIC
Qty: 3.5 G | Refills: 0 | Status: SHIPPED | OUTPATIENT
Start: 2019-04-12 | End: 2019-06-25 | Stop reason: ALTCHOICE

## 2019-04-12 NOTE — PATIENT INSTRUCTIONS
Pinkeye: Care Instructions  Your Care Instructions    Pinkeye is redness and swelling of the eye surface and the conjunctiva (the lining of the eyelid and the covering of the white part of the eye). Pinkeye is also called conjunctivitis. Pinkeye is often caused by infection with bacteria or a virus. Dry air, allergies, smoke, and chemicals are other common causes. Pinkeye often clears on its own in 7 to 10 days. Antibiotics only help if the pinkeye is caused by bacteria. Pinkeye caused by infection spreads easily. If an allergy or chemical is causing pinkeye, it will not go away unless you can avoid whatever is causing it. Follow-up care is a key part of your treatment and safety. Be sure to make and go to all appointments, and call your doctor if you are having problems. It's also a good idea to know your test results and keep a list of the medicines you take. How can you care for yourself at home? · Wash your hands often. Always wash them before and after you treat pinkeye or touch your eyes or face. · Use moist cotton or a clean, wet cloth to remove crust. Wipe from the inside corner of the eye to the outside. Use a clean part of the cloth for each wipe. · Put cold or warm wet cloths on your eye a few times a day if the eye hurts. · Do not wear contact lenses or eye makeup until the pinkeye is gone. Throw away any eye makeup you were using when you got pinkeye. Clean your contacts and storage case. If you wear disposable contacts, use a new pair when your eye has cleared and it is safe to wear contacts again. · If the doctor gave you antibiotic ointment or eyedrops, use them as directed. Use the medicine for as long as instructed, even if your eye starts looking better soon. Keep the bottle tip clean, and do not let it touch the eye area. · To put in eyedrops or ointment:  ? Tilt your head back, and pull your lower eyelid down with one finger. ?  Drop or squirt the medicine inside the lower lid.  ? Close your eye for 30 to 60 seconds to let the drops or ointment move around. ? Do not touch the ointment or dropper tip to your eyelashes or any other surface. · Do not share towels, pillows, or washcloths while you have pinkeye. When should you call for help? Call your doctor now or seek immediate medical care if:    · You have pain in your eye, not just irritation on the surface.     · You have a change in vision or loss of vision.     · You have an increase in discharge from the eye.     · Your eye has not started to improve or begins to get worse within 48 hours after you start using antibiotics.     · Pinkeye lasts longer than 7 days.    Watch closely for changes in your health, and be sure to contact your doctor if you have any problems. Where can you learn more? Go to http://yane-cooper.info/. Enter Y392 in the search box to learn more about \"Pinkeye: Care Instructions. \"  Current as of: September 23, 2018  Content Version: 11.9  © 4896-5551 [employer]. Care instructions adapted under license by [employer] (which disclaims liability or warranty for this information). If you have questions about a medical condition or this instruction, always ask your healthcare professional. [address] any warranty or liability for your use of this information. Styes and Chalazia: Care Instructions  Your Care Instructions    Styes and chalazia (say \"mym-JNZ-zqe-uh\") are both conditions that can cause swelling of the eyelid. A stye is an infection in the root of an eyelash. The infection causes a tender red lump on the edge of the eyelid. The infection can spread until the whole eyelid becomes red and inflamed. Styes usually break open, and a tiny amount of pus drains. They usually clear up on their own in about a week, but they sometimes need treatment with antibiotics.   A chalazion is a lump or cyst in the eyelid (chalazion is singular; chalazia is plural). It is caused by swelling and inflammation of deep oil glands inside the eyelid. Chalazia are usually not infected. They can take a few months to heal.  If a chalazion becomes more swollen and painful or does not go away, you may need to have it drained by your doctor. Follow-up care is a key part of your treatment and safety. Be sure to make and go to all appointments, and call your doctor if you are having problems. It's also a good idea to know your test results and keep a list of the medicines you take. How can you care for yourself at home? · Do not rub your eyes. Do not squeeze or try to open a stye or chalazion. · To help a stye or chalazion heal faster:  ? Put a warm, moist compress on your eye for 5 to 10 minutes, 3 to 6 times a day. Heat often brings a stye to a point where it drains on its own. Keep in mind that warm compresses will often increase swelling a little at first.  ? Do not use hot water or heat a wet cloth in a microwave oven. The compress may get too hot and can burn the eyelid. · Always wash your hands before and after you use a compress or touch your eyes. · If the doctor gave you antibiotic drops or ointment, use the medicine exactly as directed. Use the medicine for as long as instructed, even if your eye starts to feel better. · To put in eyedrops or ointment:  ? Tilt your head back, and pull your lower eyelid down with one finger. ? Drop or squirt the medicine inside the lower lid. ? Close your eye for 30 to 60 seconds to let the drops or ointment move around. ? Do not touch the ointment or dropper tip to your eyelashes or any other surface. · Do not wear eye makeup or contact lenses until the stye or chalazion heals. · Do not share towels, pillows, or washcloths while you have a stye. When should you call for help?   Call your doctor now or seek immediate medical care if:    · You have pain in your eye.     · You have a change in vision or loss of vision.     · Redness and swelling get much worse.    Watch closely for changes in your health, and be sure to contact your doctor if:    · Your stye does not get better in 1 week.     · Your chalazion does not start to get better after several weeks. Where can you learn more? Go to http://yane-cooper.info/. Enter Z343 in the search box to learn more about \"Styes and Chalazia: Care Instructions. \"  Current as of: July 17, 2018  Content Version: 11.9  © 7201-5211 [employer]. Care instructions adapted under license by [employer] (which disclaims liability or warranty for this information). If you have questions about a medical condition or this instruction, always ask your healthcare professional. [address] any warranty or liability for your use of this information.

## 2019-04-12 NOTE — PROGRESS NOTES
Subjective:   Leonardo Mitchell is a 47 y.o. female  With bilateral red eye and right eye draining yellow secretions particularly from outer canthus patient reported. She also has a tender lesion right upper eye lid. Duration several days. Accompanied by   Review of Systems   Constitutional: Negative. HENT: Negative. Eyes: Negative for blurred vision, double vision, photophobia and pain. Current Outpatient Medications   Medication Sig Dispense Refill    magnesium oxide (MAG-OX) 400 mg tablet Take 1 Tab by mouth daily. 90 Tab 1    nitroglycerin (NITROSTAT) 0.4 mg SL tablet Place 1 tablet under tongue at the start of chest pain every 5 minutes up to 3 doses. 1 Bottle 3    aspirin delayed-release 81 mg tablet Take 1 Tab by mouth daily. 90 Tab 3    olmesartan-hydroCHLOROthiazide (BENICAR HCT) 20-12.5 mg per tablet Take 1 Tab by mouth daily. 90 Tab 1    metoprolol tartrate (LOPRESSOR) 25 mg tablet Take 0.5 Tabs by mouth two (2) times a day. 180 Tab 1    DULoxetine (CYMBALTA) 60 mg capsule Take 1 Cap by mouth daily. 90 Cap 1    gabapentin (NEURONTIN) 300 mg capsule Take 300 mg by mouth. Take 2 tablet by mouth in the morning, take 1 tablet by mouth at noon, take 1 tablet by mouth at 6:00PM, and take 2 tablets by mouth at bedtime.  multivitamin (ONE A DAY) tablet Take 1 Tab by mouth daily. 30 Tab 11    omega-3 acid ethyl esters (LOVAZA) 1 gram capsule Take 1 Cap by mouth nightly. 90 Cap 3        Objective:   Awake and alert in no acute distress  Eyes: bilateral erythema conjunctivae, visible yellow light green eye discharge from right eye  Upper left eyelid with tender palpable hordeolum   No drainage from left eye  No regional lymphadenopathy  Lungs clear throughout  S1 S2 RRR without ectopy or murmur auscultated.   Integumentary: no rashes  Normal skin turgor  Visit Vitals  /68 (BP 1 Location: Left arm, BP Patient Position: Sitting)   Pulse 98   Temp 98.3 °F (36.8 °C) (Oral)   Resp 17   Ht 5' 3\" (1.6 m)   Wt 277 lb (125.6 kg)   LMP 03/14/2019   SpO2 98%   BMI 49.07 kg/m²      Diagnoses and all orders for this visit:    Bacterial conjunctivitis of both eyes  -     erythromycin (ILOTYCIN) ophthalmic ointment; Apply thin ribbon into both eyes every six hours for 7 days  Indications: Pink Eye from Bacterial Infection, Normal, Disp-3.5 g, R-0    Hordeolum externum left upper eyelid      Reviewed risks and benefits and common side effects of new medication  General comfort measures  Do not squeeze hordeolum left upper eye lid   Patient agrees with plan and verbalizes understanding. Work note   I have discussed the diagnosis with the patient and the intended plan as seen in the above orders. The patient has received an after-visit summary and questions were answered concerning future plans. I have discussed medication side effects and warnings with the patient as well. Follow-up and Dispositions    · Return if symptoms worsen or fail to improve.

## 2019-04-12 NOTE — PROGRESS NOTES
Chief Complaint   Patient presents with    Eye Problem     1. Have you been to the ER, urgent care clinic since your last visit? Hospitalized since your last visit?no     2. Have you seen or consulted any other health care providers outside of the [address] [place] [address] since your last visit? Include any pap smears or colon screening.  No

## 2019-04-12 NOTE — LETTER
NOTIFICATION RETURN TO WORK / SCHOOL 
 
4/12/2019 2:13 PM 
 
Ms. Mushtaq Alves [address] [place] [id number] To Whom It May Concern: 
 
[facility] She will return to work/school on: April 15, 2019 If there are questions or concerns please have the patient contact our office. Sincerely, Franklyn Sosa NP

## 2019-05-16 ENCOUNTER — HOSPITAL ENCOUNTER (OUTPATIENT)
Dept: MAMMOGRAPHY | Age: 49
Discharge: HOME | End: 2019-05-16
Attending: FAMILY MEDICINE
Payer: COMMERCIAL

## 2019-05-16 DIAGNOSIS — Z12.39 SCREENING FOR BREAST CANCER: ICD-10-CM

## 2019-05-16 PROCEDURE — 77067 SCR MAMMO BI INCL CAD: CPT

## 2019-06-25 ENCOUNTER — HOSPITAL ENCOUNTER (OUTPATIENT)
Dept: LAB | Age: 49
Discharge: HOME | End: 2019-06-25
Payer: COMMERCIAL

## 2019-06-25 ENCOUNTER — OFFICE VISIT (OUTPATIENT)
Dept: FAMILY MEDICINE CLINIC | Age: 49
End: 2019-06-25

## 2019-06-25 VITALS
HEIGHT: 63 IN | HEART RATE: 70 BPM | TEMPERATURE: 98.6 F | OXYGEN SATURATION: 99 % | DIASTOLIC BLOOD PRESSURE: 72 MMHG | RESPIRATION RATE: 17 BRPM | WEIGHT: 280.4 LBS | SYSTOLIC BLOOD PRESSURE: 136 MMHG | BODY MASS INDEX: 49.68 KG/M2

## 2019-06-25 DIAGNOSIS — R23.3 PETECHIAE: ICD-10-CM

## 2019-06-25 DIAGNOSIS — R23.3 PETECHIAE: Primary | ICD-10-CM

## 2019-06-25 LAB
ERYTHROCYTE [DISTWIDTH] IN BLOOD BY AUTOMATED COUNT: 15.6 % (ref 11.6–14.5)
HCT VFR BLD AUTO: 39.2 % (ref 35–45)
HGB BLD-MCNC: 12.5 G/DL (ref 12–16)
MCH RBC QN AUTO: 26.2 PG (ref 24–34)
MCHC RBC AUTO-ENTMCNC: 31.9 G/DL (ref 31–37)
MCV RBC AUTO: 82.2 FL (ref 74–97)
PLATELET # BLD AUTO: 181 K/UL (ref 135–420)
PMV BLD AUTO: 12.8 FL (ref 9.2–11.8)
RBC # BLD AUTO: 4.77 M/UL (ref 4.2–5.3)
WBC # BLD AUTO: 6.9 K/UL (ref 4.6–13.2)

## 2019-06-25 PROCEDURE — 36415 COLL VENOUS BLD VENIPUNCTURE: CPT

## 2019-06-25 PROCEDURE — 85027 COMPLETE CBC AUTOMATED: CPT

## 2019-06-25 NOTE — PROGRESS NOTES
Chief Complaint   Patient presents with    Rash     on right calf for the past week     1. Have you been to the ER, urgent care clinic since your last visit? Hospitalized since your last visit? No     2. Have you seen or consulted any other health care providers outside of the [address] [place] [address] since your last visit? Include any pap smears or colon screening.  No

## 2019-06-25 NOTE — PROGRESS NOTES
HISTORY OF PRESENT ILLNESS    Molly Mancini is a 47y.o. year old female here today for concern over a rash on right anterior shin noticed her  noticed one week ago. ROS: No pain reported no trauma or falls reported, denies itching      Current Outpatient Medications   Medication Sig Dispense Refill    magnesium oxide (MAG-OX) 400 mg tablet Take 1 Tab by mouth daily. 90 Tab 1    nitroglycerin (NITROSTAT) 0.4 mg SL tablet Place 1 tablet under tongue at the start of chest pain every 5 minutes up to 3 doses. 1 Bottle 3    aspirin delayed-release 81 mg tablet Take 1 Tab by mouth daily. 90 Tab 3    olmesartan-hydroCHLOROthiazide (BENICAR HCT) 20-12.5 mg per tablet Take 1 Tab by mouth daily. 90 Tab 1    metoprolol tartrate (LOPRESSOR) 25 mg tablet Take 0.5 Tabs by mouth two (2) times a day. 180 Tab 1    DULoxetine (CYMBALTA) 60 mg capsule Take 1 Cap by mouth daily. 90 Cap 1    gabapentin (NEURONTIN) 300 mg capsule Take 300 mg by mouth. Take 2 tablet by mouth in the morning, take 1 tablet by mouth at noon, take 1 tablet by mouth at 6:00PM, and take 2 tablets by mouth at bedtime.  multivitamin (ONE A DAY) tablet Take 1 Tab by mouth daily. 30 Tab 11    omega-3 acid ethyl esters (LOVAZA) 1 gram capsule Take 1 Cap by mouth nightly.  80 Cap 3     Patient Active Problem List   Diagnosis Code    CAD (coronary artery disease), native coronary artery I25.10    S/P CABG x 1 Z95.1    Obesity E66.9    Dyslipidemia E78.5    BMI 45.0-49.9, adult ([facility] Z68.42    Hypomagnesemia E83.42    Diabetes mellitus (HCC) E11.9    Gastroesophageal reflux disease without esophagitis K21.9    Hypertension, goal below 140/80 I10    ACS (acute coronary syndrome) (HCC) I24.9    Chest pain R07.9    Pulmonary arterial hypertension (HCC) I27.21    Abnormal PFT R94.2    Type 2 diabetes mellitus with diabetic neuropathy (HCC) E11.40    Type [facility] diabetes [facility] nephropathy [facility] E11.21     Reviewed past medical, family and social history    PMH: reviewed medications and allergy lists and medical and family history. OBJECTIVE:  Awake and alert in no acute distress  Lungs clear throughout  S1 S2 RRR without ectopy or murmur auscultated. Integumentary:  Left anterior shin region with petechia scattered on anterior mid shin region   Non tender no blanching of lesions  Overall dry skin   No rashes normal skin turgor  Visit Vitals  /72 (BP 1 Location: Right arm, BP Patient Position: Sitting)   Pulse 70   Temp 98.6 °F (37 °C) (Oral)   Resp 17   Ht 5' 3\" (1.6 m)   Wt 280 lb 6.4 oz (127.2 kg)   SpO2 99%   BMI 49.67 kg/m²     Diagnoses and all orders for this visit:    Petechiae  -     CBC W/O DIFF; Future      General comfort measures  I have discussed the diagnosis with the patient and the intended plan as seen in the above orders. The patient has received an after-visit summary and questions were answered concerning future plans. I have discussed medication side effects and warnings with the patient as well. Follow-up and Dispositions    · Return if symptoms worsen or fail to improve.

## 2019-06-27 NOTE — PROGRESS NOTES
Please call and let her know that her blood count was normal.  If the area on her left shin does not clear in a week call us back and I will send her to dermatology  [facility]

## 2019-06-28 ENCOUNTER — TELEPHONE (OUTPATIENT)
Dept: FAMILY MEDICINE CLINIC | Age: 49
End: 2019-06-28

## 2019-06-28 NOTE — TELEPHONE ENCOUNTER
----- Message from Kenny Wnyn NP sent at 6/27/2019  1:23 PM EDT -----  Please call and let her know that her blood count was normal.  If the area on her left shin does not clear in a week call us back and I will send her to dermatology  [address] [place] [address]

## 2019-08-06 ENCOUNTER — HOSPITAL ENCOUNTER (OUTPATIENT)
Dept: LAB | Age: 49
Discharge: HOME | End: 2019-08-06
Payer: COMMERCIAL

## 2019-08-06 ENCOUNTER — OFFICE VISIT (OUTPATIENT)
Dept: FAMILY MEDICINE CLINIC | Age: 49
End: 2019-08-06

## 2019-08-06 VITALS
HEIGHT: 63 IN | HEART RATE: 77 BPM | TEMPERATURE: 98.1 F | WEIGHT: 281 LBS | RESPIRATION RATE: 16 BRPM | DIASTOLIC BLOOD PRESSURE: 68 MMHG | SYSTOLIC BLOOD PRESSURE: 124 MMHG | BODY MASS INDEX: 49.79 KG/M2 | OXYGEN SATURATION: 97 %

## 2019-08-06 DIAGNOSIS — L73.9 FOLLICULITIS: ICD-10-CM

## 2019-08-06 DIAGNOSIS — E11.21 TYPE 2 DIABETES MELLITUS WITH DIABETIC NEPHROPATHY, UNSPECIFIED WHETHER LONG TERM INSULIN USE (HCC): ICD-10-CM

## 2019-08-06 DIAGNOSIS — Z12.39 BREAST CANCER SCREENING: ICD-10-CM

## 2019-08-06 DIAGNOSIS — I10 ESSENTIAL HYPERTENSION: Primary | ICD-10-CM

## 2019-08-06 DIAGNOSIS — E11.41 TYPE 2 DIABETES MELLITUS WITH DIABETIC MONONEUROPATHY, UNSPECIFIED WHETHER LONG TERM INSULIN USE (HCC): ICD-10-CM

## 2019-08-06 DIAGNOSIS — Z12.11 COLON CANCER SCREENING: ICD-10-CM

## 2019-08-06 DIAGNOSIS — L98.9 SKIN LESION OF RIGHT LEG: ICD-10-CM

## 2019-08-06 PROCEDURE — 36415 COLL VENOUS BLD VENIPUNCTURE: CPT

## 2019-08-06 PROCEDURE — 83036 HEMOGLOBIN GLYCOSYLATED A1C: CPT

## 2019-08-06 RX ORDER — AMOXICILLIN AND CLAVULANATE POTASSIUM 875; 125 MG/1; MG/1
1 TABLET, FILM COATED ORAL 2 TIMES DAILY
Qty: 20 TAB | Refills: 0 | Status: SHIPPED | OUTPATIENT
Start: 2019-08-06 | End: 2019-08-16

## 2019-08-06 NOTE — PROGRESS NOTES
Chief Complaint   Patient presents with    Rash     left and right lower leg      1. Have you been to the ER, urgent care clinic since your last visit? Hospitalized since your last visit? No    2. Have you seen or consulted any other health care providers outside of the [address] [place] [address] since your last visit? Include any pap smears or colon screening.  No

## 2019-08-06 NOTE — PROGRESS NOTES
SUBJECTIVE:  47 y.o. female for follow up of diabetes and accompanied by her . Diabetic Review of Systems - medication compliance: compliant all of the time, diabetic diet compliance: compliant most of the time, further diabetic ROS: no polyuria or polydipsia, no chest pain, dyspnea or TIA's, no numbness, tingling or pain in extremities, last eye exam approximately not due, acute symptoms are left axilla tender nodular lesion for several weeks. She does shave her axilla but not over the past two weeks. She denies any breast lumps and last mammogram 4- normal findings negative BIRADS. Other symptoms and concerns: recurring skin lesion right anterior shin no tenderness to palpation. Cardiology Arias: CAD history of CABG X1 LIMA-LAD 12-  3-   Abnormal PFT managed by [facility] pulmonary  Current Outpatient Medications   Medication Sig Dispense Refill    magnesium oxide (MAG-OX) 400 mg tablet Take 1 Tab by mouth daily. 90 Tab 1    nitroglycerin (NITROSTAT) 0.4 mg SL tablet Place 1 tablet under tongue at the start of chest pain every 5 minutes up to 3 doses. 1 Bottle 3    aspirin delayed-release 81 mg tablet Take 1 Tab by mouth daily. 90 Tab 3    olmesartan-hydroCHLOROthiazide (BENICAR HCT) 20-12.5 mg per tablet Take 1 Tab by mouth daily. 90 Tab 1    metoprolol tartrate (LOPRESSOR) 25 mg tablet Take 0.5 Tabs by mouth two (2) times a day. 180 Tab 1    DULoxetine (CYMBALTA) 60 mg capsule Take 1 Cap by mouth daily. 90 Cap 1    gabapentin (NEURONTIN) 300 mg capsule Take 300 mg by mouth. Take 2 tablet by mouth in the morning, take 1 tablet by mouth at noon, take 1 tablet by mouth at 6:00PM, and take 2 tablets by mouth at bedtime.  multivitamin (ONE A DAY) tablet Take 1 Tab by mouth daily. 30 Tab 11    omega-3 acid ethyl esters (LOVAZA) 1 gram capsule Take 1 Cap by mouth nightly.  90 Cap 3       Lab Results   Component Value Date/Time    Hemoglobin A1c 5.9 (H) 11/07/2018 08:08 AM Hemoglobin A1c (POC) 6.4 02/21/2019 01:26 PM     Wt Readings from Last 3 Encounters:   06/25/19 280 lb 6.4 oz (127.2 kg)   04/12/19 277 lb (125.6 kg)   03/19/19 279 lb 8 oz (126.8 kg)     PMH: reviewed medications and allergy lists and medical and family history. OBJECTIVE:  Awake and alert in no acute distress  Neck supple without lymphadenopathy, no carotid artery bruits auscultated bilaterally. No thyromegaly  Lungs clear throughout  S1 S2 RRR without ectopy or murmur auscultated. Extremities: no clubbing, cyanosis, peripheral edema  Integumentary: no rashes  Palpable nodular lesion left axilla surround hair follicles tender to palpation  Breasts: left breast normal without mass, skin or nipple changes or axillary nodes. Right anterior shin with skin lesion macular 3 cm in size   Reviewed vital signs   Visit Vitals  /68 (BP 1 Location: Left arm, BP Patient Position: Sitting)   Pulse 77   Temp 98.1 °F (36.7 °C) (Oral)   Resp 16   Ht 5' 3\" (1.6 m)   Wt 281 lb (127.5 kg)   SpO2 97%   BMI 49.78 kg/m²      Diagnoses and all orders for this visit:    Essential hypertension    BMI 45.0-49.9, adult (HCC)    Type 2 diabetes mellitus with diabetic nephropathy, unspecified whether long term insulin use ([facility])  -     HEMOGLOBIN A1C WITH EAG; Future    Type 2 diabetes mellitus with diabetic mononeuropathy, unspecified whether long term insulin use ([facility]  -     HEMOGLOBIN A1C WITH EAG; Future    Colon cancer screening  -     REFERRAL TO GASTROENTEROLOGY    Folliculitis  -     amoxicillin-clavulanate (AUGMENTIN) 875-125 mg per tablet; Take 1 Tab by mouth two (2) times a day for 10 days. , Normal, Disp-20 Tab, R-0    Skin lesion of right leg  -     REFERRAL TO DERMATOLOGY    Breast cancer screening  -     [facility] MAMMO BI SCREENING INCL CAD; Future    I have reviewed/discussed the above normal BMI with the patient.   I have recommended the following interventions: dietary management education, guidance, and counseling, encourage exercise, monitor weight and prescribed dietary intake . Maykel Cox Reviewed risks and benefits and common side effects of new medication  General comfort measures  Patient agrees with plan and verbalizes understanding. I have discussed the diagnosis with the patient and the intended plan as seen in the above orders. The patient has received an after-visit summary and questions were answered concerning future plans. I have discussed medication side effects and warnings with the patient as well. Follow-up and Dispositions    · Return in about 3 months (around 11/6/2019), or if symptoms worsen or fail to improve, for dm type 2, BMI.

## 2019-08-07 LAB
EST. AVERAGE GLUCOSE BLD GHB EST-MCNC: 143 MG/DL
HBA1C MFR BLD: 6.6 % (ref 4.2–5.6)

## 2019-08-23 DIAGNOSIS — E83.42 HYPOMAGNESEMIA: ICD-10-CM

## 2019-08-23 DIAGNOSIS — G47.62 NOCTURNAL LEG CRAMPS: ICD-10-CM

## 2019-08-23 RX ORDER — LANOLIN ALCOHOL/MO/W.PET/CERES
CREAM (GRAM) TOPICAL
Qty: 90 TAB | Refills: 1 | OUTPATIENT
Start: 2019-08-23 | End: 2020-02-12

## 2019-10-01 ENCOUNTER — OFFICE VISIT (OUTPATIENT)
Dept: FAMILY MEDICINE CLINIC | Age: 49
End: 2019-10-01

## 2019-10-01 ENCOUNTER — HOSPITAL ENCOUNTER (OUTPATIENT)
Dept: LAB | Age: 49
Discharge: HOME | End: 2019-10-01
Payer: COMMERCIAL

## 2019-10-01 VITALS
BODY MASS INDEX: 48.9 KG/M2 | WEIGHT: 276 LBS | TEMPERATURE: 98.1 F | HEIGHT: 63 IN | OXYGEN SATURATION: 99 % | HEART RATE: 67 BPM | RESPIRATION RATE: 17 BRPM | SYSTOLIC BLOOD PRESSURE: 132 MMHG | DIASTOLIC BLOOD PRESSURE: 76 MMHG

## 2019-10-01 DIAGNOSIS — Z23 ENCOUNTER FOR IMMUNIZATION: ICD-10-CM

## 2019-10-01 DIAGNOSIS — E11.21 TYPE 2 DIABETES MELLITUS WITH DIABETIC NEPHROPATHY, UNSPECIFIED WHETHER LONG TERM INSULIN USE (HCC): ICD-10-CM

## 2019-10-01 DIAGNOSIS — N64.4 MASTALGIA: ICD-10-CM

## 2019-10-01 DIAGNOSIS — E11.21 TYPE 2 DIABETES MELLITUS WITH DIABETIC NEPHROPATHY, UNSPECIFIED WHETHER LONG TERM INSULIN USE (HCC): Primary | ICD-10-CM

## 2019-10-01 PROCEDURE — 82043 UR ALBUMIN QUANTITATIVE: CPT

## 2019-10-01 NOTE — PATIENT INSTRUCTIONS
Body Mass Index: Care Instructions  Your Care Instructions    Body mass index (BMI) can help you see if your weight is raising your risk for health problems. It uses a formula to compare how much you weigh with how tall you are. · A BMI lower than 18.5 is considered underweight. · A BMI between 18.5 and 24.9 is considered healthy. · A BMI between 25 and 29.9 is considered overweight. A BMI of 30 or higher is considered obese. If your BMI is in the normal range, it means that you have a lower risk for weight-related health problems. If your BMI is in the overweight or obese range, you may be at increased risk for weight-related health problems, such as high blood pressure, heart disease, stroke, arthritis or joint pain, and diabetes. If your BMI is in the underweight range, you may be at increased risk for health problems such as fatigue, lower protection (immunity) against illness, muscle loss, bone loss, hair loss, and hormone problems. BMI is just one measure of your risk for weight-related health problems. You may be at higher risk for health problems if you are not active, you eat an unhealthy diet, or you drink too much alcohol or use tobacco products. Follow-up care is a key part of your treatment and safety. Be sure to make and go to all appointments, and call your doctor if you are having problems. It's also a good idea to know your test results and keep a list of the medicines you take. How can you care for yourself at home? · Practice healthy eating habits. This includes eating plenty of fruits, vegetables, whole grains, lean protein, and low-fat dairy. · If your doctor recommends it, get more exercise. Walking is a good choice. Bit by bit, increase the amount you walk every day. Try for at least 30 minutes on most days of the week. · Do not smoke. Smoking can increase your risk for health problems. If you need help quitting, talk to your doctor about stop-smoking programs and medicines. These can increase your chances of quitting for good. · Limit alcohol to 2 drinks a day for men and 1 drink a day for women. Too much alcohol can cause health problems. If you have a BMI higher than 25  · Your doctor may do other tests to check your risk for weight-related health problems. This may include measuring the distance around your waist. A waist measurement of more than 40 inches in men or 35 inches in women can increase the risk of weight-related health problems. · Talk with your doctor about steps you can take to stay healthy or improve your health. You may need to make lifestyle changes to lose weight and stay healthy, such as changing your diet and getting regular exercise. If you have a BMI lower than 18.5  · Your doctor may do other tests to check your risk for health problems. · Talk with your doctor about steps you can take to stay healthy or improve your health. You may need to make lifestyle changes to gain or maintain weight and stay healthy, such as getting more healthy foods in your diet and doing exercises to build muscle. Where can you learn more? Go to http://yane-cooper.info/. Enter S176 in the search box to learn more about \"Body Mass Index: Care Instructions. \"  Current as of: March 28, 2019  Content Version: 12.2  © 8232-0386 [employer]. Care instructions adapted under license by [employer] (which disclaims liability or warranty for this information). If you have questions about a medical condition or this instruction, always ask your healthcare professional. [address] any warranty or liability for your use of this information. Breast Pain: Care Instructions  Your Care Instructions    Breast tenderness and pain may come and go with your monthly periods (cyclic), or it may not follow any pattern (noncyclic). Breast pain is rarely caused by a serious health problem.  You may need tests to find the cause.  Follow-up care is a key part of your treatment and safety. Be sure to make and go to all appointments, and call your doctor if you are having problems. It's also a good idea to know your test results and keep a list of the medicines you take. How can you care for yourself at home? · If your doctor gave you medicine, take it exactly as prescribed. Call your doctor if you think you are having a problem with your medicine. · Take an over-the-counter pain medicine, such as acetaminophen (Tylenol), ibuprofen (Advil, Motrin), or naproxen (Aleve), to relieve pain and swelling. Read and follow all instructions on the label. · Do not take two or more pain medicines at the same time unless the doctor told you to. Many pain medicines have acetaminophen, which is Tylenol. Too much acetaminophen (Tylenol) can be harmful. · Wear a supportive bra, such as a sports bra or a jog bra. · Cut down on the amount of fat in your diet. If you need help planning healthy meals, see a dietitian. · Get at least 30 minutes of exercise on most days of the week. Walking is a good choice. You also may want to do other activities, such as running, swimming, cycling, or playing tennis or team sports. · Keep a healthy sleep pattern. Go to bed at the same time every night, and get up at the same time every day. When should you call for help? Call your doctor now or seek immediate medical care if:    · You have new changes in a breast, such as:  ? A lump or thickening in your breast or armpit. ? A change in the breast's size or shape. ? Skin changes, such as dimples or puckers. ? Nipple discharge. ? A change in the color or feel of the skin of your breast or the darker area around the nipple (areola). ? A change in the shape of the nipple (it may look like it's being pulled into the breast).     · You have symptoms of a breast infection, such as:  ? Increased pain, swelling, redness, or warmth around a breast.  ?  Red streaks extending from the breast.  ? Pus draining from a breast.  ? A fever.    Watch closely for changes in your health, and be sure to contact your doctor if:    · Your breast pain does not get better after 1 week.     · You have a lump or thickening in your breast or armpit. Where can you learn more? Go to http://yane-cooper.info/. Enter H457 in the search box to learn more about \"Breast Pain: Care Instructions. \"  Current as of: June 26, 2019  Content Version: 12.2  © 1138-8285 [employer]. Care instructions adapted under license by [employer] (which disclaims liability or warranty for this information). If you have questions about a medical condition or this instruction, always ask your healthcare professional. [address] any warranty or liability for your use of this information. Influenza (Flu) Vaccine (Inactivated or Recombinant): What You Need to Know  Why get vaccinated? Influenza (\"flu\") is a contagious disease that spreads around the [facility] every winter, usually between October and May. Flu is caused by influenza viruses and is spread mainly by coughing, sneezing, and close contact. Anyone can get flu. Flu strikes suddenly and can last several days. Symptoms vary by age, but can include:  · Fever/chills. · Sore throat. · Muscle aches. · Fatigue. · Cough. · Headache. · Runny or stuffy nose. Flu can also lead to pneumonia and blood infections, and cause diarrhea and seizures in children. If you have a medical condition, such as heart or lung disease, flu can make it worse. Flu is more dangerous for some people. Infants and young children, people 72years of age and older, pregnant women, and people with certain health conditions or a weakened immune system are at greatest risk. Each year thousands of people in the [facility] die from flu, and many more are hospitalized.   Flu vaccine can:  · Keep you from getting flu.  · Make flu less severe if you do get it. · Keep you from spreading flu to your family and other people. Inactivated and recombinant flu vaccines  A dose of flu vaccine is recommended every flu season. Children 6 months through 6years of age may need two doses during the same flu season. Everyone else needs only one dose each flu season. Some inactivated flu vaccines contain a very small amount of a mercury-based preservative called thimerosal. Studies have not shown thimerosal in vaccines to be harmful, but flu vaccines that do not contain thimerosal are available. There is no live flu virus in flu shots. They cannot cause the flu. There are many flu viruses, and they are always changing. Each year a new flu vaccine is made to protect against three or four viruses that are likely to cause disease in the upcoming flu season. But even when the vaccine doesn't exactly match these viruses, it may still provide some protection. Flu vaccine cannot prevent:  · Flu that is caused by a virus not covered by the vaccine. · Illnesses that look like flu but are not. Some people should not get this vaccine  Tell the person who is giving you the vaccine:  · If you have any severe (life-threatening) allergies. If you ever had a life-threatening allergic reaction after a dose of flu vaccine, or have a severe allergy to any part of this vaccine, you may be advised not to get vaccinated. Most, but not all, types of flu vaccine contain a small amount of egg protein. · If you ever had Guillain-Barré syndrome (also called GBS) Some people with a history of GBS should not get this vaccine. This should be discussed with your doctor. · If you are not feeling well. It is usually okay to get flu vaccine when you have a mild illness, but you might be asked to come back when you feel better. Risks of a vaccine reaction  With any medicine, including vaccines, there is a chance of reactions.  These are usually mild and go away on their own, but serious reactions are also possible. Most people who get a flu shot do not have any problems with it. Minor problems following a flu shot include:  · Soreness, redness, or swelling where the shot was given  · Hoarseness  · Sore, red or itchy eyes  · Cough  · Fever  · Aches  · Headache  · Itching  · Fatigue  If these problems occur, they usually begin soon after the shot and last 1 or 2 days. More serious problems following a flu shot can include the following:  · There may be a small increased risk of Guillain-Barré Syndrome (GBS) after inactivated flu vaccine. This risk has been estimated at 1 or 2 additional cases per million people vaccinated. This is much lower than the risk of severe complications from flu, which can be prevented by flu vaccine. · [facility] who get the flu shot along with pneumococcal vaccine (PCV13) and/or DTaP vaccine at the same time might be slightly more likely to have a seizure caused by fever. Ask your doctor for more information. Tell your doctor if a child who is getting flu vaccine has ever had a seizure  Problems that could happen after any injected vaccine:  · People sometimes faint after a medical procedure, including vaccination. Sitting or lying down for about 15 minutes can help prevent fainting, and injuries caused by a fall. Tell your doctor if you feel dizzy, or have vision changes or ringing in the ears. · Some people get severe pain in the shoulder and have difficulty moving the arm where a shot was given. This happens very rarely. · Any medication can cause a severe allergic reaction. Such reactions from a vaccine are very rare, estimated at about 1 in a million doses, and would happen within a few minutes to a few hours after the vaccination. As with any medicine, there is a very remote chance of a vaccine causing a serious injury or death. The safety of vaccines is always being monitored.  For more information, visit: www.cdc.gov/vaccinesafety/. What if there is a serious reaction? What should I look for? · Look for anything that concerns you, such as signs of a severe allergic reaction, very high fever, or unusual behavior. Signs of a severe allergic reaction can include hives, swelling of the face and throat, difficulty breathing, a fast heartbeat, dizziness, and weakness - usually within a few minutes to a few hours after the vaccination. What should I do? · If you think it is a severe allergic reaction or other emergency that can't wait, call 9-1-1 and get the person to the nearest hospital. Otherwise, call your doctor. · Reactions should be reported to the \"Vaccine Adverse Event Reporting System\" (VAERS). Your doctor should file this report, or you can do it yourself through the VAERS website at www.vaers. [employer].gov, or by calling 6-668.263.4490. VAERS does not give medical advice. The National Vaccine Injury Compensation Program  The National Vaccine Injury Compensation Program (VICP) is a federal program that was created to compensate people who may have been injured by certain vaccines. Persons who believe they may have been injured by a vaccine can learn about the program and about filing a claim by calling 4-803.197.5430 or visiting the [facility] [employer] website at www.[facility].gov/vaccinecompensation. There is a time limit to file a claim for compensation. How can I learn more? · Ask your healthcare provider. He or she can give you the vaccine package insert or suggest other sources of information. · Call your local or state health department. · Contact the Centers for Disease Control and Prevention (CDC):  ? Call 3-343.581.5017 (1-800-CDC-INFO) or  ? Visit CDC's website at www.cdc.gov/flu  Vaccine Information Statement  Inactivated Influenza Vaccine  8/7/2015)  [address] 027RU-07  Department of Health and Human Services  Centers for Disease Control and Prevention  Many Vaccine Information Statements are available in [place] and other languages. See www.immunize.org/vis. Muchas hojas de información sobre vacunas están disponibles en español y en otros idiomas. Visite www.immunize.org/vis. Care instructions adapted under license by [employer] (which disclaims liability or warranty for this information). If you have questions about a medical condition or this instruction, always ask your healthcare professional. [place] [address] any warranty or liability for your use of this information. Pneumococcal 13-Valent Vaccine, Diphtheria Conjugate (Prevnar 15) - (By injection)   Why this medicine is used:   Prevents infections, such as pneumonia and meningitis. Contact a nurse or doctor right away if you have:  · Itching or hives, swelling in your face or hands, swelling or tingling in your mouth or throat, chest tightness, trouble breathing  · High fever     Common side effects:  · Crying, irritability, or fussiness  · Joint or muscle pain  · Mild skin rash  · Pain, burning, redness, or swelling where the shot was given  · Poor appetite  · Sleep changes  © 2017 [facility] Information is for End User's use only and may not be sold, redistributed or otherwise used for commercial purposes. Pneumococcal 13-Valent Vaccine, Diphtheria Conjugate (By injection)   Pneumococcal 13-Valent Vaccine, Diphtheria Conjugate (STQ-bif-BLG-al 13-VAY-lent VAX-een, dif-THEER-ee-a IDM-lvx-nqiz)  Prevents infections, such as pneumonia and meningitis. Brand Name(s): Prevnar 13   There may be other brand names for this medicine. When This Medicine Should Not Be Used: This vaccine is not right for everyone. You should not receive it if you had an allergic reaction to pneumococcal or diphtheria vaccine. How to Use This Medicine:   Injectable  · A nurse or other health provider will give you this medicine. This vaccine is usually given as a shot into a muscle in the thigh or upper arm.   · The vaccine schedule is different for different people. ¨ Tell your doctor if your child was born prematurely. Children who were premature may need to follow a different schedule. ¨ Children younger than 10years of age: This vaccine is usually given as 3 or 4 separate shots over several months. Your child's doctor will tell you how many shots are needed and when to come back for the next one. ¨ Children older than 10years of age: This vaccine is given as a single shot. If your child recently received another pneumonia vaccine, this one should be given at least 8 weeks later. ¨ Adults older than 25years of age: This vaccine is given as a single dose. · It is very important for your child to receive all of the shots for the vaccine. · Missed dose: This vaccine must be given on a fixed schedule. If your child misses a dose, call your child's doctor for another appointment. Drugs and Foods to Avoid:   Ask your doctor or pharmacist before using any other medicine, including over-the-counter medicines, vitamins, and herbal products. · Some foods and medicines can affect how this vaccine works. Tell your doctor if you are receiving a treatment or medicine that causes a weak immune system. This includes radiation treatment, steroid medicine (including hydrocortisone, methylprednisolone, prednisolone, prednisone), or cancer medicine. Warnings While Using This Medicine:   · Tell your doctor if you are pregnant or breastfeeding. · Tell your doctor if you have a weak immune system. You may not be fully protected by this vaccine.   Possible Side Effects While Using This Medicine:   Call your doctor right away if you notice any of these side effects:  · Allergic reaction: Itching or hives, swelling in your face or hands, swelling or tingling in your mouth or throat, chest tightness, trouble breathing  · High fever  If you notice these less serious side effects, talk with your doctor:   · Crying, irritability, or fussiness  · Joint or muscle pain  · Mild skin rash  · Pain, burning, redness, or swelling where the shot was given  · Poor appetite  · Sleep changes  If you notice other side effects that you think are caused by this medicine, tell your doctor. Call your doctor for medical advice about side effects. You may report side effects to FDA at 2-731-JYH-0598  © 2017 [facility] Information is for End User's use only and may not be sold, redistributed or otherwise used for commercial purposes. The above information is an  only. It is not intended as medical advice for individual conditions or treatments. Talk to your doctor, nurse or pharmacist before following any medical regimen to see if it is safe and effective for you.

## 2019-10-01 NOTE — PROGRESS NOTES
Chief Complaint   Patient presents with    Diabetes     1. Have you been to the ER, urgent care clinic since your last visit? Hospitalized since your last visit? No    2. Have you seen or consulted any other health care providers outside of the [address] [place] [address] since your last visit? Include any pap smears or colon screening. No    SUBJECTIVE:  47 y.o. female for follow up of diabetes. Diabetic Review of Systems - medication compliance: compliant all of the time, diabetic diet compliance: noncompliant some of the time, home glucose monitoring: is not performed, further diabetic ROS: no polyuria or polydipsia, no chest pain, dyspnea or TIA's, no numbness, tingling or pain in extremities, last eye exam approximately not due  acute symptoms are has been experiencing right lateral breast pain for the past few months. She had her mammogram in May 2019 normal findings  She reports that she never heard from dermatology regarding the skin lesions on her calves but the skin lesions have resolved    Current Outpatient Medications   Medication Sig Dispense Refill    magnesium oxide (MAG-OX) 400 mg tablet TAKE 1 TABLET BY MOUTH ONCE DAILY 90 Tab 1    nitroglycerin (NITROSTAT) 0.4 mg SL tablet Place 1 tablet under tongue at the start of chest pain every 5 minutes up to 3 doses. 1 Bottle 3    aspirin delayed-release 81 mg tablet Take 1 Tab by mouth daily. 90 Tab 3    olmesartan-hydroCHLOROthiazide (BENICAR HCT) 20-12.5 mg per tablet Take 1 Tab by mouth daily. 90 Tab 1    metoprolol tartrate (LOPRESSOR) 25 mg tablet Take 0.5 Tabs by mouth two (2) times a day. 180 Tab 1    DULoxetine (CYMBALTA) 60 mg capsule Take 1 Cap by mouth daily. 90 Cap 1    gabapentin (NEURONTIN) 300 mg capsule Take 300 mg by mouth. Take 2 tablet by mouth in the morning, take 1 tablet by mouth at noon, take 1 tablet by mouth at 6:00PM, and take 2 tablets by mouth at bedtime.  multivitamin (ONE A DAY) tablet Take 1 Tab by mouth daily.  27 Tab 11    omega-3 acid ethyl esters (LOVAZA) 1 gram capsule Take 1 Cap by mouth nightly. 80 Cap 3   PMH: reviewed medications and allergy lists and medical and family history. OBJECTIVE:  Awake and alert in no acute distress  Neck supple without lymphadenopathy, no carotid artery bruits auscultated bilaterally. No thyromegaly  Lungs clear throughout  S1 S2 RRR without ectopy or murmur auscultated. Breasts: right breast normal without mass, skin or nipple changes or axillary nodes with tenderness to palpation lateral aspect right breast.  Extremities: no clubbing, cyanosis, peripheral edema  Foot exam: monofilament no abnormalities, DP/PT pulses +2 bilaterally, ankle reflex +2 bilaterally  Integumentary: no rashes  Reviewed vital signs   Visit Vitals  /76 (BP 1 Location: Right arm, BP Patient Position: Sitting)   Pulse 67   Temp 98.1 °F (36.7 °C) (Oral)   Resp 17   Ht 5' 3\" (1.6 m)   Wt 276 lb (125.2 kg)   SpO2 99%   BMI 48.89 kg/m²         Diagnoses and all orders for this visit:    Type 2 diabetes mellitus with diabetic nephropathy, unspecified whether long term insulin use (HCC)  -     MICROALBUMIN, UR, RAND W/ MICROALB/CREAT RATIO; Future    Mastalgia  -     GIANNI MAMMO RT DX INCL CAD; Future    Encounter for immunization  -     INFLUENZA VIRUS VAC QUAD,SPLIT,PRESV FREE SYRINGE IM  -     PNEUMOCOCCAL CONJ VACCINE 13 VALENT IM    BMI 45.0-49.9, adult ([facility] [address]    I have reviewed/discussed the above normal BMI with the patient. I have recommended the following interventions: dietary management education, guidance, and counseling, encourage exercise, monitor weight and prescribed dietary intake . [facility] comfort measures  Patient agrees with plan and verbalizes understanding. I have discussed the diagnosis with the patient and the intended plan as seen in the above orders. The patient has received an after-visit summary and questions were answered concerning future plans.   I have discussed medication side effects and warnings with the patient as well. Follow-up and Dispositions    · Return in about 4 months (around 2/1/2020), or if symptoms worsen or fail to improve, for dm type 2, BMI .

## 2019-10-02 LAB
CREAT UR-MCNC: 125 MG/DL (ref 30–125)
MICROALBUMIN UR-MCNC: 0.6 MG/DL (ref 0–3)
MICROALBUMIN/CREAT UR-RTO: 5 MG/G (ref 0–30)

## 2019-10-03 ENCOUNTER — HOSPITAL ENCOUNTER (OUTPATIENT)
Dept: ULTRASOUND IMAGING | Age: 49
Discharge: HOME | End: 2019-10-03
Attending: NURSE PRACTITIONER
Payer: COMMERCIAL

## 2019-10-03 ENCOUNTER — HOSPITAL ENCOUNTER (OUTPATIENT)
Dept: MAMMOGRAPHY | Age: 49
Discharge: HOME | End: 2019-10-03
Attending: NURSE PRACTITIONER
Payer: COMMERCIAL

## 2019-10-03 DIAGNOSIS — N64.4 MASTALGIA: ICD-10-CM

## 2019-10-03 DIAGNOSIS — N64.4 BREAST PAIN: ICD-10-CM

## 2019-10-03 PROCEDURE — 76642 ULTRASOUND BREAST LIMITED: CPT

## 2019-10-03 PROCEDURE — 77061 BREAST TOMOSYNTHESIS UNI: CPT

## 2019-10-03 NOTE — PROGRESS NOTES
conducted an initial consultation and Spiritual Assessment for Eda Merino, who is a 47 y.o.,female. Patient's Primary Language is: [place]. According to the patient's EMR Yarsani Affiliation is: [facility].     The reason the Patient came to the hospital is:   Patient Active Problem List    Diagnosis Date Noted    Type 2 diabetes with nephropathy ([facility] 02/23/2019    Type 2 diabetes mellitus with diabetic neuropathy ([facility] 08/04/2018    Pulmonary arterial hypertension ([facility]     Chest pain 11/23/2016    ACS (acute coronary syndrome) ([facility] 11/22/2016    Hypertension, goal below 140/80 09/26/2016    Gastroesophageal reflux disease without esophagitis 12/07/2015    Abnormal PFT 10/01/2015    Perceptive hearing loss, both sides 04/21/2015    Diabetes mellitus ([facility] 09/24/2014    Hypomagnesemia 08/18/2014    BMI 45.0-49.9, adult ([facility] 05/14/2014    Obesity 01/31/2012    Dyslipidemia 01/31/2012    CAD (coronary artery disease), native coronary artery 01/23/2012    S/P CABG x 1 01/23/2012        The  provided the following Interventions:  Initiated a relationship of care and support. Explored issues of simeon, belief, spirituality and Denominational/ritual needs while hospitalized. Listened empathically. Provided chaplaincy education. Provided information about Spiritual Care Services. Offered assurance of continued prayers on patient's behalf. Chart reviewed. The following outcomes where achieved:  Patient shared limited information about both their medical narrative and spiritual journey/beliefs. Patient processed feeling about current hospitalization. Patient expressed gratitude for 's visit. Assessment:  Patient does not have any Denominational/cultural needs that will affect patient's preferences in health care. There are no spiritual or Denominational issues which require intervention at this time.      Plan:  Chaplains will continue to follow and will provide pastoral care on an as needed/requested basis.  recommends bedside caregivers page  on duty if patient shows signs of acute spiritual or emotional distress. Chaplain Agustina MERCADO  [address] [facility]   [phone]

## 2019-10-21 DIAGNOSIS — E11.65 TYPE 2 DIABETES MELLITUS WITH HYPERGLYCEMIA, WITHOUT LONG-TERM CURRENT USE OF INSULIN (HCC): ICD-10-CM

## 2019-10-21 RX ORDER — OLMESARTAN MEDOXOMIL AND HYDROCHLOROTHIAZIDE 20/12.5 20; 12.5 MG/1; MG/1
TABLET ORAL
Qty: 90 TAB | Refills: 1 | Status: SHIPPED | OUTPATIENT
Start: 2019-10-21 | End: 2020-02-13

## 2019-11-20 ENCOUNTER — HOSPITAL ENCOUNTER (EMERGENCY)
Age: 49
Discharge: HOME | End: 2019-11-20
Attending: EMERGENCY MEDICINE
Payer: COMMERCIAL

## 2019-11-20 ENCOUNTER — APPOINTMENT (OUTPATIENT)
Dept: GENERAL RADIOLOGY | Age: 49
End: 2019-11-20
Attending: PHYSICIAN ASSISTANT
Payer: COMMERCIAL

## 2019-11-20 VITALS
DIASTOLIC BLOOD PRESSURE: 73 MMHG | RESPIRATION RATE: 16 BRPM | OXYGEN SATURATION: 98 % | TEMPERATURE: 98.2 F | SYSTOLIC BLOOD PRESSURE: 133 MMHG | HEART RATE: 58 BPM

## 2019-11-20 DIAGNOSIS — R07.9 CHEST PAIN, UNSPECIFIED TYPE: Primary | ICD-10-CM

## 2019-11-20 LAB
ALBUMIN SERPL-MCNC: 3.8 G/DL (ref 3.4–5)
ALBUMIN/GLOB SERPL: 1 {RATIO} (ref 0.8–1.7)
ALP SERPL-CCNC: 163 U/L (ref 45–117)
ALT SERPL-CCNC: 40 U/L (ref 13–56)
ANION GAP SERPL CALC-SCNC: 9 MMOL/L (ref 3–18)
AST SERPL-CCNC: 35 U/L (ref 10–38)
BASOPHILS # BLD: 0 K/UL (ref 0–0.1)
BASOPHILS NFR BLD: 0 % (ref 0–2)
BILIRUB SERPL-MCNC: 0.5 MG/DL (ref 0.2–1)
BUN SERPL-MCNC: 16 MG/DL (ref 7–18)
BUN/CREAT SERPL: 17 (ref 12–20)
CALCIUM SERPL-MCNC: 9.1 MG/DL (ref 8.5–10.1)
CHLORIDE SERPL-SCNC: 106 MMOL/L (ref 100–111)
CK MB CFR SERPL CALC: NORMAL % (ref 0–4)
CK MB SERPL-MCNC: <1 NG/ML (ref 5–25)
CK SERPL-CCNC: 175 U/L (ref 26–192)
CO2 SERPL-SCNC: 27 MMOL/L (ref 21–32)
CREAT SERPL-MCNC: 0.93 MG/DL (ref 0.6–1.3)
D DIMER PPP FEU-MCNC: 0.36 UG/ML(FEU)
DIFFERENTIAL METHOD BLD: ABNORMAL
EOSINOPHIL # BLD: 0.2 K/UL (ref 0–0.4)
EOSINOPHIL NFR BLD: 2 % (ref 0–5)
ERYTHROCYTE [DISTWIDTH] IN BLOOD BY AUTOMATED COUNT: 16.9 % (ref 11.6–14.5)
GLOBULIN SER CALC-MCNC: 4 G/DL (ref 2–4)
GLUCOSE SERPL-MCNC: 99 MG/DL (ref 74–99)
HCT VFR BLD AUTO: 41.2 % (ref 35–45)
HGB BLD-MCNC: 13.3 G/DL (ref 12–16)
LYMPHOCYTES # BLD: 3.1 K/UL (ref 0.9–3.6)
LYMPHOCYTES NFR BLD: 36 % (ref 21–52)
MCH RBC QN AUTO: 26.6 PG (ref 24–34)
MCHC RBC AUTO-ENTMCNC: 32.3 G/DL (ref 31–37)
MCV RBC AUTO: 82.4 FL (ref 74–97)
MONOCYTES # BLD: 0.5 K/UL (ref 0.05–1.2)
MONOCYTES NFR BLD: 6 % (ref 3–10)
NEUTS SEG # BLD: 4.8 K/UL (ref 1.8–8)
NEUTS SEG NFR BLD: 56 % (ref 40–73)
PLATELET # BLD AUTO: 152 K/UL (ref 135–420)
PMV BLD AUTO: 12.9 FL (ref 9.2–11.8)
POTASSIUM SERPL-SCNC: 3.6 MMOL/L (ref 3.5–5.5)
PROT SERPL-MCNC: 7.8 G/DL (ref 6.4–8.2)
RBC # BLD AUTO: 5 M/UL (ref 4.2–5.3)
SODIUM SERPL-SCNC: 142 MMOL/L (ref 136–145)
TROPONIN I SERPL-MCNC: <0.02 NG/ML (ref 0–0.04)
TROPONIN I SERPL-MCNC: <0.02 NG/ML (ref 0–0.04)
WBC # BLD AUTO: 8.7 K/UL (ref 4.6–13.2)

## 2019-11-20 PROCEDURE — 84484 ASSAY OF TROPONIN QUANT: CPT

## 2019-11-20 PROCEDURE — 99284 EMERGENCY DEPT VISIT MOD MDM: CPT

## 2019-11-20 PROCEDURE — 71046 X-RAY EXAM CHEST 2 VIEWS: CPT

## 2019-11-20 PROCEDURE — 80053 COMPREHEN METABOLIC PANEL: CPT

## 2019-11-20 PROCEDURE — 82550 ASSAY OF CK (CPK): CPT

## 2019-11-20 PROCEDURE — 93005 ELECTROCARDIOGRAM TRACING: CPT

## 2019-11-20 PROCEDURE — 85379 FIBRIN DEGRADATION QUANT: CPT

## 2019-11-20 PROCEDURE — 85025 COMPLETE CBC W/AUTO DIFF WBC: CPT

## 2019-11-20 NOTE — ED TRIAGE NOTES
Pt co chest and shoulder pain that started earlier today states  Also having discomfort pt also co left jaw  Discomfort

## 2019-11-20 NOTE — ED NOTES
I performed a brief history of the patient here in triage and I have determined that pt will need further treatment and evaluation from the main side ER physician or MLP. I have placed initial orders based on the history to help in expediting patients care.        FABY Siegel

## 2019-11-21 LAB
ATRIAL RATE: 61 BPM
CALCULATED P AXIS, ECG09: 55 DEGREES
CALCULATED R AXIS, ECG10: 74 DEGREES
CALCULATED T AXIS, ECG11: 118 DEGREES
DIAGNOSIS, 93000: NORMAL
P-R INTERVAL, ECG05: 164 MS
Q-T INTERVAL, ECG07: 396 MS
QRS DURATION, ECG06: 76 MS
QTC CALCULATION (BEZET), ECG08: 398 MS
VENTRICULAR RATE, ECG03: 61 BPM

## 2019-11-21 NOTE — DISCHARGE INSTRUCTIONS
Return for pain, fever not resolving with motrin or tylenol, shortness of breath, vomiting, decreased fluid intake, weakness, numbness, dizziness, or any change or concerns - or for admission as discussed/offered. Patient Education        Chest Pain: Care Instructions  Your Care Instructions    There are many things that can cause chest pain. Some are not serious and will get better on their own in a few days. But some kinds of chest pain need more testing and treatment. Your doctor may have recommended a follow-up visit in the next 8 to 12 hours. If you are not getting better, you may need more tests or treatment. Even though your doctor has released you, you still need to watch for any problems. The doctor carefully checked you, but sometimes problems can develop later. If you have new symptoms or if your symptoms do not get better, get medical care right away. If you have worse or different chest pain or pressure that lasts more than 5 minutes or you passed out (lost consciousness), call 911 or seek other emergency help right away. A medical visit is only one step in your treatment. Even if you feel better, you still need to do what your doctor recommends, such as going to all suggested follow-up appointments and taking medicines exactly as directed. This will help you recover and help prevent future problems. How can you care for yourself at home? · Rest until you feel better. · Take your medicine exactly as prescribed. Call your doctor if you think you are having a problem with your medicine. · Do not drive after taking a prescription pain medicine. When should you call for help? Call 911 if:    · You passed out (lost consciousness).     · You have severe difficulty breathing.     · You have symptoms of a heart attack. These may include:  ? Chest pain or pressure, or a strange feeling in your chest.  ? Sweating. ? Shortness of breath. ? Nausea or vomiting.   ? Pain, pressure, or a strange feeling in your back, neck, jaw, or upper belly or in one or both shoulders or arms. ? Lightheadedness or sudden weakness. ? A fast or irregular heartbeat. After you call 911, the  may tell you to chew 1 adult-strength or 2 to 4 low-dose aspirin. Wait for an ambulance. Do not try to drive yourself.    Call your doctor today if:    · You have any trouble breathing.     · Your chest pain gets worse.     · You are dizzy or lightheaded, or you feel like you may faint.     · You are not getting better as expected.     · You are having new or different chest pain. Where can you learn more? Go to http://yane-cooper.info/. Enter A120 in the search box to learn more about \"Chest Pain: Care Instructions. \"  Current as of: June 26, 2019  Content Version: 12.2  © 9813-1672 [employer]. Care instructions adapted under license by [employer] (which disclaims liability or warranty for this information). If you have questions about a medical condition or this instruction, always ask your healthcare professional. [place] [address] any warranty or liability for your use of this information.

## 2019-11-21 NOTE — ED NOTES
11:39 PM  11/20/19     Discharge instructions given to Ange Garcia (name) with verbalization of understanding. Patient accompanied by partner. Patient discharged with the following prescriptions none. Patient discharged to home (destination).       Darby Myers

## 2019-11-21 NOTE — ED PROVIDER NOTES
Pt c/o left sided chest pain, rad to left shoulder and jaw, w jaw tingling, starting about 2-3 hours pta. No fever, no cough. No nausea. No sob. No leg swelling or new leg pain. No weakness or numbness. No rash. Pain worse w movement. No meds taken for pain pta. Pain 10. H/o cabg , neg eval since, neg stress test last 2 yrs ago per pt. Dr Luz Palafox is cardiologist.            Past Medical History:   Diagnosis Date    Abnormal PFT 10/1/2015    Dr. Tye Schroeder, Pulmonology    Acid reflux     CABG x 1 2011    LIMA-LAD    CAD (coronary artery disease)     Cardiac cath 2016    R dom. oLAD 85%. Otherwise patent coronaries. HECTOR to LAD patent. LVEDP 22 mmHg. EF 55%. No RWMA.  Cardiac echocardiogram 2015    Tech difficult. EF 50-55%. No WMA. Mild conc LVH. Gr 2 DDfx. RVSP 45-50 mmHg. Mild LAE. Mild ROSITA. Mod TR. Mild IVCE.  Cardiac nuclear imaging test, high risk 2016    High risk. Mainly fixed anteroapical defect w/partial reversibility. EF 60%. No RWMA. Nondiagnostic EKG on pharm stress test.    Cardiovascular upper extremity arterial duplex 2014    No significant occlusive arterial disease bilaterally.     Coronary atherosclerosis of native coronary artery     Diabetes mellitus ([address] 14    hgbA1C 6.8    Diabetic eye exam ([address]     Dyslipidemia     GERD (gastroesophageal reflux disease)     H/O screening mammography 2016    no evidence of malignancy    Hypercholesterolemia     Hypomagnesemia 14    1.4    Pleural effusion, left     s/p CABG and thoracentesis with removal of 900 mL    Pulmonary arterial hypertension ([address]        Past Surgical History:   Procedure Laterality Date    CARDIAC CATHETERIZATION  2016         CARDIAC SURG PROCEDURE UNLIST      CORONARY ARTERY ANGIOGRAM  2016         HX  SECTION      HX CORONARY ARTERY BYPASS GRAFT  12/31/2011    x1    LV ANGIOGRAPHY  2016 Family History:   Problem Relation Age of Onset    Diabetes Mother     Hypertension Father     Alzheimer Father 61       Social History     Socioeconomic History    Marital status:      Spouse name: Not on file    Number of children: Not on file    Years of education: Not on file    Highest education level: Not on file   Occupational History    Not on file   Social Needs    Financial resource strain: Not on file    Food insecurity:     Worry: Not on file     Inability: Not on file    Transportation needs:     Medical: Not on file     Non-medical: Not on file   Tobacco Use    Smoking status: Never Smoker    Smokeless tobacco: Never Used    Tobacco comment: 2nd home smoking from mother during 1st 13 yrs of life   Substance and Sexual Activity    Alcohol use: No     Alcohol/week: 1.0 standard drinks     Types: 1 Standard drinks or equivalent per week    Drug use: No    Sexual activity: Yes     Partners: Male     Birth control/protection: None   Lifestyle    Physical activity:     Days per week: Not on file     Minutes per session: Not on file    Stress: Not on file   Relationships    Social connections:     Talks on phone: Not on file     Gets together: Not on file     Attends [employer] service: Not on file     Active member of club or organization: Not on file     Attends meetings of clubs or organizations: Not on file     Relationship status: Not on file    Intimate partner violence:     Fear of current or ex partner: Not on file     Emotionally abused: Not on file     Physically abused: Not on file     Forced sexual activity: Not on file   Other Topics Concern     Service Not Asked    Blood Transfusions Not Asked    Caffeine Concern No    Occupational Exposure Not Asked   [place] Hazards Not Asked    Sleep Concern Not Asked    Stress Concern Not Asked    Weight Concern Not Asked    Special Diet Not Asked    Back Care Not Asked    Exercise Yes     Comment: stretching and ride bike twice a week    Bike Helmet Not Asked    Seat Belt Yes    Self-Exams Not Asked   Social History Narrative    Not on file         ALLERGIES: Contrave [naltrexone-bupropion] and Metformin    Review of Systems   Constitutional: Negative for fever. HENT: Negative for congestion. Respiratory: Negative for cough and shortness of breath. Cardiovascular: Positive for chest pain. Gastrointestinal: Negative for abdominal pain and vomiting. Musculoskeletal: Negative for back pain. Skin: Negative for rash. Neurological: Negative for light-headedness. All other systems reviewed and are negative. Vitals:    11/20/19 2045 11/20/19 2130 11/20/19 2300   BP: 124/74 120/75 133/73   Pulse: (!) 54 (!) 54 (!) 58   Resp: 18 18 16   Temp:   98.2 °F (36.8 °C)   SpO2: 97% 96% 98%            Physical Exam  Vitals signs and nursing note reviewed. Constitutional:       Appearance: She is well-developed. She is not diaphoretic. HENT:      Head: Normocephalic and atraumatic. Eyes:      Pupils: Pupils are equal, round, and reactive to light. Neck:      Musculoskeletal: Normal range of motion. Cardiovascular:      Rate and Rhythm: Normal rate and regular rhythm. Heart sounds: No murmur. Pulmonary:      Effort: Pulmonary effort is normal.      Breath sounds: No wheezing. Chest:      Chest wall: Tenderness (+ left sided chest pain) present. Abdominal:      Palpations: Abdomen is soft. Tenderness: There is no tenderness. Musculoskeletal:         General: No tenderness. Skin:     General: Skin is dry. Capillary Refill: Capillary refill takes less than 2 seconds. Findings: No rash. Neurological:      Mental Status: She is alert and oriented to person, place, and time.           MDM       Procedures      Vitals:  Patient Vitals for the past 12 hrs:   Temp Pulse Resp BP SpO2   11/20/19 2300 98.2 °F (36.8 °C) (!) 58 16 133/73 98 %   11/20/19 2130  (!) 54 18 120/75 96 % 11/20/19 2045  (!) 54 18 124/74 97 %         Medications ordered:   Medications - No data to display      Lab findings:  Recent Results (from the past 12 hour(s))   EKG, 12 LEAD, INITIAL    Collection Time: 11/20/19  6:32 PM   Result Value Ref Range    Ventricular Rate 61 BPM    Atrial Rate 61 BPM    P-R Interval 164 ms    QRS Duration 76 ms    Q-T Interval 396 ms    QTC Calculation (Bezet) 398 ms    Calculated P Axis 55 degrees    Calculated R Axis 74 degrees    Calculated T Axis 118 degrees    Diagnosis       Normal sinus rhythm  Possible Left atrial enlargement  Nonspecific ST and T wave abnormality  Abnormal ECG  When compared with ECG of 24-NOV-2016 09:41,  T wave inversion no longer evident in Inferior leads  T wave inversion less evident in Anterolateral leads     CBC WITH AUTOMATED DIFF    Collection Time: 11/20/19  6:42 PM   Result Value Ref Range    WBC 8.7 4.6 - 13.2 K/uL    RBC 5.00 4.20 - 5.30 M/uL    HGB 13.3 12.0 - 16.0 g/dL    HCT 41.2 35.0 - 45.0 %    MCV 82.4 74.0 - 97.0 FL    MCH 26.6 24.0 - 34.0 PG    MCHC 32.3 31.0 - 37.0 g/dL    RDW 16.9 (H) 11.6 - 14.5 %    PLATELET 725 128 - 915 K/uL    MPV 12.9 (H) 9.2 - 11.8 FL    NEUTROPHILS 56 40 - 73 %    LYMPHOCYTES 36 21 - 52 %    MONOCYTES 6 3 - 10 %    EOSINOPHILS 2 0 - 5 %    BASOPHILS 0 0 - 2 %    ABS. NEUTROPHILS 4.8 1.8 - 8.0 K/UL    ABS. LYMPHOCYTES 3.1 0.9 - 3.6 K/UL    ABS. MONOCYTES 0.5 0.05 - 1.2 K/UL    ABS. EOSINOPHILS 0.2 0.0 - 0.4 K/UL    ABS.  BASOPHILS 0.0 0.0 - 0.1 K/UL    DF AUTOMATED     METABOLIC PANEL, COMPREHENSIVE    Collection Time: 11/20/19  6:42 PM   Result Value Ref Range    Sodium 142 136 - 145 mmol/L    Potassium 3.6 3.5 - 5.5 mmol/L    Chloride 106 100 - 111 mmol/L    CO2 27 21 - 32 mmol/L    Anion gap 9 3.0 - 18 mmol/L    Glucose 99 74 - 99 mg/dL    BUN 16 7.0 - 18 MG/DL    Creatinine 0.93 0.6 - 1.3 MG/DL    BUN/Creatinine ratio 17 12 - 20      GFR est AA >60 >60 ml/min/1.73m2    GFR est non-AA >60 >60 ml/min/1.73m2 Calcium 9.1 8.5 - 10.1 MG/DL    Bilirubin, total 0.5 0.2 - 1.0 MG/DL    ALT (SGPT) 40 13 - 56 U/L    AST (SGOT) 35 10 - 38 U/L    Alk. phosphatase 163 (H) 45 - 117 U/L    Protein, total 7.8 6.4 - 8.2 g/dL    Albumin 3.8 3.4 - 5.0 g/dL    Globulin 4.0 2.0 - 4.0 g/dL    A-G Ratio 1.0 0.8 - 1.7     TROPONIN I    Collection Time: 11/20/19  6:42 PM   Result Value Ref Range    Troponin-I, QT <0.02 0.0 - 0.045 NG/ML   D DIMER    Collection Time: 11/20/19  6:42 PM   Result Value Ref Range    D DIMER 0.36 <0.46 ug/ml(FEU)   CARDIAC PANEL,(CK, CKMB & TROPONIN)    Collection Time: 11/20/19  9:50 PM   Result Value Ref Range     26 - 192 U/L    CK - MB <1.0 <3.6 ng/ml    CK-MB Index  0.0 - 4.0 %     CALCULATION NOT PERFORMED WHEN RESULT IS BELOW LINEAR LIMIT    Troponin-I, QT <0.02 0.0 - 0.045 NG/ML           X-Ray, CT or other radiology findings or impressions:  XR CHEST PA LAT    (Results Pending)       Progress notes, Consult notes or additional Procedure notes:   11:31 PM pt declines admit as offered or further ed eval or tx, req dc. Atypical cp, no acute ekg changes, neg ck/trop x 2. But pt w sig history of cad, so offered admit, but to dc per pt req. Detailed return inst given. Pt markedly improved after initial eval, pain resolved. Urged pt to ret for any return of symptoms, or see pcp to arrange outpt stress testing. Diagnosis:   1.  Chest pain, unspecified type        Disposition: home    Follow-up Information     Follow up With Specialties Details Why Contact Info    Roman Crowe NP Nurse Practitioner Schedule an appointment as soon as possible for a visit in 1 day  [facility] [address]      Benjy Bergman MD Cardiology Schedule an appointment as soon as possible for a visit in 1 day  [address]             Patient's Medications   Start Taking    No medications on file   Continue Taking    ASPIRIN DELAYED-RELEASE 81 MG TABLET    Take 1 Tab by mouth daily. DULOXETINE (CYMBALTA) 60 MG CAPSULE    Take 1 Cap by mouth daily. GABAPENTIN (NEURONTIN) 300 MG CAPSULE    Take 300 mg by mouth. Take 2 tablet by mouth in the morning, take 1 tablet by mouth at noon, take 1 tablet by mouth at 6:00PM, and take 2 tablets by mouth at bedtime. MAGNESIUM OXIDE (MAG-OX) 400 MG TABLET    TAKE 1 TABLET BY MOUTH ONCE DAILY    METOPROLOL TARTRATE (LOPRESSOR) 25 MG TABLET    Take 0.5 Tabs by mouth two (2) times a day. MULTIVITAMIN (ONE A DAY) TABLET    Take 1 Tab by mouth daily. OLMESARTAN-HYDROCHLOROTHIAZIDE (BENICAR HCT) 20-12.5 MG PER TABLET    TAKE 1 TABLET BY MOUTH ONCE DAILY    OMEGA-3 ACID ETHYL ESTERS (LOVAZA) 1 GRAM CAPSULE    Take 1 Cap by mouth nightly. These Medications have changed    No medications on file   Stop Taking    NITROGLYCERIN (NITROSTAT) 0.4 MG SL TABLET    Place 1 tablet under tongue at the start of chest pain every 5 minutes up to 3 doses.

## 2019-11-21 NOTE — ED NOTES
Patient c/o pressure pain to left anterior chest and numbness to left jaw that radiates down left shoulder and into left arm. Updated on status, position of comfort. Updated on lab values and probable plan of care.

## 2020-01-28 ENCOUNTER — OFFICE VISIT (OUTPATIENT)
Dept: FAMILY MEDICINE CLINIC | Age: 50
End: 2020-01-28

## 2020-01-28 ENCOUNTER — HOSPITAL ENCOUNTER (OUTPATIENT)
Dept: LAB | Age: 50
Discharge: HOME | End: 2020-01-28
Payer: COMMERCIAL

## 2020-01-28 ENCOUNTER — TELEPHONE (OUTPATIENT)
Dept: FAMILY MEDICINE CLINIC | Age: 50
End: 2020-01-28

## 2020-01-28 VITALS
HEIGHT: 63 IN | BODY MASS INDEX: 48.2 KG/M2 | SYSTOLIC BLOOD PRESSURE: 130 MMHG | HEART RATE: 72 BPM | RESPIRATION RATE: 17 BRPM | TEMPERATURE: 98.1 F | DIASTOLIC BLOOD PRESSURE: 72 MMHG | WEIGHT: 272 LBS | OXYGEN SATURATION: 98 %

## 2020-01-28 DIAGNOSIS — E11.65 TYPE 2 DIABETES MELLITUS WITH HYPERGLYCEMIA, WITHOUT LONG-TERM CURRENT USE OF INSULIN (HCC): Primary | ICD-10-CM

## 2020-01-28 DIAGNOSIS — I10 ESSENTIAL HYPERTENSION: ICD-10-CM

## 2020-01-28 DIAGNOSIS — Z95.1 S/P CABG X 1: ICD-10-CM

## 2020-01-28 DIAGNOSIS — R94.31 EKG ABNORMALITIES: ICD-10-CM

## 2020-01-28 DIAGNOSIS — I25.119 CORONARY ARTERY DISEASE INVOLVING NATIVE CORONARY ARTERY OF NATIVE HEART WITH ANGINA PECTORIS (HCC): ICD-10-CM

## 2020-01-28 DIAGNOSIS — E78.5 DYSLIPIDEMIA: ICD-10-CM

## 2020-01-28 DIAGNOSIS — Z87.898 HISTORY OF CHEST PAIN: ICD-10-CM

## 2020-01-28 DIAGNOSIS — E11.65 TYPE 2 DIABETES MELLITUS WITH HYPERGLYCEMIA, WITHOUT LONG-TERM CURRENT USE OF INSULIN (HCC): ICD-10-CM

## 2020-01-28 LAB
ALBUMIN SERPL-MCNC: 3.7 G/DL (ref 3.4–5)
ALBUMIN/GLOB SERPL: 1 {RATIO} (ref 0.8–1.7)
ALP SERPL-CCNC: 170 U/L (ref 45–117)
ALT SERPL-CCNC: 55 U/L (ref 13–56)
ANION GAP SERPL CALC-SCNC: 4 MMOL/L (ref 3–18)
AST SERPL-CCNC: 40 U/L (ref 10–38)
BILIRUB SERPL-MCNC: 0.4 MG/DL (ref 0.2–1)
BUN SERPL-MCNC: 15 MG/DL (ref 7–18)
BUN/CREAT SERPL: 15 (ref 12–20)
CALCIUM SERPL-MCNC: 9.4 MG/DL (ref 8.5–10.1)
CHLORIDE SERPL-SCNC: 108 MMOL/L (ref 100–111)
CHOLEST SERPL-MCNC: 261 MG/DL
CO2 SERPL-SCNC: 30 MMOL/L (ref 21–32)
CREAT SERPL-MCNC: 0.97 MG/DL (ref 0.6–1.3)
EST. AVERAGE GLUCOSE BLD GHB EST-MCNC: 131 MG/DL
GLOBULIN SER CALC-MCNC: 3.8 G/DL (ref 2–4)
GLUCOSE SERPL-MCNC: 106 MG/DL (ref 74–99)
HBA1C MFR BLD: 6.2 % (ref 4.2–5.6)
HDLC SERPL-MCNC: 60 MG/DL (ref 40–60)
HDLC SERPL: 4.4 {RATIO} (ref 0–5)
LDLC SERPL CALC-MCNC: 180.6 MG/DL (ref 0–100)
LIPID PROFILE,FLP: ABNORMAL
POTASSIUM SERPL-SCNC: 3.9 MMOL/L (ref 3.5–5.5)
PROT SERPL-MCNC: 7.5 G/DL (ref 6.4–8.2)
SODIUM SERPL-SCNC: 142 MMOL/L (ref 136–145)
TRIGL SERPL-MCNC: 102 MG/DL (ref ?–150)
VLDLC SERPL CALC-MCNC: 20.4 MG/DL

## 2020-01-28 PROCEDURE — 80061 LIPID PANEL: CPT

## 2020-01-28 PROCEDURE — 36415 COLL VENOUS BLD VENIPUNCTURE: CPT

## 2020-01-28 PROCEDURE — 83036 HEMOGLOBIN GLYCOSYLATED A1C: CPT

## 2020-01-28 PROCEDURE — 80053 COMPREHEN METABOLIC PANEL: CPT

## 2020-01-28 RX ORDER — NITROGLYCERIN 0.4 MG/1
TABLET SUBLINGUAL
COMMUNITY
Start: 2019-11-21 | End: 2021-12-22 | Stop reason: SDUPTHER

## 2020-01-28 RX ORDER — LIDOCAINE 50 MG/G
PATCH TOPICAL
COMMUNITY
Start: 2020-01-25

## 2020-01-28 RX ORDER — METOPROLOL TARTRATE 25 MG/1
12.5 TABLET, FILM COATED ORAL 2 TIMES DAILY
Qty: 180 TAB | Refills: 1 | OUTPATIENT
Start: 2020-01-28 | End: 2020-02-12

## 2020-01-28 NOTE — PATIENT INSTRUCTIONS
Body Mass Index: Care Instructions Your Care Instructions Body mass index (BMI) can help you see if your weight is raising your risk for health problems. It uses a formula to compare how much you weigh with how tall you are. · A BMI lower than 18.5 is considered underweight. · A BMI between 18.5 and 24.9 is considered healthy. · A BMI between 25 and 29.9 is considered overweight. A BMI of 30 or higher is considered obese. If your BMI is in the normal range, it means that you have a lower risk for weight-related health problems. If your BMI is in the overweight or obese range, you may be at increased risk for weight-related health problems, such as high blood pressure, heart disease, stroke, arthritis or joint pain, and diabetes. If your BMI is in the underweight range, you may be at increased risk for health problems such as fatigue, lower protection (immunity) against illness, muscle loss, bone loss, hair loss, and hormone problems. BMI is just one measure of your risk for weight-related health problems. You may be at higher risk for health problems if you are not active, you eat an unhealthy diet, or you drink too much alcohol or use tobacco products. Follow-up care is a key part of your treatment and safety. Be sure to make and go to all appointments, and call your doctor if you are having problems. It's also a good idea to know your test results and keep a list of the medicines you take. How can you care for yourself at home? · Practice healthy eating habits. This includes eating plenty of fruits, vegetables, whole grains, lean protein, and low-fat dairy. · If your doctor recommends it, get more exercise. Walking is a good choice. Bit by bit, increase the amount you walk every day. Try for at least 30 minutes on most days of the week. · Do not smoke. Smoking can increase your risk for health problems.  If you need help quitting, talk to your doctor about stop-smoking programs and medicines. These can increase your chances of quitting for good. · Limit alcohol to 2 drinks a day for men and 1 drink a day for women. Too much alcohol can cause health problems. If you have a BMI higher than 25 · Your doctor may do other tests to check your risk for weight-related health problems. This may include measuring the distance around your waist. A waist measurement of more than 40 inches in men or 35 inches in women can increase the risk of weight-related health problems. · Talk with your doctor about steps you can take to stay healthy or improve your health. You may need to make lifestyle changes to lose weight and stay healthy, such as changing your diet and getting regular exercise. If you have a BMI lower than 18.5 · Your doctor may do other tests to check your risk for health problems. · Talk with your doctor about steps you can take to stay healthy or improve your health. You may need to make lifestyle changes to gain or maintain weight and stay healthy, such as getting more healthy foods in your diet and doing exercises to build muscle. Where can you learn more? Go to http://yane-cooper.info/. Enter S176 in the search box to learn more about \"Body Mass Index: Care Instructions. \" Current as of: March 28, 2019 Content Version: 12.2 © 9260-8041 [employer]. Care instructions adapted under license by [employer] (which disclaims liability or warranty for this information). If you have questions about a medical condition or this instruction, always ask your healthcare professional. [place] [address] any warranty or liability for your use of this information. Chest Pain: Care Instructions Your Care Instructions There are many things that can cause chest pain. Some are not serious and will get better on their own in a few days.  But some kinds of chest pain need more testing and treatment. Your doctor may have recommended a follow-up visit in the next 8 to 12 hours. If you are not getting better, you may need more tests or treatment. Even though your doctor has released you, you still need to watch for any problems. The doctor carefully checked you, but sometimes problems can develop later. If you have new symptoms or if your symptoms do not get better, get medical care right away. If you have worse or different chest pain or pressure that lasts more than 5 minutes or you passed out (lost consciousness), call 911 or seek other emergency help right away. A medical visit is only one step in your treatment. Even if you feel better, you still need to do what your doctor recommends, such as going to all suggested follow-up appointments and taking medicines exactly as directed. This will help you recover and help prevent future problems. How can you care for yourself at home? · Rest until you feel better. · Take your medicine exactly as prescribed. Call your doctor if you think you are having a problem with your medicine. · Do not drive after taking a prescription pain medicine. When should you call for help? Call 911 if: 
  · You passed out (lost consciousness).  
  · You have severe difficulty breathing.  
  · You have symptoms of a heart attack. These may include: 
? Chest pain or pressure, or a strange feeling in your chest. 
? Sweating. ? Shortness of breath. ? Nausea or vomiting. ? Pain, pressure, or a strange feeling in your back, neck, jaw, or upper belly or in one or both shoulders or arms. ? Lightheadedness or sudden weakness. ? A fast or irregular heartbeat. After you call 911, the  may tell you to chew 1 adult-strength or 2 to 4 low-dose aspirin. Wait for an ambulance. Do not try to drive yourself.  
 Call your doctor today if: 
  · You have any trouble breathing.  
  · Your chest pain gets worse.   · You are dizzy or lightheaded, or you feel like you may faint.  
  · You are not getting better as expected.  
  · You are having new or different chest pain. Where can you learn more? Go to http://yane-cooper.info/. Enter A120 in the search box to learn more about \"Chest Pain: Care Instructions. \" Current as of: June 26, 2019 Content Version: 12.2 © 9135-0507 [employer]. Care instructions adapted under license by [employer] (which disclaims liability or warranty for this information). If you have questions about a medical condition or this instruction, always ask your healthcare professional. [address] any warranty or liability for your use of this information.

## 2020-01-28 NOTE — TELEPHONE ENCOUNTER
Called and left message letting her know that her cardiology appointment has been scheduled for 02/03/2020 at 11 at the [facility] view office.

## 2020-01-28 NOTE — PROGRESS NOTES
SUBJECTIVE:  45 y.o. female for follow up of diabetes. Diabetic Review of Systems - medication compliance: compliant all of the time, diabetic diet compliance: compliant most of the time, home glucose monitoring: is not performed, further diabetic     ROS: no polyuria or polydipsia, no chest pain, dyspnea or TIA's, no numbness, tingling or pain in extremities, patient was seen in the emergency room on 11- for chest pain    Patient declined admission or further evaluation during this Emergency Department ED visit, no acute EKG changes negative CK/troponin X 2. Patient has significant history of CAD and was advised to see PCP and have outpatient stress test which she has not done. Patient has upcoming appointment with her cardiologists Scout [facility]field 3-. last eye exam approximately not due, acute symptoms are none . Other symptoms and concerns: prescription refills . Review of Systems   Respiratory: Negative. Cardiovascular:        Denies chest pain, jaw pain, upper back pain, since ED visit 11-. Neurological: Negative for dizziness. Current Outpatient Medications   Medication Sig Dispense Refill    olmesartan-hydroCHLOROthiazide (BENICAR HCT) 20-12.5 mg per tablet TAKE 1 TABLET BY MOUTH ONCE DAILY 90 Tab 1    magnesium oxide (MAG-OX) 400 mg tablet TAKE 1 TABLET BY MOUTH ONCE DAILY 90 Tab 1    aspirin delayed-release 81 mg tablet Take 1 Tab by mouth daily. 90 Tab 3    metoprolol tartrate (LOPRESSOR) 25 mg tablet Take 0.5 Tabs by mouth two (2) times a day. 180 Tab 1    DULoxetine (CYMBALTA) 60 mg capsule Take 1 Cap by mouth daily. 90 Cap 1    gabapentin (NEURONTIN) 300 mg capsule Take 300 mg by mouth. Take 2 tablet by mouth in the morning, take 1 tablet by mouth at noon, take 1 tablet by mouth at 6:00PM, and take 2 tablets by mouth at bedtime.  multivitamin (ONE A DAY) tablet Take 1 Tab by mouth daily.  30 Tab 11    omega-3 acid ethyl esters (LOVAZA) 1 gram capsule Take 1 Cap by mouth nightly. 80 Cap 3       Patient Active Problem List   Diagnosis Code    CAD (coronary artery disease), native coronary artery I25.10    S/P CABG x 1 Z95.1    Obesity E66.9    Dyslipidemia E78.5    BMI 45.0-49.9, adult ([facility]) Z68.42    Hypomagnesemia E83.42    Diabetes mellitus ([facility]) E11.9    Gastroesophageal reflux disease without esophagitis K21.9    Hypertension, goal below 140/80 I10    ACS (acute coronary syndrome) ([facility]) I24.9    Chest pain R07.9    Pulmonary arterial hypertension ([facility]) I27.21    Abnormal PFT R94.2    Type 2 diabetes mellitus with diabetic neuropathy ([facility]) E11.40    Type 2 diabetes with nephropathy ([facility]) E11.21    Perceptive hearing loss, both sides H90.3     Wt Readings from Last 3 Encounters:   01/28/20 272 lb (123.4 kg)   10/01/19 276 lb (125.2 kg)   08/06/19 281 lb (127.5 kg)     BP Readings from Last 3 Encounters:   01/28/20 130/72   11/20/19 133/73   10/01/19 132/76     PMH: reviewed medications and allergy lists and medical and family history. OBJECTIVE:  Awake and alert in no acute distress  Neck supple without lymphadenopathy, no carotid artery bruits auscultated bilaterally. No thyromegaly  Lungs clear throughout  S1 S2 RRR without ectopy or murmur auscultated.   Extremities: no clubbing, cyanosis, peripheral edema  Integumentary: no rashes  Reviewed vital signs   Visit Vitals  /72 (BP 1 Location: Left arm, BP Patient Position: Sitting)   Pulse 72   Temp 98.1 °F (36.7 °C) (Oral)   Resp 17   Ht 5' 3\" (1.6 m)   Wt 272 lb (123.4 kg)   SpO2 98%   BMI 48.18 kg/m²   POC EKG:   Sinus Bradycardia negative T waves, no ectopy ventricular rate 57 no ectopy  Comparison EKG 01-   NSR possible left atrial enlargement  Nonspecific ST and T wave abnormality  When compared to EKG 11- T wave inversion no longer evident in inferior leads   T wave inversion left evident in anterolateral leads   Diabetic foot exam:     Left Foot:   Visual Exam: normal    Pulse DP: 2+ (normal)   Filament test: normal sensation    Vibratory sensation: normal      Right Foot:   Visual Exam: normal    Pulse DP: 2+ (normal)   Filament test: normal sensation    Vibratory sensation: normal    Diagnoses and all orders for this visit:    Type 2 diabetes mellitus with hyperglycemia, without long-term current use of insulin ([facility]  -     LIPID PANEL; Future  -     HEMOGLOBIN A1C WITH EAG; Future  -      DIABETES FOOT EXAM  -     METABOLIC PANEL, COMPREHENSIVE; Future  -     metoprolol tartrate (LOPRESSOR) 25 mg tablet; Take 0.5 Tabs by mouth two (2) times a day., Normal, Disp-180 Tab, R-1Please consider 90 day supplies to promote better adherence  -     REFERRAL TO CARDIOLOGY    Dyslipidemia    Essential hypertension  -     metoprolol tartrate (LOPRESSOR) 25 mg tablet; Take 0.5 Tabs by mouth two (2) times a day., Normal, Disp-180 Tab, R-1Please consider 90 day supplies to promote better adherence    Coronary artery disease involving native coronary artery of native heart with angina pectoris (HCC)  -     AMB POC EKG ROUTINE W/ 12 LEADS, INTER & REP  -     REFERRAL TO CARDIOLOGY    S/P CABG x 1  -     AMB POC EKG ROUTINE W/ 12 LEADS, INTER & REP  -     REFERRAL TO CARDIOLOGY    History of chest pain  -     REFERRAL TO CARDIOLOGY    EKG abnormalities  -     REFERRAL TO CARDIOLOGY    BMI 45.0-49.9, adult (HCC)    Anticipatory guidance regarding emergency care if symptoms worsen and patient verbalizes understanding. Have referred patient to cardiology urgently will call patient once appointment confirmed  Patient notified of cardiology appointment see telephone encounter. Patient agrees with plan and verbalizes understanding. I have discussed the diagnosis with the patient and the intended plan as seen in the above orders. The patient has received an after-visit summary and questions were answered concerning future plans.   I have discussed medication side effects and warnings with the patient as well.    Follow-up and Dispositions    · Return for WWE with PAP .

## 2020-01-28 NOTE — PROGRESS NOTES
Chief Complaint   Patient presents with    Diabetes     1. Have you been to the ER, urgent care clinic since your last visit? Hospitalized since your last visit? No    2. Have you seen or consulted any other health care providers outside of the [address] [place] [address] since your last visit? Include any pap smears or colon screening.  [facility] ED 11/20/19 for chest pain

## 2020-01-29 NOTE — PROGRESS NOTES
Aidee Ponce presents today for evaluation of abnormal EKG per her PCP. Due to her history of CAD, it was recommended that she follow-up with us. She was last seen by Dr. Tyson Kendrick in March 2019. She is a 45year old female with a history of CAD (s/p CABG x 1 in 2011), dyslipidemia, pleural effusion, and GERD. She presented to the ER on 11/20/19 with complaints of chest pain. Her cardiac enzymes/troponins were negative x 2 sets and she ruled out for MI. She declined admission and further work-up. Her last echo was done in July 2015 and it showed an EF of 50-55%, no WMA, mild concentric LVH, and PASP of 45-50 mmHg. Her last stress test was performed in Nov. 2016 and it showed a mostly fixed anteroapical defect with partial reversibility. There is no septal involvement. PMH:  Past Medical History:   Diagnosis Date    Abnormal PFT 10/1/2015    Dr. Marcos Schroeder, Pulmonology    Acid reflux     CABG x 1 12/31/2011    LIMA-LAD    CAD (coronary artery disease)     Cardiac cath 11/23/2016    R dom. oLAD 85%. Otherwise patent coronaries. HECTOR to LAD patent. LVEDP 22 mmHg. EF 55%. No RWMA.  Cardiac echocardiogram 07/28/2015    Tech difficult. EF 50-55%. No WMA. Mild conc LVH. Gr 2 DDfx. RVSP 45-50 mmHg. Mild LAE. Mild ROSITA. Mod TR. Mild IVCE.  Cardiac nuclear imaging test, high risk 11/23/2016    High risk. Mainly fixed anteroapical defect w/partial reversibility. EF 60%. No RWMA. Nondiagnostic EKG on pharm stress test.    Cardiovascular upper extremity arterial duplex 01/03/2014    No significant occlusive arterial disease bilaterally.     Coronary atherosclerosis of native coronary artery     Diabetes mellitus ([facility] 9/22/14    hgbA1C 6.8    Diabetic eye exam ([facility] 2016    Dyslipidemia     GERD (gastroesophageal reflux disease)     H/O screening mammography 12/06/2016    no evidence of malignancy    Hypercholesterolemia     Hypomagnesemia 8/14/14    1.4    Pleural effusion, left     s/p CABG and thoracentesis with removal of 900 mL    Pulmonary arterial hypertension (HCC)        PSH:  Past Surgical History:   Procedure Laterality Date    CARDIAC CATHETERIZATION  2016         CARDIAC SURG PROCEDURE UNLIST      CORONARY ARTERY ANGIOGRAM  2016         HX  SECTION      HX CORONARY ARTERY BYPASS GRAFT  12/31/2011    x1    LV ANGIOGRAPHY  2016            MEDS:  Current Outpatient Medications   Medication Sig    lidocaine (LIDODERM) 5 % APPLY 1 NEW PATCH TO SKIN FOR LEFT DORSAL PAIN UP TO 3 PATCHES EACH TIME Q 12 HOURS PRN.  nitroglycerin (NITROSTAT) 0.4 mg SL tablet DISSOLVE ONE TABLET UNDER THE TONGUE AT THE START OF CHEST PAIN EVERY 5 MINUTES UP TO 3 DOSES    metoprolol tartrate (LOPRESSOR) 25 mg tablet Take 0.5 Tabs by mouth two (2) times a day.  olmesartan-hydroCHLOROthiazide (BENICAR HCT) 20-12.5 mg per tablet TAKE 1 TABLET BY MOUTH ONCE DAILY    magnesium oxide (MAG-OX) 400 mg tablet TAKE 1 TABLET BY MOUTH ONCE DAILY    aspirin delayed-release 81 mg tablet Take 1 Tab by mouth daily.  DULoxetine (CYMBALTA) 60 mg capsule Take 1 Cap by mouth daily.  gabapentin (NEURONTIN) 300 mg capsule Take 300 mg by mouth. Take 2 tablet by mouth in the morning, take 1 tablet by mouth at noon, take 1 tablet by mouth at 6:00PM, and take 2 tablets by mouth at bedtime.  multivitamin (ONE A DAY) tablet Take 1 Tab by mouth daily.  omega-3 acid ethyl esters (LOVAZA) 1 gram capsule Take 1 Cap by mouth nightly. No current facility-administered medications for this visit. Allergies and Sensitivities:  Allergies   Allergen Reactions    Contrave [Naltrexone-Bupropion] Other (comments)     Severe constipation    Metformin Nausea Only       Family History:  Family History   Problem Relation Age of Onset    Diabetes Mother     Hypertension Father     Alzheimer Father 61       Social History:  She  reports that she has never smoked.  She has never used smokeless tobacco.  She  reports no history of alcohol use. Physical:  Visit Vitals  /74 (BP 1 Location: Left arm, BP Patient Position: Sitting)   Pulse (!) 57   Resp 18   Ht 5' 3\" (1.6 m)   Wt 123.5 kg (272 lb 3.2 oz)   SpO2 96%   BMI 48.22 kg/m²         Exam:  Neck:  Supple, no JVD, no carotid bruits  CV:  Normal S1 and  S2, no murmurs, rubs, or gallops noted  Lungs:  Clear to ausculation throughout, no wheezes or rales  Abd:  Soft, non-tender, non-distended with good bowel sounds. No hepatosplenomegaly  Extremities:  No edema      Data:  EKG:   Read by Lindsey Narayanan MD.  Sinus bradycardia.  Left atrial enlargement.  Diffuse nonspecific T-abnormality      LABS:  Lab Results   Component Value Date/Time    Sodium 142 01/28/2020 08:21 AM    Potassium 3.9 01/28/2020 08:21 AM    Chloride 108 01/28/2020 08:21 AM    CO2 30 01/28/2020 08:21 AM    Glucose 106 (H) 01/28/2020 08:21 AM    BUN 15 01/28/2020 08:21 AM    Creatinine 0.97 01/28/2020 08:21 AM     Lab Results   Component Value Date/Time    Cholesterol, total 261 (H) 01/28/2020 08:21 AM    HDL Cholesterol 60 01/28/2020 08:21 AM    LDL, calculated 180.6 (H) 01/28/2020 08:21 AM    Triglyceride 102 01/28/2020 08:21 AM    CHOL/HDL Ratio 4.4 01/28/2020 08:21 AM     Lab Results   Component Value Date/Time    ALT (SGPT) 55 01/28/2020 08:21 AM         Impression/Plan:  1. CAD, s/p CABG x 1 on 12/31/11  2. Hypertension, blood pressure controlled  3. Dyslipidemia, on [place]  4. Obesity    Mrs. Jose Maria Orozco presents today for evaluation and follow-up of an abnormal EKG. She presented to the ER in Nov. 2019 with complaints of chest pain and ruled out for MI by cardiac enzymes. Admission and further work-up were recommended but she declined. She saw her PCP last week and it was recommended that she follow-up with cardiology. Her EKG today is similar to the EKG done at her PCP's office on 1/28/20.   There are diffuse, non-specific T wave abnormalities. She denies chest pain, shortness of breath. She states that since she had the CABG in 2011, she always has chest heaviness and left arm tingling and numbness. Her last stress test was performed in Nov. 2016 which showed  An apital filling defect and she had another cath done in Nov. 2016 and it showed that the LIMA to LAD was widely patent. At this time, will request that she undergo a screening pharmacologic nuclear stress test.  She understands the rationale for the testing and is willing to proceed. She will follow-up with Dr. Ladi Gutierrez as scheduled and PRN. Johnathon Parnell MSN, FNP-BC    Please note:  Portions of this chart were created with Dragon medical speech to text program.  Unrecognized errors may be present.

## 2020-02-03 ENCOUNTER — OFFICE VISIT (OUTPATIENT)
Dept: CARDIOLOGY CLINIC | Age: 50
End: 2020-02-03

## 2020-02-03 VITALS
HEIGHT: 63 IN | DIASTOLIC BLOOD PRESSURE: 74 MMHG | HEART RATE: 57 BPM | SYSTOLIC BLOOD PRESSURE: 122 MMHG | RESPIRATION RATE: 18 BRPM | BODY MASS INDEX: 48.23 KG/M2 | WEIGHT: 272.2 LBS | OXYGEN SATURATION: 96 %

## 2020-02-03 DIAGNOSIS — R94.31 ABNORMAL EKG: ICD-10-CM

## 2020-02-03 DIAGNOSIS — I10 HYPERTENSION, GOAL BELOW 140/80: ICD-10-CM

## 2020-02-03 DIAGNOSIS — E78.5 DYSLIPIDEMIA: ICD-10-CM

## 2020-02-03 DIAGNOSIS — I25.10 CORONARY ARTERY DISEASE INVOLVING NATIVE CORONARY ARTERY OF NATIVE HEART WITHOUT ANGINA PECTORIS: Primary | ICD-10-CM

## 2020-02-03 NOTE — LETTER
NOTIFICATION RETURN TO WORK / SCHOOL 
 
2/3/2020 11:51 AM 
 
Ms. Gigi Pimentel : 68 
[address] [place] [id number] To Whom It May Concern: 
 
Gigi Pimentel is currently under the care of [address] [place] [address]. She will return to work on: 2/3/20 If there are questions or concerns please have the patient contact our office.  
 
 
 
Sincerely, 
 
 
 
 
Philippe Law NP

## 2020-02-03 NOTE — PATIENT INSTRUCTIONS
Pharmacologic nuclear stress test; Dx: Abnormal EKG, hx of CAD and CABG x 1 in 2011 If you do not hear from Claudia in 2-3 business days, please call [phone] to schedule your test 
Follow-up with Dr Phillip Eden as scheduled and as needed.

## 2020-02-03 NOTE — PROGRESS NOTES
Dinorah Arguello presents today for No chief complaint on file. Dinorah Arguello preferred language for health care discussion is english/other. Is someone accompanying this pt? Yes, spouse    Is the patient using any DME equipment during [address]? Yes, cane    Depression Screening:  3 most recent PHQ Screens 1/28/2020   Little interest or pleasure in doing things Not at all   Feeling down, depressed, irritable, or hopeless Not at all   Total Score PHQ 2 0       Learning Assessment:  Learning Assessment 3/17/2017   PRIMARY LEARNER Patient   HIGHEST LEVEL OF EDUCATION - PRIMARY LEARNER  -   BARRIERS PRIMARY LEARNER -   CO-LEARNER CAREGIVER -   PRIMARY LANGUAGE ENGLISH   LEARNER PREFERENCE PRIMARY DEMONSTRATION   ANSWERED BY patient   RELATIONSHIP SELF       Abuse Screening:  Abuse Screening Questionnaire 1/30/2018   Do you ever feel afraid of your partner? N   Are you in a relationship with someone who physically or mentally threatens you? N   Is it safe for you to go home? Y       Fall Risk  No flowsheet data found. Pt currently taking Anticoagulant therapy? Yes, asa    Coordination of Care:  1. Have you been to the ER, urgent care clinic since your last visit? Hospitalized since your last visit? no    2. Have you seen or consulted any other health care providers outside of the [address] [place] [address] since your last visit? Include any pap smears or colon screening.  no

## 2020-02-11 ENCOUNTER — HOSPITAL ENCOUNTER (OUTPATIENT)
Age: 50
Setting detail: OBSERVATION
Discharge: HOME | End: 2020-02-12
Attending: EMERGENCY MEDICINE | Admitting: FAMILY MEDICINE
Payer: COMMERCIAL

## 2020-02-11 ENCOUNTER — APPOINTMENT (OUTPATIENT)
Dept: GENERAL RADIOLOGY | Age: 50
End: 2020-02-11
Attending: EMERGENCY MEDICINE
Payer: COMMERCIAL

## 2020-02-11 DIAGNOSIS — R07.9 CHEST PAIN, UNSPECIFIED TYPE: Primary | ICD-10-CM

## 2020-02-11 DIAGNOSIS — R07.2 PRECORDIAL PAIN: ICD-10-CM

## 2020-02-11 LAB
ANION GAP SERPL CALC-SCNC: 8 MMOL/L (ref 3–18)
BASOPHILS # BLD: 0 K/UL (ref 0–0.1)
BASOPHILS NFR BLD: 0 % (ref 0–2)
BUN SERPL-MCNC: 16 MG/DL (ref 7–18)
BUN/CREAT SERPL: 16 (ref 12–20)
CALCIUM SERPL-MCNC: 9.2 MG/DL (ref 8.5–10.1)
CHLORIDE SERPL-SCNC: 105 MMOL/L (ref 100–111)
CK MB CFR SERPL CALC: ABNORMAL % (ref 0–4)
CK MB SERPL-MCNC: <1 NG/ML (ref 5–25)
CK SERPL-CCNC: 200 U/L (ref 26–192)
CO2 SERPL-SCNC: 30 MMOL/L (ref 21–32)
CREAT SERPL-MCNC: 1.02 MG/DL (ref 0.6–1.3)
DIFFERENTIAL METHOD BLD: ABNORMAL
EOSINOPHIL # BLD: 0.2 K/UL (ref 0–0.4)
EOSINOPHIL NFR BLD: 2 % (ref 0–5)
ERYTHROCYTE [DISTWIDTH] IN BLOOD BY AUTOMATED COUNT: 16 % (ref 11.6–14.5)
GLUCOSE SERPL-MCNC: 100 MG/DL (ref 74–99)
HCT VFR BLD AUTO: 38.2 % (ref 35–45)
HGB BLD-MCNC: 12.2 G/DL (ref 12–16)
LIPASE SERPL-CCNC: 135 U/L (ref 73–393)
LYMPHOCYTES # BLD: 2.9 K/UL (ref 0.9–3.6)
LYMPHOCYTES NFR BLD: 38 % (ref 21–52)
MCH RBC QN AUTO: 27.3 PG (ref 24–34)
MCHC RBC AUTO-ENTMCNC: 31.9 G/DL (ref 31–37)
MCV RBC AUTO: 85.5 FL (ref 74–97)
MONOCYTES # BLD: 0.6 K/UL (ref 0.05–1.2)
MONOCYTES NFR BLD: 7 % (ref 3–10)
NEUTS SEG # BLD: 4 K/UL (ref 1.8–8)
NEUTS SEG NFR BLD: 53 % (ref 40–73)
PLATELET # BLD AUTO: 144 K/UL (ref 135–420)
PMV BLD AUTO: 13 FL (ref 9.2–11.8)
POTASSIUM SERPL-SCNC: 3.7 MMOL/L (ref 3.5–5.5)
RBC # BLD AUTO: 4.47 M/UL (ref 4.2–5.3)
SODIUM SERPL-SCNC: 143 MMOL/L (ref 136–145)
TROPONIN I SERPL-MCNC: <0.02 NG/ML (ref 0–0.04)
WBC # BLD AUTO: 7.6 K/UL (ref 4.6–13.2)

## 2020-02-11 PROCEDURE — 93005 ELECTROCARDIOGRAM TRACING: CPT

## 2020-02-11 PROCEDURE — 99285 EMERGENCY DEPT VISIT HI MDM: CPT

## 2020-02-11 PROCEDURE — 74011250636 HC RX REV CODE- 250/636: Performed by: EMERGENCY MEDICINE

## 2020-02-11 PROCEDURE — 85025 COMPLETE CBC W/AUTO DIFF WBC: CPT

## 2020-02-11 PROCEDURE — 83690 ASSAY OF LIPASE: CPT

## 2020-02-11 PROCEDURE — 82550 ASSAY OF CK (CPK): CPT

## 2020-02-11 PROCEDURE — 99218 HC RM OBSERVATION: CPT

## 2020-02-11 PROCEDURE — 74011250637 HC RX REV CODE- 250/637: Performed by: EMERGENCY MEDICINE

## 2020-02-11 PROCEDURE — 80048 BASIC METABOLIC PNL TOTAL CA: CPT

## 2020-02-11 PROCEDURE — 71045 X-RAY EXAM CHEST 1 VIEW: CPT

## 2020-02-11 PROCEDURE — 65660000000 HC RM CCU STEPDOWN

## 2020-02-11 RX ORDER — GUAIFENESIN 100 MG/5ML
81 LIQUID (ML) ORAL DAILY
Status: COMPLETED | OUTPATIENT
Start: 2020-02-11 | End: 2020-02-12

## 2020-02-11 RX ORDER — ENOXAPARIN SODIUM 100 MG/ML
120 INJECTION SUBCUTANEOUS
Status: COMPLETED | OUTPATIENT
Start: 2020-02-11 | End: 2020-02-11

## 2020-02-11 RX ORDER — SODIUM CHLORIDE 9 MG/ML
1000 INJECTION, SOLUTION INTRAVENOUS ONCE
Status: COMPLETED | OUTPATIENT
Start: 2020-02-11 | End: 2020-02-11

## 2020-02-11 RX ADMIN — ENOXAPARIN SODIUM 120 MG: 60 INJECTION SUBCUTANEOUS at 23:36

## 2020-02-11 RX ADMIN — ASPIRIN 81 MG 81 MG: 81 TABLET ORAL at 21:42

## 2020-02-11 RX ADMIN — NITROGLYCERIN 1 INCH: 20 OINTMENT TOPICAL at 20:35

## 2020-02-11 RX ADMIN — SODIUM CHLORIDE 1000 ML: 900 INJECTION, SOLUTION INTRAVENOUS at 20:36

## 2020-02-11 NOTE — LETTER
[address] [place] [address] Dr [facility] EMERGENCY DEPT 
[address] [facility] [id number] [phone] Work/School Note Date: 2/11/2020 To Whom It May concern: 
 
Real Smallwood was seen and treated today in the emergency room by the following provider(s): 
No providers found. Real Smallwood may return to work after cleared by Cardiology.  
 
Sincerely, 
 
 
 
 
Dao Mccormack RN

## 2020-02-12 VITALS
DIASTOLIC BLOOD PRESSURE: 75 MMHG | HEIGHT: 63 IN | SYSTOLIC BLOOD PRESSURE: 122 MMHG | TEMPERATURE: 97.8 F | RESPIRATION RATE: 15 BRPM | BODY MASS INDEX: 48.2 KG/M2 | OXYGEN SATURATION: 98 % | HEART RATE: 58 BPM | WEIGHT: 272 LBS

## 2020-02-12 LAB
ANION GAP SERPL CALC-SCNC: 8 MMOL/L (ref 3–18)
BASOPHILS # BLD: 0 K/UL (ref 0–0.1)
BASOPHILS NFR BLD: 0 % (ref 0–2)
BUN SERPL-MCNC: 14 MG/DL (ref 7–18)
BUN/CREAT SERPL: 15 (ref 12–20)
CALCIUM SERPL-MCNC: 8.8 MG/DL (ref 8.5–10.1)
CHLORIDE SERPL-SCNC: 108 MMOL/L (ref 100–111)
CK MB CFR SERPL CALC: NORMAL % (ref 0–4)
CK MB CFR SERPL CALC: NORMAL % (ref 0–4)
CK MB SERPL-MCNC: <1 NG/ML (ref 5–25)
CK MB SERPL-MCNC: <1 NG/ML (ref 5–25)
CK SERPL-CCNC: 154 U/L (ref 26–192)
CK SERPL-CCNC: 162 U/L (ref 26–192)
CO2 SERPL-SCNC: 28 MMOL/L (ref 21–32)
CREAT SERPL-MCNC: 0.91 MG/DL (ref 0.6–1.3)
DIFFERENTIAL METHOD BLD: ABNORMAL
EOSINOPHIL # BLD: 0.1 K/UL (ref 0–0.4)
EOSINOPHIL NFR BLD: 2 % (ref 0–5)
ERYTHROCYTE [DISTWIDTH] IN BLOOD BY AUTOMATED COUNT: 16 % (ref 11.6–14.5)
GLUCOSE SERPL-MCNC: 111 MG/DL (ref 74–99)
HCT VFR BLD AUTO: 37.8 % (ref 35–45)
HGB BLD-MCNC: 12 G/DL (ref 12–16)
LYMPHOCYTES # BLD: 1.8 K/UL (ref 0.9–3.6)
LYMPHOCYTES NFR BLD: 32 % (ref 21–52)
MCH RBC QN AUTO: 27.1 PG (ref 24–34)
MCHC RBC AUTO-ENTMCNC: 31.7 G/DL (ref 31–37)
MCV RBC AUTO: 85.5 FL (ref 74–97)
MONOCYTES # BLD: 0.4 K/UL (ref 0.05–1.2)
MONOCYTES NFR BLD: 7 % (ref 3–10)
NEUTS SEG # BLD: 3.3 K/UL (ref 1.8–8)
NEUTS SEG NFR BLD: 59 % (ref 40–73)
PLATELET # BLD AUTO: 133 K/UL (ref 135–420)
PMV BLD AUTO: 13.4 FL (ref 9.2–11.8)
POTASSIUM SERPL-SCNC: 3.6 MMOL/L (ref 3.5–5.5)
RBC # BLD AUTO: 4.42 M/UL (ref 4.2–5.3)
SODIUM SERPL-SCNC: 144 MMOL/L (ref 136–145)
TROPONIN I SERPL-MCNC: <0.02 NG/ML (ref 0–0.04)
TROPONIN I SERPL-MCNC: <0.02 NG/ML (ref 0–0.04)
WBC # BLD AUTO: 5.6 K/UL (ref 4.6–13.2)

## 2020-02-12 PROCEDURE — 96372 THER/PROPH/DIAG INJ SC/IM: CPT

## 2020-02-12 PROCEDURE — 80048 BASIC METABOLIC PNL TOTAL CA: CPT

## 2020-02-12 PROCEDURE — 93005 ELECTROCARDIOGRAM TRACING: CPT

## 2020-02-12 PROCEDURE — 85025 COMPLETE CBC W/AUTO DIFF WBC: CPT

## 2020-02-12 PROCEDURE — 99218 HC RM OBSERVATION: CPT

## 2020-02-12 PROCEDURE — 82550 ASSAY OF CK (CPK): CPT

## 2020-02-12 PROCEDURE — 74011250637 HC RX REV CODE- 250/637: Performed by: EMERGENCY MEDICINE

## 2020-02-12 RX ORDER — ACETAMINOPHEN 500 MG
1000 TABLET ORAL
Status: COMPLETED | OUTPATIENT
Start: 2020-02-12 | End: 2020-02-12

## 2020-02-12 RX ORDER — GABAPENTIN 300 MG/1
600 CAPSULE ORAL 3 TIMES DAILY
Status: DISCONTINUED | OUTPATIENT
Start: 2020-02-12 | End: 2020-02-12 | Stop reason: HOSPADM

## 2020-02-12 RX ORDER — METOPROLOL TARTRATE 25 MG/1
25 TABLET, FILM COATED ORAL DAILY
Status: DISCONTINUED | OUTPATIENT
Start: 2020-02-12 | End: 2020-02-12 | Stop reason: HOSPADM

## 2020-02-12 RX ORDER — LANOLIN ALCOHOL/MO/W.PET/CERES
400 CREAM (GRAM) TOPICAL DAILY
Status: DISCONTINUED | OUTPATIENT
Start: 2020-02-12 | End: 2020-02-12 | Stop reason: HOSPADM

## 2020-02-12 RX ADMIN — ACETAMINOPHEN 1000 MG: 500 TABLET ORAL at 08:03

## 2020-02-12 RX ADMIN — GABAPENTIN 600 MG: 300 CAPSULE ORAL at 12:32

## 2020-02-12 RX ADMIN — ASPIRIN 81 MG 81 MG: 81 TABLET ORAL at 08:03

## 2020-02-12 RX ADMIN — Medication 400 MG: at 09:43

## 2020-02-12 RX ADMIN — METOPROLOL TARTRATE 25 MG: 25 TABLET ORAL at 09:47

## 2020-02-12 NOTE — ED NOTES
Patient has been resting comfortably in the emergency room since her hospital admission. She is remained the emergency department awaiting bed availability at [facility].  I have just spoken with the cardiology consultant and they have arranged for the patient to have her cardiac stress test tomorrow morning. I have also called and spoken with the patient directly and made her aware of these plans. I have also spoken with the patient and she says she feels comfortable and is ready to go home and agrees to follow-up tomorrow morning with cardiology as scheduled. At this point she will be discharged from the hospital and will leave the emergency department.   Finn Cherry MD 11:35 AM

## 2020-02-12 NOTE — ED NOTES
Report received/care assumed. Pt is awake/alert/oriented; affect calm/conversant, respirations regular/non labored/skin warm and dry. Denies dizziness, shortness of breath, chest pain, or other concerns. Able to walk independently with staff accompaniment to bathroom and back; currently resting in bed with rails up and call light in reach. Plan of care with current status of pending bed placement discussed, as well as plan of care while pt waiting in ED. Pt voiced her understanding. Pt instructed to notify staff if she needs assistance and to avoid falls; importance of fall/accident prevention discussed while pt on blood thinners discussed. Repeat cardiac panel pending.

## 2020-02-12 NOTE — ED TRIAGE NOTES
Pt had cp yesterday that resolved @ 1900 hrs. .. intermittent cp today. . sharp then \"goes away\". .. Pt denies associated symptoms yesterday or today. Angella Jesusitaing

## 2020-02-12 NOTE — PROGRESS NOTES
conducted an initial consultation and Spiritual Assessment for Kathy Garrido, who is a 45 y.o.,female. Patient's Primary Language is: [place]. According to the patient's EMR Gnosticism Affiliation is: [facility].     The reason the Patient came to the hospital is:   Patient Active Problem List    Diagnosis Date Noted    Type 2 diabetes with nephropathy ([facility] 02/23/2019    Type 2 diabetes mellitus with diabetic neuropathy ([facility] 08/04/2018    Pulmonary arterial hypertension ([facility]     Chest pain 11/23/2016    ACS (acute coronary syndrome) ([facility] 11/22/2016    Hypertension, goal below 140/80 09/26/2016    Gastroesophageal reflux disease without esophagitis 12/07/2015    Abnormal PFT 10/01/2015    Perceptive hearing loss, both sides 04/21/2015    Diabetes [facility] 09/24/2014    Hypomagnesemia 08/18/2014    BMI 45.0-49.9, adult ([facility] 05/14/2014    Obesity 01/31/2012    Dyslipidemia 01/31/2012    CAD (coronary artery disease), native coronary artery 01/23/2012    S/P CABG x 1 01/23/2012        The  provided the following Interventions:  Initiated a relationship of care and support. Explored issues of simeon, belief, spirituality and Alevism/ritual needs while hospitalized. Listened empathically. Provided chaplaincy education. Provided information about Spiritual Care Services. Offered prayer and assurance of continued prayers on patient's behalf. Chart reviewed. The following outcomes where achieved:  Patient shared limited information about both their medical narrative and spiritual journey/beliefs.  confirmed Patient's Gnosticism Affiliation. Patient processed feeling about current hospitalization. Patient expressed gratitude for 's visit. Assessment:  Patient does not have any Alevism/cultural needs that will affect patient's preferences in health care. There are no spiritual or Alevism issues which require intervention at this time. Plan:  Chaplains will continue to follow and will provide pastoral care on an as needed/requested basis.  recommends bedside caregivers page  on duty if patient shows signs of acute spiritual or emotional distress.     [address] [facility]   [phone]

## 2020-02-12 NOTE — ED NOTES
Report has been given to Rod Zhou RN; pt has been introduced to oncoming staff with explanation of plan of care provided; includes VS/MAR review. Pt complaint/hx/findings/plan of care explained/understood. Pt given opportunity to ask further questions/provide other comments; denies further needs at this time.

## 2020-02-12 NOTE — ED NOTES
Pt watching TV; repeat iv started/blood drawn. Plan of care including wait times explained/understood. Rails up x 2 with call light in reach.

## 2020-02-12 NOTE — ED PROVIDER NOTES
Pt c/o mid chest pain, off and on x one day, lasts few min per.  w mid upper back pain. Pain worse w movement. No fever or cough. No nausea. No leg pain. No injury, no new meds taken for pain. On asa, took today. H/o prior cabg , last stress test , due for repeat next Monday. Seen here for pain , this is first time having pain since. Dr Junior Dyer is cardiology. Past Medical History:   Diagnosis Date    Abnormal PFT 10/1/2015    Dr. Mary Schroeder, Pulmonology    Acid reflux     CABG x 1 2011    LIMA-LAD    CAD (coronary artery disease)     Cardiac cath 2016    R dom. oLAD 85%. Otherwise patent coronaries. HECTOR to LAD patent. LVEDP 22 mmHg. EF 55%. No RWMA.  Cardiac echocardiogram 2015    Tech difficult. EF 50-55%. No WMA. Mild conc LVH. Gr 2 DDfx. RVSP 45-50 mmHg. Mild LAE. Mild ROSITA. Mod TR. Mild IVCE.  Cardiac nuclear imaging test, high risk 2016    High risk. Mainly fixed anteroapical defect w/partial reversibility. EF 60%. No RWMA. Nondiagnostic EKG on pharm stress test.    Cardiovascular upper extremity arterial duplex 2014    No significant occlusive arterial disease bilaterally.     Coronary atherosclerosis of native coronary artery     Diabetes mellitus ([address] 14    hgbA1C 6.8    Diabetic eye exam ([address]     Dyslipidemia     GERD (gastroesophageal reflux disease)     H/O screening mammography 2016    no evidence of malignancy    Hypercholesterolemia     Hypomagnesemia 14    1.4    Pleural effusion, left     s/p CABG and thoracentesis with removal of 900 mL    Pulmonary arterial hypertension ([address]        Past Surgical History:   Procedure Laterality Date    CARDIAC CATHETERIZATION  2016         CARDIAC SURG PROCEDURE UNLIST      CORONARY ARTERY ANGIOGRAM  2016         HX  SECTION      HX CORONARY ARTERY BYPASS GRAFT  12/31/2011    x1    LV ANGIOGRAPHY  2016 Family History:   Problem Relation Age of Onset    Diabetes Mother     Hypertension Father     Alzheimer Father 61       Social History     Socioeconomic History    Marital status:      Spouse name: Not on file    Number of children: Not on file    Years of education: Not on file    Highest education level: Not on file   Occupational History    Not on file   Social Needs    Financial resource strain: Not on file    Food insecurity:     Worry: Not on file     Inability: Not on file    Transportation needs:     Medical: Not on file     Non-medical: Not on file   Tobacco Use    Smoking status: Never Smoker    Smokeless tobacco: Never Used    Tobacco comment: 2nd home smoking from mother during 1st 13 yrs of life   Substance and Sexual Activity    Alcohol use: No     Alcohol/week: 1.0 standard drinks     Types: 1 Standard drinks or equivalent per week    Drug use: No    Sexual activity: Yes     Partners: Male     Birth control/protection: None   Lifestyle    Physical activity:     Days per week: Not on file     Minutes per session: Not on file    Stress: Not on file   Relationships    Social connections:     Talks on phone: Not on file     Gets together: Not on file     Attends [employer] service: Not on file     Active member of club or organization: Not on file     Attends meetings of clubs or organizations: Not on file     Relationship status: Not on file    Intimate partner violence:     Fear of current or ex partner: Not on file     Emotionally abused: Not on file     Physically abused: Not on file     Forced sexual activity: Not on file   Other Topics Concern     Service Not Asked    Blood Transfusions Not Asked    Caffeine Concern No    Occupational Exposure Not Asked   Judene Pill Hazards Not Asked    Sleep Concern Not Asked    Stress Concern Not Asked    Weight Concern Not Asked    Special Diet Not Asked    Back Care Not Asked    Exercise Yes     Comment: stretching and ride bike twice a week    Bike Helmet Not Asked    Seat Belt Yes    Self-Exams Not Asked   Social History Narrative    Not on file         ALLERGIES: Contrave [naltrexone-bupropion] and Metformin    Review of Systems   Constitutional: Negative for fever. HENT: Negative for congestion. Respiratory: Negative for cough and shortness of breath. Cardiovascular: Positive for chest pain. Gastrointestinal: Negative for abdominal pain, blood in stool and vomiting. Musculoskeletal: Positive for back pain. Skin: Negative for rash. Neurological: Negative for light-headedness. All other systems reviewed and are negative. Vitals:    02/11/20 2200 02/12/20 0000 02/12/20 0200 02/12/20 0220   BP: 100/51 98/54 (!) 74/40 93/54   Pulse: (!) 53 (!) 57 62 63   Resp: (!) 7 15 16 16   Temp: 97.9 °F (36.6 °C)   97.6 °F (36.4 °C)   SpO2: 99% 95% 93% 91%   Weight:       Height:                Physical Exam  Vitals signs and nursing note reviewed. Constitutional:       Appearance: She is well-developed. She is not diaphoretic. HENT:      Head: Normocephalic and atraumatic. Eyes:      Pupils: Pupils are equal, round, and reactive to light. Neck:      Musculoskeletal: Normal range of motion. Cardiovascular:      Rate and Rhythm: Normal rate and regular rhythm. Heart sounds: No murmur. Pulmonary:      Effort: Pulmonary effort is normal.      Breath sounds: No wheezing. Abdominal:      Palpations: Abdomen is soft. Tenderness: There is no abdominal tenderness. Musculoskeletal:         General: No tenderness. Skin:     General: Skin is dry. Capillary Refill: Capillary refill takes less than 2 seconds. Findings: No rash. Neurological:      Mental Status: She is alert and oriented to person, place, and time.           MDM       Procedures      Vitals:  Patient Vitals for the past 12 hrs:   Temp Pulse Resp BP SpO2   02/12/20 0220 97.6 °F (36.4 °C) 63 16 93/54 91 %   02/12/20 0200  62 16 (!) 74/40 93 %   02/12/20 0000  (!) 57 15 98/54 95 %   02/11/20 2200 97.9 °F (36.6 °C) (!) 53 (!) 7 100/51 99 %   02/11/20 2132  (!) 53 16 101/59 96 %   02/11/20 2106  (!) 57 22 105/62 97 %   02/11/20 1931 98.5 °F (36.9 °C) 64 20 (!) 137/95 95 %   02/11/20 1930  (!) 55 (!) 4  95 %   02/11/20 1921    (!) 137/95          Medications ordered:   Medications   aspirin chewable tablet 81 mg (81 mg Oral Given 2/11/20 2142)   nitroglycerin (NITROBID) 2 % ointment 1 Inch (1 Inch Topical Given 2/11/20 2035)   0.9% sodium chloride infusion 1,000 mL (0 mL IntraVENous IV Completed 2/11/20 2136)   enoxaparin (LOVENOX) injection 120 mg (120 mg SubCUTAneous Given 2/11/20 2336)         Lab findings:  Recent Results (from the past 12 hour(s))   EKG, 12 LEAD, INITIAL    Collection Time: 02/11/20  7:14 PM   Result Value Ref Range    Ventricular Rate 57 BPM    Atrial Rate 57 BPM    P-R Interval 142 ms    QRS Duration 92 ms    Q-T Interval 406 ms    QTC Calculation (Bezet) 395 ms    Calculated P Axis 14 degrees    Calculated R Axis 72 degrees    Calculated T Axis 145 degrees    Diagnosis       Sinus bradycardia  Cannot rule out Anterior infarct , age undetermined  T wave abnormality, consider lateral ischemia  Abnormal ECG  When compared with ECG of 20-NOV-2019 18:32,  Inverted T waves have replaced nonspecific T wave abnormality in Lateral   leads     CBC WITH AUTOMATED DIFF    Collection Time: 02/11/20  7:52 PM   Result Value Ref Range    WBC 7.6 4.6 - 13.2 K/uL    RBC 4.47 4.20 - 5.30 M/uL    HGB 12.2 12.0 - 16.0 g/dL    HCT 38.2 35.0 - 45.0 %    MCV 85.5 74.0 - 97.0 FL    MCH 27.3 24.0 - 34.0 PG    MCHC 31.9 31.0 - 37.0 g/dL    RDW 16.0 (H) 11.6 - 14.5 %    PLATELET 524 013 - 160 K/uL    MPV 13.0 (H) 9.2 - 11.8 FL    NEUTROPHILS 53 40 - 73 %    LYMPHOCYTES 38 21 - 52 %    MONOCYTES 7 3 - 10 %    EOSINOPHILS 2 0 - 5 %    BASOPHILS 0 0 - 2 %    ABS. NEUTROPHILS 4.0 1.8 - 8.0 K/UL    ABS.  LYMPHOCYTES 2.9 0.9 - 3.6 K/UL ABS. MONOCYTES 0.6 0.05 - 1.2 K/UL    ABS. EOSINOPHILS 0.2 0.0 - 0.4 K/UL    ABS. BASOPHILS 0.0 0.0 - 0.1 K/UL    DF AUTOMATED     METABOLIC PANEL, BASIC    Collection Time: 02/11/20  7:52 PM   Result Value Ref Range    Sodium 143 136 - 145 mmol/L    Potassium 3.7 3.5 - 5.5 mmol/L    Chloride 105 100 - 111 mmol/L    CO2 30 21 - 32 mmol/L    Anion gap 8 3.0 - 18 mmol/L    Glucose 100 (H) 74 - 99 mg/dL    BUN 16 7.0 - 18 MG/DL    Creatinine 1.02 0.6 - 1.3 MG/DL    BUN/Creatinine ratio 16 12 - 20      GFR est AA >60 >60 ml/min/1.73m2    GFR est non-AA 57 (L) >60 ml/min/1.73m2    Calcium 9.2 8.5 - 10.1 MG/DL   CARDIAC PANEL,(CK, CKMB & TROPONIN)    Collection Time: 02/11/20  7:52 PM   Result Value Ref Range     (H) 26 - 192 U/L    CK - MB <1.0 <3.6 ng/ml    CK-MB Index  0.0 - 4.0 %     CALCULATION NOT PERFORMED WHEN RESULT IS BELOW LINEAR LIMIT    Troponin-I, QT <0.02 0.0 - 0.045 NG/ML   LIPASE    Collection Time: 02/11/20  7:52 PM   Result Value Ref Range    Lipase 135 73 - 393 U/L           X-Ray, CT or other radiology findings or impressions:  XR CHEST PORT   Final Result   IMPRESSION:      No radiographic finding for an acute cardiopulmonary process. Progress notes, Consult notes or additional Procedure notes:   abnl ekg, subtle change from prior, h/o cabg, no prov testing since 2016. to admit  9:38 PM d/w dr Leonidas Abel in ed, agrees w admit, admit to  [facility]  9:51 PM d/w anitha Hernandez, to consult, agrees w lovenox  Pt pain free      Diagnosis:   1. Chest pain, unspecified type        Disposition: admit    Follow-up Information    None          Patient's Medications   Start Taking    No medications on file   Continue Taking    ASPIRIN DELAYED-RELEASE 81 MG TABLET    Take 1 Tab by mouth daily. DULOXETINE (CYMBALTA) 60 MG CAPSULE    Take 1 Cap by mouth daily. GABAPENTIN (NEURONTIN) 300 MG CAPSULE    Take 300 mg by mouth.  Take 2 tablet by mouth in the morning, take 1 tablet by mouth at noon, take 1 tablet by mouth at 6:00PM, and take 2 tablets by mouth at bedtime. LIDOCAINE (LIDODERM) 5 %    APPLY 1 NEW PATCH TO SKIN FOR LEFT DORSAL PAIN UP TO 3 PATCHES EACH TIME Q 12 HOURS PRN. MAGNESIUM OXIDE (MAG-OX) 400 MG TABLET    TAKE 1 TABLET BY MOUTH ONCE DAILY    METOPROLOL TARTRATE (LOPRESSOR) 25 MG TABLET    Take 0.5 Tabs by mouth two (2) times a day. MULTIVITAMIN (ONE A DAY) TABLET    Take 1 Tab by mouth daily. NITROGLYCERIN (NITROSTAT) 0.4 MG SL TABLET    DISSOLVE ONE TABLET UNDER THE TONGUE AT THE START OF CHEST PAIN EVERY 5 MINUTES UP TO 3 DOSES    OLMESARTAN-HYDROCHLOROTHIAZIDE (BENICAR HCT) 20-12.5 MG PER TABLET    TAKE 1 TABLET BY MOUTH ONCE DAILY    OMEGA-3 ACID ETHYL ESTERS (LOVAZA) 1 GRAM CAPSULE    Take 1 Cap by mouth nightly.    These Medications have changed    No medications on file   Stop Taking    No medications on file

## 2020-02-12 NOTE — PROGRESS NOTES
Discussed care with Dr. Tegan Jones. Patient has ruled out for MI. ECG with chronic nonspecific ST wave abnormalities. If patient remains chest pain free reasonable to discharge from ER with plans on outpatient nuclear stress test. Patient is scheduled for outpatient nuclear stress test tomorrow 2/13/2020 at 730 AM at [facility].     FABY Junior

## 2020-02-12 NOTE — ED NOTES
Pt notified by cardiology to schedule stress test in the AM, Dr Carlos Nelson in for reassessment and pt given verbal instructions by provider. I have reviewed verbal discharge instructions with the patient. The patient verbalized understanding. Pt  dc'd via ambulation with belongings. Patient armband removed and shredded.

## 2020-02-13 ENCOUNTER — TELEPHONE (OUTPATIENT)
Dept: CARDIOLOGY CLINIC | Age: 50
End: 2020-02-13

## 2020-02-13 ENCOUNTER — HOSPITAL ENCOUNTER (OUTPATIENT)
Dept: NON INVASIVE DIAGNOSTICS | Age: 50
Discharge: HOME | End: 2020-02-13
Attending: NURSE PRACTITIONER
Payer: COMMERCIAL

## 2020-02-13 VITALS
HEIGHT: 63 IN | WEIGHT: 272 LBS | DIASTOLIC BLOOD PRESSURE: 80 MMHG | BODY MASS INDEX: 48.2 KG/M2 | SYSTOLIC BLOOD PRESSURE: 120 MMHG

## 2020-02-13 DIAGNOSIS — I25.10 CORONARY ARTERY DISEASE INVOLVING NATIVE CORONARY ARTERY OF NATIVE HEART WITHOUT ANGINA PECTORIS: ICD-10-CM

## 2020-02-13 DIAGNOSIS — R94.31 ABNORMAL EKG: ICD-10-CM

## 2020-02-13 LAB
ATRIAL RATE: 54 BPM
ATRIAL RATE: 57 BPM
CALCULATED P AXIS, ECG09: 14 DEGREES
CALCULATED P AXIS, ECG09: 49 DEGREES
CALCULATED R AXIS, ECG10: 64 DEGREES
CALCULATED R AXIS, ECG10: 72 DEGREES
CALCULATED T AXIS, ECG11: 145 DEGREES
CALCULATED T AXIS, ECG11: 152 DEGREES
DIAGNOSIS, 93000: NORMAL
DIAGNOSIS, 93000: NORMAL
P-R INTERVAL, ECG05: 142 MS
P-R INTERVAL, ECG05: 160 MS
Q-T INTERVAL, ECG07: 406 MS
Q-T INTERVAL, ECG07: 422 MS
QRS DURATION, ECG06: 74 MS
QRS DURATION, ECG06: 92 MS
QTC CALCULATION (BEZET), ECG08: 395 MS
QTC CALCULATION (BEZET), ECG08: 400 MS
STRESS BASELINE DIAS BP: 80 MMHG
STRESS BASELINE HR: 55 BPM
STRESS BASELINE SYS BP: 120 MMHG
STRESS ESTIMATED WORKLOAD: 1 METS
STRESS EXERCISE DUR MIN: NORMAL
STRESS PEAK DIAS BP: 76 MMHG
STRESS PEAK SYS BP: 140 MMHG
STRESS PERCENT HR ACHIEVED: 51 %
STRESS POST PEAK HR: 86 BPM
STRESS RATE PRESSURE PRODUCT: NORMAL BPM*MMHG
STRESS ST DEPRESSION: 0 MM
STRESS ST ELEVATION: 0 MM
STRESS TARGET HR: 169 BPM
VENTRICULAR RATE, ECG03: 54 BPM
VENTRICULAR RATE, ECG03: 57 BPM

## 2020-02-13 PROCEDURE — 74011250636 HC RX REV CODE- 250/636: Performed by: NURSE PRACTITIONER

## 2020-02-13 PROCEDURE — 93017 CV STRESS TEST TRACING ONLY: CPT

## 2020-02-13 RX ORDER — SODIUM CHLORIDE 9 MG/ML
250 INJECTION, SOLUTION INTRAVENOUS ONCE
Status: COMPLETED | OUTPATIENT
Start: 2020-02-13 | End: 2020-02-13

## 2020-02-13 RX ORDER — OLMESARTAN MEDOXOMIL 20 MG/1
20 TABLET ORAL DAILY
Qty: 30 TAB | Refills: 5 | Status: SHIPPED | OUTPATIENT
Start: 2020-02-13 | End: 2020-03-02

## 2020-02-13 RX ADMIN — REGADENOSON 0.4 MG: 0.08 INJECTION, SOLUTION INTRAVENOUS at 09:10

## 2020-02-13 RX ADMIN — SODIUM CHLORIDE 250 ML/HR: 900 INJECTION, SOLUTION INTRAVENOUS at 09:10

## 2020-02-13 NOTE — PROGRESS NOTES
Patient was injected with 00.9 millicuries [id number] Sestamibi on 2/13/20 at 0735. Patient was injected with 06.5 millicuries [id number] Sestamibi on 2/13/20 at 0910. Patient's armbands were removed and placed in shred-it box.     Patient had a Nuclear Lexiscan Stress Test.

## 2020-02-13 NOTE — LETTER
NOTIFICATION OF RETURN TO WORK / SCHOOL 
 
2/13/2020 1:54 PM 
 
Ms. Jigar Ashton [address] 
[facility] [address] [place] To Whom It May Concern: 
 
Jigar Ashton was under the care of [facility]. She will be able to return to work on 02/14/2020. If there are questions or concerns please have the patient contact our office.  
 
Sincerely, 
 
 
Dulce Maria Bragg MD

## 2020-02-13 NOTE — TELEPHONE ENCOUNTER
Dr Aditya Wells reviewed stress test. Due to the patient symptoms of chest pain she will need to have left heart cath for completeness , not urgent       Patient aware of results and can return to work

## 2020-02-13 NOTE — TELEPHONE ENCOUNTER
Patient walked in office after being in [place] [address] overnight on 2/12/2020. Patient had cardiac stress test on this morning, patient is questioning what medications she were told to stop at the ER. Patient is asystematic at this time, blood pressure is 110/80, pulse is 56. Reviewed this information with Np David Dleaney. Verbal order and read back per Yariel Flower, NP  Change Benicar- hctz to Benicar 20mg once daily. Reviewed stress test with Dr. Ibis Morgan. This has been fully explained to the patient, who indicates understanding.

## 2020-02-14 ENCOUNTER — TELEPHONE (OUTPATIENT)
Dept: CARDIOLOGY CLINIC | Age: 50
End: 2020-02-14

## 2020-02-14 NOTE — TELEPHONE ENCOUNTER
2/24 . .. LVM for patient to call office. 2/19 . .. LVM again for patient to contact office. She no-showed for her appointment yesterday with Anita Almanzar also. Attempting to get her scheduled for a Left Cath with Dr. Aditya Wells. LVM for patient to contact me to get procedure scheduled. Depending on when she schedules, she may need to see Anita Almanzar for H&P update, as well as get Pre-Procedure instructions the same day. Angelina Green  Female, 45 y. o., 1970  Weight:   272 lb (123.4 kg)  MRN:   [id number]  Phone:   [phone] Yvette Jiménez)  PCP:   Antonio Márquez NP  Primary Cvg:   SWATHI/ELZA - NAP - CHOICE (POS II)  Last Appt With Me  Next Appt With Me  None  Next Appt  2/18/20 - Tushar Odom NP - CARDIO [facility]  Message   Received: Yesterday   Message Contents   Nelson Zamudio, BROCK Barraza.; Earline Cruz             Not urgent , will need left heart cath with Dr Aditya Wells due to abnormal stress and chest pain

## 2020-02-29 NOTE — PROGRESS NOTES
Rigoberto Walters presents today for follow-up and a pre-procedure history and physical.  She was seen by me on 2/3/20 for evaluation of abnormal EKG per her PCP. I requested a pharmacologic nuclear stress test during that visit. She presented to the ER on 2/11/20 with complaints of chest pain and we were consulted. She ruled out for MI with 3 negative sets of troponins < 0.02 and it was again recommended that she undergo a stress test.  Once the test was scheduled, she was discharged home from the ER. The stress test was completed on 2/13/20. It showed a moderate to large in size defect present in the basal-apical anterior, inferior and septal location(s) that is more pronounced with rest than stress imaging. It appeared to be artifact but a small amount of ischemia could not be excluded. Dr. Luis Carlos Foy reviewed the results of the stress shayy and recommended that she undergo cardiac catheterization. At one point, the HCT was discontinued from her Benicar HCT as she was hypotensive and since that time, she states that she has noticed increasing lower extremity edema, abdominal bloating, and weight gain. Her weight is 7 pounds up from her last appointment. She states that she drinks at least 6, 16 ounce bottles of water per day. She is a 45year old female with a history of CAD (s/p CABG x 1 in 2011), dyslipidemia, pleural effusion, and GERD. She presented to the ER on 11/20/19 with complaints of chest pain. Her cardiac enzymes/troponins were negative x 2 sets and she ruled out for MI. She declined admission and further work-up. Her last echo was done in July 2015 and it showed an EF of 50-55%, no WMA, mild concentric LVH, and PASP of 45-50 mmHg. Her last stress test was performed in Nov. 2016 and it showed a mostly fixed anteroapical defect with partial reversibility. There is no septal involvement. She was last seen by Dr. Luis Carlos Foy in March 2019.      Denies chest pain, tightness, heaviness, and palpitations. Denies shortness of breath at rest, dyspnea on exertion, orthopnea and PND. Denies abdominal bloating. Denies lightheadedness, dizziness, and syncope. Denies lower extremity edema and claudication. Denies nausea, vomiting, diarrhea, melena, hematochezia. Denies hematuria, urgency, frequency. Denies fever, chills. PMH:  Past Medical History:   Diagnosis Date    Abnormal PFT 10/1/2015    Dr. Yanna Schroeder, Pulmonology    Acid reflux     CABG x 1 2011    LIMA-LAD    CAD (coronary artery disease)     Cardiac cath 2016    R dom. oLAD 85%. Otherwise patent coronaries. HECTOR to LAD patent. LVEDP 22 mmHg. EF 55%. No RWMA.  Cardiac echocardiogram 2015    Tech difficult. EF 50-55%. No WMA. Mild conc LVH. Gr 2 DDfx. RVSP 45-50 mmHg. Mild LAE. Mild ROSITA. Mod TR. Mild IVCE.  Cardiac nuclear imaging test, high risk 2016    High risk. Mainly fixed anteroapical defect w/partial reversibility. EF 60%. No RWMA. Nondiagnostic EKG on pharm stress test.    Cardiovascular upper extremity arterial duplex 2014    No significant occlusive arterial disease bilaterally.     Coronary atherosclerosis of native coronary artery     Diabetes mellitus ([facility] 14    hgbA1C 6.8    Diabetic eye exam ([facility]     Dyslipidemia     GERD (gastroesophageal reflux disease)     H/O screening mammography 2016    no evidence of malignancy    Hypercholesterolemia     Hypomagnesemia 14    1.4    Pleural effusion, left     s/p CABG and thoracentesis with removal of 900 mL    Pulmonary arterial hypertension (HCC)        PSH:  Past Surgical History:   Procedure Laterality Date    CARDIAC CATHETERIZATION  2016         CARDIAC SURG PROCEDURE UNLIST      CORONARY ARTERY ANGIOGRAM  2016         HX  SECTION      HX CORONARY ARTERY BYPASS GRAFT  12/31/2011    x1    LV ANGIOGRAPHY  2016            MEDS:  Current Outpatient Medications   Medication Sig    metoprolol tartrate (LOPRESSOR) 25 mg tablet Take 0.5 Tabs by mouth two (2) times a day.  gabapentin (NEURONTIN) 300 mg capsule 2 caps QAM, 1 cap at noon, 1 cap at dinner, and 2 caps at HS    magnesium oxide (MAG-OX) 400 mg tablet Take 1 Tab by mouth daily.  olmesartan (BENICAR) 20 mg tablet Take 1 Tab by mouth daily.  lidocaine (LIDODERM) 5 % APPLY 1 NEW PATCH TO SKIN FOR LEFT DORSAL PAIN UP TO 3 PATCHES EACH TIME Q 12 HOURS PRN.  nitroglycerin (NITROSTAT) 0.4 mg SL tablet DISSOLVE ONE TABLET UNDER THE TONGUE AT THE START OF CHEST PAIN EVERY 5 MINUTES UP TO 3 DOSES    aspirin delayed-release 81 mg tablet Take 1 Tab by mouth daily.  DULoxetine (CYMBALTA) 60 mg capsule Take 1 Cap by mouth daily.  multivitamin (ONE A DAY) tablet Take 1 Tab by mouth daily.  omega-3 acid ethyl esters (LOVAZA) 1 gram capsule Take 1 Cap by mouth nightly. No current facility-administered medications for this visit. Allergies and Sensitivities:  Allergies   Allergen Reactions    Contrave [Naltrexone-Bupropion] Other (comments)     Severe constipation    Metformin Nausea Only       Family History:  Family History   Problem Relation Age of Onset    Diabetes Mother     Hypertension Father     Alzheimer Father 61       Social History:  She  reports that she has never smoked. She has never used smokeless tobacco.  She  reports no history of alcohol use. Physical:  Visit Vitals  /80 (BP 1 Location: Left arm, BP Patient Position: Sitting)   Pulse (!) 59   Ht 5' 3\" (1.6 m)   Wt 126.6 kg (279 lb)   SpO2 96%   BMI 49.42 kg/m²           Exam:  Neck:  Supple, no JVD, no carotid bruits  CV:  Normal S1 and  S2, no murmurs, rubs, or gallops noted  Lungs:  Clear to ausculation throughout, no wheezes or rales  Abd:  Soft, non-tender, obese, with good bowel sounds.   No hepatosplenomegaly  Extremities:  1+ lower extremity edema      Data:  EKG:         LABS:  Lab Results Component Value Date/Time    Sodium 144 02/12/2020 10:01 AM    Potassium 3.6 02/12/2020 10:01 AM    Chloride 108 02/12/2020 10:01 AM    CO2 28 02/12/2020 10:01 AM    Glucose 111 (H) 02/12/2020 10:01 AM    BUN 14 02/12/2020 10:01 AM    Creatinine 0.91 02/12/2020 10:01 AM     Lab Results   Component Value Date/Time    Cholesterol, total 261 (H) 01/28/2020 08:21 AM    HDL Cholesterol 60 01/28/2020 08:21 AM    LDL, calculated 180.6 (H) 01/28/2020 08:21 AM    Triglyceride 102 01/28/2020 08:21 AM    CHOL/HDL Ratio 4.4 01/28/2020 08:21 AM     Lab Results   Component Value Date/Time    ALT (SGPT) 55 01/28/2020 08:21 AM         Impression/Plan:  1.  CAD, s/p CABG x 1 on 12/31/11  2. Hypertension, blood pressure controlled  3. Dyslipidemia, on Lovaza  4. Chest pain  5. Obesity    Mrs. Meg Meza presents today for follow-up and a pre-procedure history and physical.  She underwent a pharmacologic nuclear stress test that was considered abnormal.   She is scheduled for a cardiac catheterization on 3/12/20. The procedure was discussed with her and her , her questions were answered, and she is ready to proceed. Pre-procedure instructions were reviewed with them by one of our nurses. She complains of increasing edema, weight gain, and abdominal bloating since the HCTZ was discontinued (was on Benicar HCT). Her weight is up 7 pounds since her last visit. She states that she drinks at least 6, 16 ounce bottles of water per day. She was asked to decrease her intake of fluids to no more than 64 ounces per day. At this time, she will be switched back to Benicar HCT 20/12.5 daily. She will monitor her blood pressures at home and I asked that she call us if her systolic blood pressure is consistently below 110 mmHg. She will follow-up with Dr. Ladi Gutierrez as scheduled and PRN.         Johnathon Parnell MSN, FNP-BC    Please note:  Portions of this chart were created with Dragon medical speech to text program. Unrecognized errors may be present.

## 2020-03-02 ENCOUNTER — OFFICE VISIT (OUTPATIENT)
Dept: CARDIOLOGY CLINIC | Age: 50
End: 2020-03-02

## 2020-03-02 VITALS
BODY MASS INDEX: 49.43 KG/M2 | WEIGHT: 279 LBS | DIASTOLIC BLOOD PRESSURE: 80 MMHG | HEART RATE: 59 BPM | SYSTOLIC BLOOD PRESSURE: 140 MMHG | HEIGHT: 63 IN | OXYGEN SATURATION: 96 %

## 2020-03-02 DIAGNOSIS — E78.5 DYSLIPIDEMIA: ICD-10-CM

## 2020-03-02 DIAGNOSIS — R94.31 ABNORMAL EKG: ICD-10-CM

## 2020-03-02 DIAGNOSIS — R94.39 ABNORMAL NUCLEAR STRESS TEST: ICD-10-CM

## 2020-03-02 DIAGNOSIS — R07.9 CHEST PAIN, UNSPECIFIED TYPE: Primary | ICD-10-CM

## 2020-03-02 DIAGNOSIS — I25.10 CORONARY ARTERY DISEASE INVOLVING NATIVE CORONARY ARTERY OF NATIVE HEART WITHOUT ANGINA PECTORIS: Primary | ICD-10-CM

## 2020-03-02 RX ORDER — GABAPENTIN 300 MG/1
300 CAPSULE ORAL
COMMUNITY
End: 2020-03-02

## 2020-03-02 RX ORDER — GABAPENTIN 300 MG/1
CAPSULE ORAL
Qty: 1 CAP | Refills: 0
Start: 2020-03-02

## 2020-03-02 RX ORDER — OLMESARTAN MEDOXOMIL AND HYDROCHLOROTHIAZIDE 20/12.5 20; 12.5 MG/1; MG/1
1 TABLET ORAL DAILY
Qty: 30 TAB | Refills: 6 | Status: SHIPPED | OUTPATIENT
Start: 2020-03-02 | End: 2020-11-24 | Stop reason: SDUPTHER

## 2020-03-02 RX ORDER — METOPROLOL TARTRATE 25 MG/1
12.5 TABLET, FILM COATED ORAL 2 TIMES DAILY
Qty: 1 TAB | Refills: 0
Start: 2020-03-02 | End: 2021-02-22 | Stop reason: SDUPTHER

## 2020-03-02 RX ORDER — METOPROLOL TARTRATE 25 MG/1
TABLET, FILM COATED ORAL
COMMUNITY
Start: 2020-02-14 | End: 2020-03-02

## 2020-03-02 RX ORDER — GABAPENTIN 300 MG/1
300 CAPSULE ORAL
COMMUNITY
Start: 2020-02-19 | End: 2020-03-02

## 2020-03-02 NOTE — PATIENT INSTRUCTIONS
Instructions Patients Name:  Nazia Rodriguez 1. You are scheduled to have a Heart Catheterization on March 12, 2020  at 1030 am.  Please check in at 0930 am  . 2. Please go to [facility] and park in the outpatient parking lot that is located around to the back of the hospital and enter through the [facility] building. Once you enter through the [facility] check in with the  there. The  will either give you directions or assist you in getting to the cath holding area. 3. You are not to eat or drink anything after midnight the night before your procedure. Small sips of water to take your medications is ok. 4. If you are diabetic, do not take your insulin/sugar pill the morning of the procedure. 5. MEDICATION INSTRUCTIONS:   Please take your morning medications with the following special instructions: 
 
[x]          Please make sure to take your Blood pressure medication :  
[x]          Take your Aspirin . 6. We encourage families to wait in the waiting room on the first floor while the procedure is being done. The Doctor will come out and talk with you as soon as the procedure is over. 7. There is the possibility that you may spend the night in the hospital, depending on the results of the procedure. This will be determined after the procedure is done. If angioplasty or stent is planned, you will stay at least one day. 8. If you or your family have any questions, please call our office Monday Friday, 9:00 a. m.4:30 p.m.,  At [phone], and ask to speak to one of the nurses.

## 2020-03-02 NOTE — PATIENT INSTRUCTIONS
Restart Benicar HCT 20/12.5 once a day All other medications to remain the same Monitor blood pressures at home and if blood pressures begin to consistently stay low (less than 300 mmHg systolic), please call the office Cardiac cath as scheduled Follow-up with Dr Isabella Adame as scheduled and as needed Decrease fluid intake to no more 64 ounces per day

## 2020-03-02 NOTE — PROGRESS NOTES
Anitamyriam Josh presents today for   Chief Complaint   Patient presents with   [address] preferred language for health care discussion is english/other. Is someone accompanying this pt? family    Is the patient using any DME equipment during [address]? no    Depression Screening:  3 most recent PHQ Screens 3/2/2020   Little interest or pleasure in doing things Not at all   Feeling down, depressed, irritable, or hopeless More than half the days   Total Score PHQ 2 2       Learning Assessment:  Learning Assessment 3/17/2017   PRIMARY LEARNER Patient   HIGHEST LEVEL OF EDUCATION - PRIMARY LEARNER  -   BARRIERS PRIMARY LEARNER -   CO-LEARNER CAREGIVER -   PRIMARY LANGUAGE ENGLISH   LEARNER PREFERENCE PRIMARY DEMONSTRATION   ANSWERED BY patient   RELATIONSHIP SELF       Abuse Screening:  Abuse Screening Questionnaire 1/30/2018   Do you ever feel afraid of your partner? N   Are you in a relationship with someone who physically or mentally threatens you? N   Is it safe for you to go home? Y       Fall Risk  No flowsheet data found. Pt currently taking Anticoagulant therapy? ASA 81 mg    Coordination of Care:  1. Have you been to the ER, urgent care clinic since your last visit? Hospitalized since your last visit? Yes, 02-    2. Have you seen or consulted any other health care providers outside of the [address] [place] [address] since your last visit? Include any pap smears or colon screening.  no

## 2020-03-03 ENCOUNTER — HOSPITAL ENCOUNTER (OUTPATIENT)
Dept: PREADMISSION TESTING | Age: 50
Discharge: HOME | End: 2020-03-03
Payer: COMMERCIAL

## 2020-03-03 DIAGNOSIS — R07.9 CHEST PAIN, UNSPECIFIED TYPE: ICD-10-CM

## 2020-03-03 LAB
ALBUMIN SERPL-MCNC: 3.5 G/DL (ref 3.4–5)
ALBUMIN/GLOB SERPL: 1 {RATIO} (ref 0.8–1.7)
ALP SERPL-CCNC: 196 U/L (ref 45–117)
ALT SERPL-CCNC: 82 U/L (ref 13–56)
ANION GAP SERPL CALC-SCNC: 6 MMOL/L (ref 3–18)
AST SERPL-CCNC: 49 U/L (ref 10–38)
BASOPHILS # BLD: 0 K/UL (ref 0–0.1)
BASOPHILS NFR BLD: 0 % (ref 0–2)
BILIRUB SERPL-MCNC: 0.4 MG/DL (ref 0.2–1)
BUN SERPL-MCNC: 18 MG/DL (ref 7–18)
BUN/CREAT SERPL: 18 (ref 12–20)
CALCIUM SERPL-MCNC: 8.9 MG/DL (ref 8.5–10.1)
CHLORIDE SERPL-SCNC: 114 MMOL/L (ref 100–111)
CO2 SERPL-SCNC: 27 MMOL/L (ref 21–32)
CREAT SERPL-MCNC: 1 MG/DL (ref 0.6–1.3)
DIFFERENTIAL METHOD BLD: ABNORMAL
EOSINOPHIL # BLD: 0.1 K/UL (ref 0–0.4)
EOSINOPHIL NFR BLD: 2 % (ref 0–5)
ERYTHROCYTE [DISTWIDTH] IN BLOOD BY AUTOMATED COUNT: 16.2 % (ref 11.6–14.5)
GLOBULIN SER CALC-MCNC: 3.5 G/DL (ref 2–4)
GLUCOSE SERPL-MCNC: 106 MG/DL (ref 74–99)
HCT VFR BLD AUTO: 33.7 % (ref 35–45)
HGB BLD-MCNC: 11 G/DL (ref 12–16)
INR PPP: 1.2 (ref 0.8–1.2)
LYMPHOCYTES # BLD: 1.6 K/UL (ref 0.9–3.6)
LYMPHOCYTES NFR BLD: 30 % (ref 21–52)
MCH RBC QN AUTO: 27.5 PG (ref 24–34)
MCHC RBC AUTO-ENTMCNC: 32.6 G/DL (ref 31–37)
MCV RBC AUTO: 84.3 FL (ref 74–97)
MONOCYTES # BLD: 0.4 K/UL (ref 0.05–1.2)
MONOCYTES NFR BLD: 7 % (ref 3–10)
NEUTS SEG # BLD: 3.2 K/UL (ref 1.8–8)
NEUTS SEG NFR BLD: 61 % (ref 40–73)
PLATELET # BLD AUTO: 126 K/UL (ref 135–420)
POTASSIUM SERPL-SCNC: 4.2 MMOL/L (ref 3.5–5.5)
PROT SERPL-MCNC: 7 G/DL (ref 6.4–8.2)
PROTHROMBIN TIME: 15.2 SEC (ref 11.5–15.2)
RBC # BLD AUTO: 4 M/UL (ref 4.2–5.3)
SODIUM SERPL-SCNC: 147 MMOL/L (ref 136–145)
WBC # BLD AUTO: 5.4 K/UL (ref 4.6–13.2)

## 2020-03-03 PROCEDURE — 80053 COMPREHEN METABOLIC PANEL: CPT

## 2020-03-03 PROCEDURE — 85025 COMPLETE CBC W/AUTO DIFF WBC: CPT

## 2020-03-03 PROCEDURE — 36415 COLL VENOUS BLD VENIPUNCTURE: CPT

## 2020-03-03 PROCEDURE — 85610 PROTHROMBIN TIME: CPT

## 2020-03-12 ENCOUNTER — HOSPITAL ENCOUNTER (OUTPATIENT)
Age: 50
Setting detail: OUTPATIENT SURGERY
Discharge: HOME | End: 2020-03-12
Attending: INTERNAL MEDICINE | Admitting: INTERNAL MEDICINE
Payer: COMMERCIAL

## 2020-03-12 VITALS
RESPIRATION RATE: 18 BRPM | DIASTOLIC BLOOD PRESSURE: 78 MMHG | HEIGHT: 63 IN | HEART RATE: 54 BPM | SYSTOLIC BLOOD PRESSURE: 143 MMHG | BODY MASS INDEX: 48.2 KG/M2 | OXYGEN SATURATION: 97 % | WEIGHT: 272 LBS

## 2020-03-12 DIAGNOSIS — R94.39 ABNORMAL NUCLEAR STRESS TEST: ICD-10-CM

## 2020-03-12 DIAGNOSIS — R07.9 CHEST PAIN: ICD-10-CM

## 2020-03-12 PROCEDURE — 74011250636 HC RX REV CODE- 250/636: Performed by: INTERNAL MEDICINE

## 2020-03-12 PROCEDURE — 77030004558 HC CATH ANGI DX SUPR TORQ CARD -A: Performed by: INTERNAL MEDICINE

## 2020-03-12 PROCEDURE — 93455 CORONARY ART/GRFT ANGIO S&I: CPT | Performed by: INTERNAL MEDICINE

## 2020-03-12 PROCEDURE — 77030013797 HC KT TRNSDUC PRSSR EDWD -A: Performed by: INTERNAL MEDICINE

## 2020-03-12 PROCEDURE — 74011636320 HC RX REV CODE- 636/320: Performed by: INTERNAL MEDICINE

## 2020-03-12 PROCEDURE — 74011000250 HC RX REV CODE- 250: Performed by: INTERNAL MEDICINE

## 2020-03-12 PROCEDURE — 99152 MOD SED SAME PHYS/QHP 5/>YRS: CPT | Performed by: INTERNAL MEDICINE

## 2020-03-12 PROCEDURE — 74011250637 HC RX REV CODE- 250/637: Performed by: INTERNAL MEDICINE

## 2020-03-12 PROCEDURE — 99153 MOD SED SAME PHYS/QHP EA: CPT | Performed by: INTERNAL MEDICINE

## 2020-03-12 PROCEDURE — C1894 INTRO/SHEATH, NON-LASER: HCPCS | Performed by: INTERNAL MEDICINE

## 2020-03-12 RX ORDER — LIDOCAINE HYDROCHLORIDE 10 MG/ML
INJECTION, SOLUTION EPIDURAL; INFILTRATION; INTRACAUDAL; PERINEURAL AS NEEDED
Status: DISCONTINUED | OUTPATIENT
Start: 2020-03-12 | End: 2020-03-12 | Stop reason: HOSPADM

## 2020-03-12 RX ORDER — GUAIFENESIN 100 MG/5ML
LIQUID (ML) ORAL
Status: DISCONTINUED
Start: 2020-03-12 | End: 2020-03-12 | Stop reason: HOSPADM

## 2020-03-12 RX ORDER — GUAIFENESIN 100 MG/5ML
81 LIQUID (ML) ORAL ONCE
Status: COMPLETED | OUTPATIENT
Start: 2020-03-12 | End: 2020-03-12

## 2020-03-12 RX ORDER — FENTANYL CITRATE 50 UG/ML
INJECTION, SOLUTION INTRAMUSCULAR; INTRAVENOUS AS NEEDED
Status: DISCONTINUED | OUTPATIENT
Start: 2020-03-12 | End: 2020-03-12 | Stop reason: HOSPADM

## 2020-03-12 RX ORDER — MIDAZOLAM HYDROCHLORIDE 1 MG/ML
INJECTION, SOLUTION INTRAMUSCULAR; INTRAVENOUS AS NEEDED
Status: DISCONTINUED | OUTPATIENT
Start: 2020-03-12 | End: 2020-03-12 | Stop reason: HOSPADM

## 2020-03-12 RX ORDER — SODIUM CHLORIDE 0.9 % (FLUSH) 0.9 %
5-40 SYRINGE (ML) INJECTION EVERY 8 HOURS
Status: DISCONTINUED | OUTPATIENT
Start: 2020-03-12 | End: 2020-03-12 | Stop reason: HOSPADM

## 2020-03-12 RX ORDER — SODIUM CHLORIDE 0.9 % (FLUSH) 0.9 %
5-40 SYRINGE (ML) INJECTION AS NEEDED
Status: DISCONTINUED | OUTPATIENT
Start: 2020-03-12 | End: 2020-03-12 | Stop reason: HOSPADM

## 2020-03-12 RX ADMIN — ASPIRIN 81 MG 81 MG: 81 TABLET ORAL at 10:30

## 2020-03-12 NOTE — PROGRESS NOTES
Right FAS aspirated and pulled @ 1145, by JT. Pressure held for 20 min. Sterile hemostatic dressing applied. No bleeding or swelling. Safety instructions reviewed with the patient.

## 2020-03-12 NOTE — PROGRESS NOTES
Cath holding summary    Patient escorted to cath holding from waiting area ambulatory, alert and oriented x 4, voicing no complaints. Changed into gown and placed on monitor. .  NPO since MN. Lab results, med rec and H&P reviewed on chart. PIV x 2 inserted without difficulty. Family to bedside.

## 2020-03-12 NOTE — Clinical Note
Bilateral groin clipped, prepped with ChloraPrep and draped. Wet prep solution applied at: 1039. Wet prep solution dried at: 1042. Wet prep elapsed drying time: 3 mins.

## 2020-03-12 NOTE — H&P
Addendum:    Patient with known history of CAD and chest pains. She had nuclear scan done on 2/13/2020 which showed the following:    · Baseline ECG: Sinus bradycardia, ischemic ST-T wave abnormalitiesT wave abnormality consider inferior ischemia. · Gated SPECT: Left ventricular function post-stress was abnormal. Calculated ejection fraction is 40%. The TID ratio is 0.88. · Left ventricular perfusion is abnormal.  · Myocardial perfusion imaging defect 1: There is a defect that is moderate to large in size present in the basal-apical anterior, inferior and septal location(s) that is more pronounced with rest than stress imaging. The defect appears to be an artifact. · Myocardial perfusion imaging supports an intermediate risk stress test due to EF of 40%. Anterior, septal, and inferior perfusion defect improves during stress suggesting artifact. A mild amount of ischemia cannot be excluded. We will proceed with coronary angiography. [facility]  3/12/2020      Leonardo Mitchell presents today for follow-up and a pre-procedure history and physical.  She was seen by me on 2/3/20 for evaluation of abnormal EKG per her PCP. I requested a pharmacologic nuclear stress test during that visit. She presented to the ER on 2/11/20 with complaints of chest pain and we were consulted. She ruled out for MI with 3 negative sets of troponins < 0.02 and it was again recommended that she undergo a stress test.  Once the test was scheduled, she was discharged home from the ER. The stress test was completed on 2/13/20. It showed a moderate to large in size defect present in the basal-apical anterior, inferior and septal location(s) that is more pronounced with rest than stress imaging. It appeared to be artifact but a small amount of ischemia could not be excluded. Dr. Lupe Gutierrez reviewed the results of the stress shayy and recommended that she undergo cardiac catheterization.   At one point, the HCT was discontinued from her Benicar HCT as she was hypotensive and since that time, she states that she has noticed increasing lower extremity edema, abdominal bloating, and weight gain. Her weight is 7 pounds up from her last appointment. She states that she drinks at least 6, 16 ounce bottles of water per day.     She is a 45year old female with a history of CAD (s/p CABG x 1 in 2011), dyslipidemia, pleural effusion, and GERD. She presented to the ER on 11/20/19 with complaints of chest pain. Her cardiac enzymes/troponins were negative x 2 sets and she ruled out for MI. She declined admission and further work-up. Her last echo was done in July 2015 and it showed an EF of 50-55%, no WMA, mild concentric LVH, and PASP of 45-50 mmHg. Her last stress test was performed in Nov. 2016 and it showed a mostly fixed anteroapical defect with partial reversibility. There is no septal involvement. She was last seen by Dr. Piotr Montalvo in March 2019.      Denies chest pain, tightness, heaviness, and palpitations. Denies shortness of breath at rest, dyspnea on exertion, orthopnea and PND. Denies abdominal bloating. Denies lightheadedness, dizziness, and syncope. Denies lower extremity edema and claudication. Denies nausea, vomiting, diarrhea, melena, hematochezia. Denies hematuria, urgency, frequency. Denies fever, chills.          PMH:       Past Medical History:   Diagnosis Date    Abnormal PFT 10/1/2015     Dr. Brenda Schroeder, Pulmonology    Acid reflux      CABG x 1 12/31/2011     LIMA-LAD    CAD (coronary artery disease)      Cardiac cath 11/23/2016     R dom. oLAD 85%. Otherwise patent coronaries. HECTOR to LAD patent. LVEDP 22 mmHg. EF 55%. No RWMA.  Cardiac echocardiogram 07/28/2015     Tech difficult. EF 50-55%. No WMA. Mild conc LVH. Gr 2 DDfx. RVSP 45-50 mmHg. Mild LAE. Mild ROSITA. Mod TR. Mild IVCE.  Cardiac nuclear imaging test, high risk 11/23/2016     High risk.   Mainly fixed anteroapical defect w/partial reversibility. EF 60%. No RWMA. Nondiagnostic EKG on pharm stress test.    Cardiovascular upper extremity arterial duplex 2014     No significant occlusive arterial disease bilaterally.  Coronary atherosclerosis of native coronary artery      Diabetes mellitus ([facility] 14     hgbA1C 6.8    Diabetic eye exam ([facility]     Dyslipidemia      GERD (gastroesophageal reflux disease)      H/O screening mammography 2016     no evidence of malignancy    Hypercholesterolemia      Hypomagnesemia 14     1.4    Pleural effusion, left       s/p CABG and thoracentesis with removal of 900 mL    Pulmonary arterial hypertension (HCC)           PSH:        Past Surgical History:   Procedure Laterality Date    CARDIAC CATHETERIZATION   2016          CARDIAC SURG PROCEDURE UNLIST        CORONARY ARTERY ANGIOGRAM   2016          HX  SECTION        HX CORONARY ARTERY BYPASS GRAFT   12/31/2011     x1    LV ANGIOGRAPHY   2016               MEDS:       Current Outpatient Medications   Medication Sig    metoprolol tartrate (LOPRESSOR) 25 mg tablet Take 0.5 Tabs by mouth two (2) times a day.  gabapentin (NEURONTIN) 300 mg capsule 2 caps QAM, 1 cap at noon, 1 cap at dinner, and 2 caps at HS    magnesium oxide (MAG-OX) 400 mg tablet Take 1 Tab by mouth daily.  olmesartan (BENICAR) 20 mg tablet Take 1 Tab by mouth daily.  lidocaine (LIDODERM) 5 % APPLY 1 NEW PATCH TO SKIN FOR LEFT DORSAL PAIN UP TO 3 PATCHES EACH TIME Q 12 HOURS PRN.  nitroglycerin (NITROSTAT) 0.4 mg SL tablet DISSOLVE ONE TABLET UNDER THE TONGUE AT THE START OF CHEST PAIN EVERY 5 MINUTES UP TO 3 DOSES    aspirin delayed-release 81 mg tablet Take 1 Tab by mouth daily.  DULoxetine (CYMBALTA) 60 mg capsule Take 1 Cap by mouth daily.  multivitamin (ONE A DAY) tablet Take 1 Tab by mouth daily.     omega-3 acid ethyl esters (LOVAZA) 1 gram capsule Take 1 Cap by mouth nightly.      No current facility-administered medications for this visit.                Allergies and Sensitivities:        Allergies   Allergen Reactions    Contrave [Naltrexone-Bupropion] Other (comments)       Severe constipation    Metformin Nausea Only         Family History:        Family History   Problem Relation Age of Onset    Diabetes Mother      Hypertension Father      Alzheimer Father 61         Social History:  She  reports that she has never smoked. She has never used smokeless tobacco.  She  reports no history of alcohol use.        Physical:  Visit Vitals  /80 (BP 1 Location: Left arm, BP Patient Position: Sitting)   Pulse (!) 59   Ht 5' 3\" (1.6 m)   Wt 126.6 kg (279 lb)   SpO2 96%   BMI 49.42 kg/m²               Exam:  Neck:  Supple, no JVD, no carotid bruits  CV:  Normal S1 and  S2, no murmurs, rubs, or gallops noted  Lungs:  Clear to ausculation throughout, no wheezes or rales  Abd:  Soft, non-tender, obese, with good bowel sounds. No hepatosplenomegaly  Extremities:  1+ lower extremity edema        Data:  EKG:           LABS:        Lab Results   Component Value Date/Time     Sodium 144 02/12/2020 10:01 AM     Potassium 3.6 02/12/2020 10:01 AM     Chloride 108 02/12/2020 10:01 AM     CO2 28 02/12/2020 10:01 AM     Glucose 111 (H) 02/12/2020 10:01 AM     BUN 14 02/12/2020 10:01 AM     Creatinine 0.91 02/12/2020 10:01 AM            Lab Results   Component Value Date/Time     Cholesterol, total 261 (H) 01/28/2020 08:21 AM     HDL Cholesterol 60 01/28/2020 08:21 AM     LDL, calculated 180.6 (H) 01/28/2020 08:21 AM     Triglyceride 102 01/28/2020 08:21 AM     CHOL/HDL Ratio 4.4 01/28/2020 08:21 AM            Lab Results   Component Value Date/Time     ALT (SGPT) 55 01/28/2020 08:21 AM            Impression/Plan:  1.  CAD, s/p CABG x 1 on 12/31/11  2. Hypertension, blood pressure controlled  3. Dyslipidemia, on Lovaza  4. Chest pain  5.   Obesity     Mrs. Sanna Eddy presents today for follow-up and a pre-procedure history and physical.  She underwent a pharmacologic nuclear stress test that was considered abnormal.   She is scheduled for a cardiac catheterization on 3/12/20. The procedure was discussed with her and her , her questions were answered, and she is ready to proceed. Pre-procedure instructions were reviewed with them by one of our nurses.     She complains of increasing edema, weight gain, and abdominal bloating since the HCTZ was discontinued (was on Benicar HCT). Her weight is up 7 pounds since her last visit. She states that she drinks at least 6, 16 ounce bottles of water per day. She was asked to decrease her intake of fluids to no more than 64 ounces per day.      At this time, she will be switched back to Benicar HCT 20/12.5 daily. She will monitor her blood pressures at home and I asked that she call us if her systolic blood pressure is consistently below 110 mmHg.      She will follow-up with Dr. Gisela Iyer as scheduled and PRN.

## 2020-03-12 NOTE — ROUTINE PROCESS
A+Ox4, ambulatory without difficulty, denied and complaints. Right groin dressing intact, no bleeding or swelling. Discharge information reviewed. Escorted to car, tot her  for transport.

## 2020-03-12 NOTE — Clinical Note
Contrast Dose Calculator:   Patient's age: 45.   Patient's sex: Female. Patient weight (kg) = 123.4. Creatinine level (mg/dL) = 1. Creatinine clearance (mL/min): 129.66. Contrast concentration (mg/mL) = 300. MACD = 300 mL. Max Contrast dose per Creatinine Cl calculator = 291.73 mL.

## 2020-03-12 NOTE — Clinical Note
TRANSFER - IN REPORT:     Verbal report received from: Susan Faustin. Report consisted of patient's Situation, Background, Assessment and   Recommendations(SBAR). Opportunity for questions and clarification was provided. Assessment completed upon patient's arrival to unit and care assumed. Patient transported with a Registered Nurse.

## 2020-03-12 NOTE — PROGRESS NOTES
TRANSFER - OUT REPORT:    Verbal report given to [facility] RN  on Trinidad Fung  being transferred to [facility] for ordered procedure       Report consisted of patients Situation, Background, Assessment and   Recommendations(SBAR). Information from the following report(s) SBAR, Procedure Summary and MAR was reviewed with the receiving nurse. Lines:   Peripheral IV 03/12/20 Right Antecubital (Active)       Peripheral IV 03/12/20 Anterior;Left;Proximal Forearm (Active)        Opportunity for questions and clarification was provided.       Patient transported with:   Registered Nurse

## 2020-03-12 NOTE — Clinical Note
TRANSFER - OUT REPORT:     Verbal report given to: Nani Lindsey RN. Report consisted of patient's Situation, Background, Assessment and   Recommendations(SBAR). Opportunity for questions and clarification was provided. Patient transported with a Registered Nurse. Patient transported to: [address].

## 2020-03-12 NOTE — DISCHARGE INSTRUCTIONS
DISCHARGE SUMMARY from Nurse:    Please resume taking your home medications as prescribed. PATIENT INSTRUCTIONS:    After general anesthesia or intravenous sedation, for 24 hours or while taking prescription Narcotics:  · Limit your activities  · Do not drive and operate hazardous machinery  · Do not make important personal or business decisions  · Do  not drink alcoholic beverages  · If you have not urinated within 8 hours after discharge, please contact your surgeon on call. Report the following to your surgeon:  · Excessive pain, swelling, redness or odor of or around the surgical area  · Temperature over 100.5  · Nausea and vomiting lasting longer than 4 hours or if unable to take medications  · Any signs of decreased circulation or nerve impairment to extremity: change in color, persistent  numbness, tingling, coldness or increase pain  · Any questions    What to do at Home:  Recommended activity: No lifting, Driving, or Strenuous exercise for 24 hours. If you experience any of the following symptoms bleeding,swelling,acute pain or numbness, fever, please follow up with Dr.J. Elías Pereyra MD.    *  Please give a list of your current medications to your Primary Care Provider. *  Please update this list whenever your medications are discontinued, doses are      changed, or new medications (including over-the-counter products) are added. *  Please carry medication information at all times in case of emergency situations. These are general instructions for a healthy lifestyle:    No smoking/ No tobacco products/ Avoid exposure to second hand smoke  Surgeon General's Warning:  Quitting smoking now greatly reduces serious risk to your health.     Obesity, smoking, and sedentary lifestyle greatly increases your risk for illness    A healthy diet, regular physical exercise & weight monitoring are important for maintaining a healthy lifestyle    You may be retaining fluid if you have a history of heart failure or if you experience any of the following symptoms:  Weight gain of 3 pounds or more overnight or 5 pounds in a week, increased swelling in our hands or feet or shortness of breath while lying flat in bed. Please call your doctor as soon as you notice any of these symptoms; do not wait until your next office visit. The discharge information has been reviewed with the patient. The patient verbalized understanding. Discharge medications reviewed with the patient and appropriate educational materials and side effects teaching were provided. ___________________________________________________________________________________________________________________________________HEART CATHETERIZATION/ANGIOGRAPHY DISCHARGE INSTRUCTIONS    1. Check puncture site frequently for swelling or bleeding. If there is any bleeding, lie down and apply pressure over the area with a clean towel or washcloth. Notify your doctor for any redness, swelling, drainage, or oozing from the puncture site. Notify your doctor for any fever or chills. 2. If the extremity becomes cold, numb, or painful go to the Emergency Room. 3. Activity should be limited for the next 48 hours. Climb stairs as little as possible and avoid any stooping, bending, or strenuous activity for 48 hours. No heavy lifting (anything over 8 pounds) for 5 days. 4. You may resume your usual diet. Drink more fluids than usual.  5. Have a responsible person drive you home and stay with you for at least 24 hours after your heart catheterization/angiography. 6. You may remove bandage from your Groin in 24 hours. You may shower in 24 hours. No tub baths, hot tubs, or swimming for 1 week. Do not place any lotions, creams, powders, or ointments over puncture site for 1 week. You may place a clean band-aid over the puncture site each day for 5 days. Change daily. 7. If you take Metformin, do not take it for 48 hours.   8. Ask your nurse when to restart any blood thinners. How can you care for yourself at home? Activity  · Do not do strenuous exercise and do not lift, pull, or push anything heavy until your doctor says it is okay. This may be for a day or two. You can walk around the house and do light activity, such as cooking. · You may shower 24 to 48 hours after the procedure, if your doctor okays it. Pat the incision dry. Do not take a bath for 1 week, or until your doctor tells you it is okay. · If the catheter was placed in your groin, try not to walk up stairs for the first couple of days. · If the catheter was placed in your arm near your wrist, do not bend your wrist deeply for the first couple of days. Be careful using your hand to get into and out of a chair or bed. · If your doctor recommends it, get more exercise. Walking is a good choice. Bit by bit, increase the amount you walk every day. Try for at least 30 minutes on most days of the week. Diet  · Drink plenty of fluids to help your body flush out the dye. If you have kidney, heart, or liver disease and have to limit fluids, talk with your doctor before you increase the amount of fluids you drink. · Keep eating a heart-healthy diet that has lots of fruits, vegetables, and whole grains. If you have not been eating this way, talk to your doctor. You also may want to talk to a dietitian. This expert can help you to learn about healthy foods and plan meals. Medicines  · Your doctor will tell you if and when you can restart your medicines. He or she will also give you instructions about taking any new medicines. · If you take blood thinners, such as warfarin (Coumadin), clopidogrel (Plavix), or aspirin, be sure to talk to your doctor. He or she will tell you if and when to start taking those medicines again. Make sure that you understand exactly what your doctor wants you to do. · Your doctor may prescribe a blood-thinning medicine like aspirin or clopidogrel (Plavix).  It is very important that you take these medicines exactly as directed in order to keep the coronary artery open and reduce your risk of a heart attack. Be safe with medicines. Call your doctor if you think you are having a problem with your medicine. Care of the catheter site  · For the first 3 days, keep a bandage over the spot where the catheter was inserted. · Put ice or a cold pack on the area for 10 to 20 minutes at a time to help with soreness or swelling. Put a thin cloth between the ice and your skin. Sedation for a Medical Procedure: Care Instructions  Your Care Instructions  For a minor procedure or surgery, you will get a sedative to help you relax. This drug will make you sleepy. It is usually given in a vein (by IV). A shot may also be used to numb the area. If you had local anesthesia, you may feel some pain and discomfort as it wears off. If you have pain, don't be afraid to say so. Pain medicine works better if you take it before the pain gets bad. Common side effects from sedation include:  · Feeling sleepy. (Your doctors and nurses will make sure you are not too sleepy to go home.)  · Nausea and vomiting. This usually does not last long. · Feeling tired. Follow-up care is a key part of your treatment and safety. Be sure to make and go to all appointments, and call your doctor if you are having problems. It's also a good idea to know your test results and keep a list of the medicines you take. How can you care for yourself at home? Activity  · Don't do anything for 24 hours that requires attention to detail. It takes time for the medicine effects to completely wear off. · For your safety, you should not drive or operate any machinery that could be dangerous until the medicine wears off and you can think clearly and react easily. · Rest when you feel tired. Getting enough sleep will help you recover. Diet  · You can eat your normal diet, unless your doctor gives you other instructions.  If your stomach is upset, try clear liquids and bland, low-fat foods like plain toast or rice. · Drink plenty of fluids (unless your doctor tells you not to). · Don't drink alcohol for 24 hours. Medicines  · Be safe with medicines. Read and follow all instructions on the label. ¨ If the doctor gave you a prescription medicine for pain, take it as prescribed. ¨ If you are not taking a prescription pain medicine, ask your doctor if you can take an over-the-counter medicine. · If you think your pain medicine is making you sick to your stomach:  ¨ Take your medicine after meals (unless your doctor has told you not to). ¨ Ask your doctor for a different pain medicine. Follow-up care is a key part of your treatment and safety. Be sure to make and go to all appointments, and call your doctor if you are having problems. It's also a good idea to know your test results and keep a list of the medicines you take. When should you call for help? Call 911 anytime you think you may need emergency care. For example, call if:  · You passed out (lost consciousness). · You have severe trouble breathing. · You have sudden chest pain and shortness of breath, or you cough up blood. · You have symptoms of a heart attack. These may include:  ¨ Chest pain or pressure, or a strange feeling in the chest.  ¨ Sweating. ¨ Shortness of breath. ¨ Nausea or vomiting. ¨ Pain, pressure, or a strange feeling in the back, neck, jaw, or upper belly, or in one or both shoulders or arms. ¨ Lightheadedness or sudden weakness. ¨ A fast or irregular heartbeat. After you call 911, the  may tel you to chew 1 adult-strength or 2 to 4 low-dose aspirin. Wait for an ambulance. Do not try to drive yourself. · You have been diagnosed with angina, and you have symptoms that do not go away with rest or are not getting better within 5 minutes after you take a dose of nitroglycerin.   Call your doctor now or seek immediate medical care if:  · You are bleeding from the area where the catheter was put in your artery. · You have a fast-growing, painful lump at the catheter site. · You have signs of infection, such as:  ¨ Increased pain, swelling, warmth, or redness. ¨ Red streaks leading from the catheter site. ¨ Pus draining from the catheter site. ¨ A fever. · Your leg or arm looks blue or feels cold, numb, or tingly. These are general instructions for a healthy lifestyle:    No smoking/ No tobacco products/ Avoid exposure to second hand smoke    Surgeon General's Warning:  Quitting smoking now greatly reduces serious risk to your health. Obesity, smoking, and sedentary lifestyle greatly increases your risk for illness    A healthy diet, regular physical exercise & weight monitoring are important for maintaining a healthy lifestyle    You may be retaining fluid if you have a history of heart failure or if you experience any of the following symptoms:  Weight gain of 3 pounds or more overnight or 5 pounds in a week, increased swelling in our hands or feet or shortness of breath while lying flat in bed. Please call your doctor as soon as you notice any of these symptoms; do not wait until your next office visit. Recognize signs and symptoms of STROKE:    F-face looks uneven    A-arms unable to move or move unevenly    S-speech slurred or non-existent    T-time-call 911 as soon as signs and symptoms begin-DO NOT go       Back to bed or wait to see if you get better-TIME IS BRAIN. Warning Signs of HEART ATTACK     Call 911 if you have these symptoms:   Chest discomfort. Most heart attacks involve discomfort in the center of the chest that lasts more than a few minutes, or that goes away and comes back. It can feel like uncomfortable pressure, squeezing, fullness, or pain.  Discomfort in other areas of the upper body. Symptoms can include pain or discomfort in one or both arms, the back, neck, jaw, or stomach.    Shortness of breath with or without chest discomfort.  Other signs may include breaking out in a cold sweat, nausea, or lightheadedness. Don't wait more than five minutes to call 911 - MINUTES MATTER! Fast action can save your life. Calling 911 is almost always the fastest way to get lifesaving treatment. Emergency Medical Services staff can begin treatment when they arrive -- up to an hour sooner than if someone gets to the hospital by car. Patient armband removed and shredded  MyChart Activation    Thank you for requesting access to [employer]. Please follow the instructions below to securely access and download your online medical record. [employer] allows you to send messages to your doctor, view your test results, renew your prescriptions, schedule appointments, and more. How Do I Sign Up? 1. In your internet browser, go to [email or web address] 2. Click on the First Time User? Click Here link in the Sign In box. You will see the New Member Sign Up page. 3. Enter your [employer] Access Code exactly as it appears below. You will not need to use this code after youve completed the sign-up process. If you do not sign up before the expiration date, you must request a new code. [employer] Access Code: Activation code not generated  Current [employer] Status: Active (This is the date your [employer] access code will )    4. Enter the last four digits of your Social Security Number (xxxx) and Date of Birth (mm/dd/yyyy) as indicated and click Submit. You will be taken to the next sign-up page. 5. Create a [employer] ID. This will be your [employer] login ID and cannot be changed, so think of one that is secure and easy to remember. 6. Create a [employer] password. You can change your password at any time. 7. Enter your Password Reset Question and Answer. This can be used at a later time if you forget your password. 8. Enter your e-mail address. You will receive e-mail notification when new information is available in [address]   9. Click Sign Up. You can now view and download portions of your medical record. 10. Click the Download Summary menu link to download a portable copy of your medical information. Additional Information    If you have questions, please visit the Frequently Asked Questions section of the [employer] website at [email or web address] Remember, [employer] is NOT to be used for urgent needs. For medical emergencies, dial 911.

## 2020-04-30 DIAGNOSIS — I25.110 CORONARY ARTERY DISEASE INVOLVING NATIVE CORONARY ARTERY WITH UNSTABLE ANGINA PECTORIS (HCC): ICD-10-CM

## 2020-04-30 RX ORDER — ASPIRIN 81 MG/1
81 TABLET ORAL DAILY
Qty: 90 TAB | Refills: 3 | Status: SHIPPED | OUTPATIENT
Start: 2020-04-30 | End: 2020-11-25

## 2020-04-30 NOTE — TELEPHONE ENCOUNTER
Last Visit: 1/28/20 with ERICK Wells  Next Appointment: none  Previous Refill Encounter(s): 2/21/19 #90 with 3 refills    Requested Prescriptions     Pending Prescriptions Disp Refills    aspirin delayed-release 81 mg tablet 90 Tab 3     Sig: Take 1 Tab by mouth daily.

## 2020-05-21 DIAGNOSIS — G47.62 NOCTURNAL LEG CRAMPS: ICD-10-CM

## 2020-05-21 DIAGNOSIS — E83.42 HYPOMAGNESEMIA: ICD-10-CM

## 2020-05-21 NOTE — TELEPHONE ENCOUNTER
Last Visit: 1/28/20 with ERICK Wells  Next Appointment: none  Previous Refill Encounter(s): 2/21/20 #90    Requested Prescriptions     Pending Prescriptions Disp Refills    magnesium oxide (MAG-OX) 400 mg tablet 90 Tab 0     Sig: Take 1 Tab by mouth daily.

## 2020-05-22 RX ORDER — LANOLIN ALCOHOL/MO/W.PET/CERES
400 CREAM (GRAM) TOPICAL DAILY
Qty: 90 TAB | Refills: 0 | Status: SHIPPED | OUTPATIENT
Start: 2020-05-22 | End: 2020-08-21

## 2020-06-09 ENCOUNTER — VIRTUAL VISIT (OUTPATIENT)
Dept: FAMILY MEDICINE CLINIC | Age: 50
End: 2020-06-09

## 2020-06-09 DIAGNOSIS — Z12.11 SCREENING FOR COLON CANCER: ICD-10-CM

## 2020-06-09 DIAGNOSIS — Z12.39 BREAST CANCER SCREENING: ICD-10-CM

## 2020-06-09 DIAGNOSIS — I10 ESSENTIAL HYPERTENSION: Primary | ICD-10-CM

## 2020-06-09 NOTE — PROGRESS NOTES
Niraj Burdick is a 45 y.o. female who was seen by synchronous (real-time) audio-video technology on 6/9/2020. Consent: Niraj Burdick, who was seen by synchronous (real-time) audio-video technology, and/or her healthcare decision maker, is aware that this patient-initiated, Telehealth encounter on 6/9/2020 is a billable service, with coverage as determined by her insurance carrier. She is aware that she may receive a bill and has provided verbal consent to proceed: Yes. I am working from home. Patient is at her home. Subjective:   Niraj Burdick is a 45 y.o. female who was seen for a script for a mammogram.        Prior to Admission medications    Medication Sig Start Date End Date Taking? Authorizing Provider   magnesium oxide (MAG-OX) 400 mg tablet Take 1 Tab by mouth daily. 5/22/20   Sarah Singh MD   aspirin delayed-release 81 mg tablet Take 1 Tab by mouth daily. 4/30/20   Sarah Singh MD   metoprolol tartrate (LOPRESSOR) 25 mg tablet Take 0.5 Tabs by mouth two (2) times a day. 3/2/20   Daniel Epps NP   gabapentin (NEURONTIN) 300 mg capsule 2 caps QAM, 1 cap at noon, 1 cap at dinner, and 2 caps at HS 3/2/20   Daniel Epps NP   olmesartan-hydroCHLOROthiazide (BENICAR HCT) 20-12.5 mg per tablet Take 1 Tab by mouth daily. 3/2/20   Miranda Epps, NP   lidocaine (LIDODERM) 5 % APPLY 1 NEW PATCH TO SKIN FOR LEFT DORSAL PAIN UP TO 3 PATCHES EACH TIME Q 12 HOURS PRN. 1/25/20   Provider, Historical   nitroglycerin (NITROSTAT) 0.4 mg SL tablet DISSOLVE ONE TABLET UNDER THE TONGUE AT THE START OF CHEST PAIN EVERY 5 MINUTES UP TO 3 DOSES 11/21/19   Provider, Historical   DULoxetine (CYMBALTA) 60 mg capsule Take 1 Cap by mouth daily. 2/21/19   Darryn Wells, NP   multivitamin (ONE A DAY) tablet Take 1 Tab by mouth daily. 3/31/15   Neyda Kendrick, ERICK   omega-3 acid ethyl esters (LOVAZA) 1 gram capsule Take 1 Cap by mouth nightly. 5/14/14   Neyda Kendrick NP     Allergies   Allergen Reactions    Contrave [Naltrexone-Bupropion] Other (comments)     Severe constipation    Metformin Nausea Only       Patient Active Problem List    Diagnosis Date Noted    Type 2 diabetes with nephropathy ([facility] 02/23/2019    Type 2 diabetes mellitus with diabetic neuropathy ([facility] 08/04/2018    Pulmonary arterial hypertension ([facility]     Chest pain 11/23/2016    ACS (acute coronary syndrome) ([facility] 11/22/2016    Hypertension, goal below 140/80 09/26/2016    Gastroesophageal reflux disease without esophagitis 12/07/2015    Abnormal PFT 10/01/2015    Perceptive hearing loss, both sides 04/21/2015    Diabetes mellitus ([facility] 09/24/2014    Hypomagnesemia 08/18/2014    BMI 45.0-49.9, adult ([facility] 05/14/2014    Obesity 01/31/2012    Dyslipidemia 01/31/2012    CAD (coronary artery disease), native coronary artery 01/23/2012    S/P CABG x 1 01/23/2012     Current Outpatient Medications   Medication Sig Dispense Refill    magnesium oxide (MAG-OX) 400 mg tablet Take 1 Tab by mouth daily. 90 Tab 0    aspirin delayed-release 81 mg tablet Take 1 Tab by mouth daily. 90 Tab 3    metoprolol tartrate (LOPRESSOR) 25 mg tablet Take 0.5 Tabs by mouth two (2) times a day. 1 Tab 0    gabapentin (NEURONTIN) 300 mg capsule 2 caps QAM, 1 cap at noon, 1 cap at dinner, and 2 caps at HS 1 Cap 0    olmesartan-hydroCHLOROthiazide (BENICAR HCT) 20-12.5 mg per tablet Take 1 Tab by mouth daily. 30 Tab 6    lidocaine (LIDODERM) 5 % APPLY 1 NEW PATCH TO SKIN FOR LEFT DORSAL PAIN UP TO 3 PATCHES EACH TIME Q 12 HOURS PRN.  nitroglycerin (NITROSTAT) 0.4 mg SL tablet DISSOLVE ONE TABLET UNDER THE TONGUE AT THE START OF CHEST PAIN EVERY 5 MINUTES UP TO 3 DOSES      DULoxetine (CYMBALTA) 60 mg capsule Take 1 Cap by mouth daily. 90 Cap 1    multivitamin (ONE A DAY) tablet Take 1 Tab by mouth daily.  30 Tab 11    omega-3 acid ethyl esters (LOVAZA) 1 gram capsule Take 1 Cap by mouth nightly. 90 Cap 3     Allergies   Allergen Reactions    Contrave [Naltrexone-Bupropion] Other (comments)     Severe constipation    Metformin Nausea Only     Past Medical History:   Diagnosis Date    Abnormal PFT 10/1/2015    Dr. Washington Schroeder, Pulmonology    Acid reflux     CABG x 1 2011    LIMA-LAD    CAD (coronary artery disease)     Cardiac cath 2016    R dom. oLAD 85%. Otherwise patent coronaries. HECTOR to LAD patent. LVEDP 22 mmHg. EF 55%. No RWMA.  Cardiac echocardiogram 2015    Tech difficult. EF 50-55%. No WMA. Mild conc LVH. Gr 2 DDfx. RVSP 45-50 mmHg. Mild LAE. Mild ROSITA. Mod TR. Mild IVCE.  Cardiac nuclear imaging test, high risk 2016    High risk. Mainly fixed anteroapical defect w/partial reversibility. EF 60%. No RWMA. Nondiagnostic EKG on pharm stress test.    Cardiovascular upper extremity arterial duplex 2014    No significant occlusive arterial disease bilaterally.     Coronary atherosclerosis of native coronary artery     Diabetes mellitus ([address] 14    hgbA1C 6.8    Diabetic eye exam ([address]     Dyslipidemia     GERD (gastroesophageal reflux disease)     H/O screening mammography 2016    no evidence of malignancy    Hypercholesterolemia     Hypomagnesemia 14    1.4    Pleural effusion, left     s/p CABG and thoracentesis with removal of 900 mL    Pulmonary arterial hypertension ([address]      Past Surgical History:   Procedure Laterality Date    CARDIAC CATHETERIZATION  2016         CARDIAC SURG PROCEDURE UNLIST      CORONARY ARTERY ANGIOGRAM  2016         HX  SECTION      HX CORONARY ARTERY BYPASS GRAFT  12/31/2011    x1    LV ANGIOGRAPHY  2016          Family History   Problem Relation Age of Onset    Diabetes Mother     Hypertension Father     Alzheimer Father 61     Social History     Tobacco Use    Smoking status: Never Smoker    Smokeless tobacco: Never Used    Tobacco comment: 2nd home smoking from mother during 1st 17 yrs of life   Substance Use Topics    Alcohol use: No     Alcohol/week: 1.0 standard drinks     Types: 1 Standard drinks or equivalent per week       Review of Systems   Constitutional: Negative. Respiratory: Negative. Cardiovascular: Negative. Objective:   Vital Signs: (As obtained by patient/caregiver at home)  There were no vitals taken for this visit. [INSTRUCTIONS:  \"[x]\" Indicates a positive item  \"[]\" Indicates a negative item  -- DELETE ALL ITEMS NOT EXAMINED]    Constitutional: [x] Appears well-developed and well-nourished [x] No apparent distress          Mental status: [x] Alert and awake  [x] Oriented to person/place/time [x] Able to follow commands        Eyes:   EOM    [x]  Normal      Sclera  [x]  Normal              Discharge [x]  None visible        HENT: [x] Normocephalic, atraumatic          Pulmonary/Chest: [x] Respiratory effort normal   [x] No visualized signs of difficulty breathing or respiratory distress       Musculoskeletal:           [x] Normal range of motion of neck           Neurological:        [x] No Facial Asymmetry (Cranial nerve 7 motor function) (limited exam due to video visit)          [x] No gaze palsy                Psychiatric:       [x] Normal Affect                Diagnoses and all orders for this visit:    Essential hypertension    Breast cancer screening  -     GIANNI MAMMO BI SCREENING INCL CAD; Future    Screening for colon cancer  -     COLOGUARD TEST (FECAL DNA COLORECTAL CANCER SCREENING)      PLAN:  We reviewed her health maintenance. Pt will call with any concerns. We discussed the expected course, resolution and complications of the diagnosis(es) in detail. Medication risks, benefits, costs, interactions, and alternatives were discussed as indicated. I advised her to contact the office if her condition worsens, changes or fails to improve as anticipated.  She expressed understanding with the diagnosis(es) and plan. Vinay Quiroz is a 45 y.o. female who was evaluated by a video visit encounter for concerns as above. Patient identification was verified prior to start of the visit. A caregiver was present when appropriate. Due to this being a TeleHealth encounter (During NVIMX-65 public health emergency), evaluation of the following organ systems was limited: Vitals/Constitutional/EENT/Resp/CV/GI//MS/Neuro/Skin/Heme-Lymph-Imm. Pursuant to the emergency declaration under the [facility], [facility] waiver authority and the [employer] Act, this Virtual  Visit was conducted, with patient's (and/or legal guardian's) consent, to reduce the patient's risk of exposure to COVID-19 and provide necessary medical care. Services were provided through a video synchronous discussion virtually to substitute for in-person clinic visit. Patient and provider were located at their individual homes.       Neno Molina NP

## 2020-07-06 ENCOUNTER — VIRTUAL VISIT (OUTPATIENT)
Dept: FAMILY MEDICINE CLINIC | Age: 50
End: 2020-07-06

## 2020-07-06 ENCOUNTER — TELEPHONE (OUTPATIENT)
Dept: CARDIOLOGY CLINIC | Age: 50
End: 2020-07-06

## 2020-07-06 DIAGNOSIS — E11.65 TYPE 2 DIABETES MELLITUS WITH HYPERGLYCEMIA, WITHOUT LONG-TERM CURRENT USE OF INSULIN (HCC): ICD-10-CM

## 2020-07-06 DIAGNOSIS — Z56.0 OUT OF WORK: ICD-10-CM

## 2020-07-06 DIAGNOSIS — I27.21 PULMONARY ARTERIAL HYPERTENSION (HCC): Primary | ICD-10-CM

## 2020-07-06 DIAGNOSIS — I24.9 ACS (ACUTE CORONARY SYNDROME) (HCC): ICD-10-CM

## 2020-07-06 SDOH — ECONOMIC STABILITY - INCOME SECURITY: UNEMPLOYMENT, UNSPECIFIED: Z56.0

## 2020-07-06 NOTE — PROGRESS NOTES
Ilya Diaz is a 45 y.o. female who was seen by synchronous (real-time) audio-video technology on 7/6/2020 for Letter for School/Work    Pt has been out of work since March 25th due to the COVID-19 pandemic. Her place of employment requires documentation of continuing absence due to current high-risk health problems. Assessment & Plan:   Diagnoses and all orders for this visit:    1. Pulmonary arterial hypertension ([facility]    2. Type 2 diabetes mellitus with hyperglycemia, without long-term current use of insulin (HCC)    3. ACS (acute coronary syndrome) ([facility]    4. Out of work    6. Received work note stating continuing absence related to high-risk health problems. Subjective:       Prior to Admission medications    Medication Sig Start Date End Date Taking? Authorizing Provider   magnesium oxide (MAG-OX) 400 mg tablet Take 1 Tab by mouth daily. 5/22/20  Yes Farzana Pederson MD   aspirin delayed-release 81 mg tablet Take 1 Tab by mouth daily. 4/30/20  Yes Farzana Pederson MD   metoprolol tartrate (LOPRESSOR) 25 mg tablet Take 0.5 Tabs by mouth two (2) times a day. 3/2/20  Yes [address] P, NP   gabapentin (NEURONTIN) 300 mg capsule 2 caps QAM, 1 cap at noon, 1 cap at dinner, and 2 caps at HS 3/2/20  Yes [address] P, NP   olmesartan-hydroCHLOROthiazide (BENICAR HCT) 20-12.5 mg per tablet Take 1 Tab by mouth daily. 3/2/20  Yes Cecilio Epps P, NP   lidocaine (LIDODERM) 5 % APPLY 1 NEW PATCH TO SKIN FOR LEFT DORSAL PAIN UP TO 3 PATCHES EACH TIME Q 12 HOURS PRN. 1/25/20  Yes Provider, Historical   nitroglycerin (NITROSTAT) 0.4 mg SL tablet DISSOLVE ONE TABLET UNDER THE TONGUE AT THE START OF CHEST PAIN EVERY 5 MINUTES UP TO 3 DOSES 11/21/19  Yes Provider, Historical   DULoxetine (CYMBALTA) 60 mg capsule Take 1 Cap by mouth daily. 2/21/19  Yes Megha Wells Signs, NP   multivitamin (ONE A DAY) tablet Take 1 Tab by mouth daily.  3/31/15  Yes Hattie Cooper NP   omega-3 acid ethyl esters (LOVAZA) 1 gram capsule Take 1 Cap by mouth nightly. 5/14/14  Yes Barbara Eden NP     Patient Active Problem List   Diagnosis Code    CAD (coronary artery disease), native coronary artery I25.10    S/P CABG x 1 Z95.1    Obesity E66.9    Dyslipidemia E78.5    BMI 45.0-49.9, adult ([facility] Z68.42    Hypomagnesemia E83.42    Diabetes mellitus ([facility] E11.9    Gastroesophageal reflux disease without esophagitis K21.9    Hypertension, goal below 140/80 I10    ACS (acute coronary syndrome) ([facility]) I24.9    Chest pain R07.9    Pulmonary arterial hypertension ([facility]) I27.21    Abnormal PFT R94.2    Type 2 diabetes mellitus with diabetic neuropathy ([facility]) E11.40    Type 2 diabetes with nephropathy ([facility]) E11.21    Perceptive hearing loss, both sides H90.3    Out of work Z56.0     Current Outpatient Medications   Medication Sig Dispense Refill    magnesium oxide (MAG-OX) 400 mg tablet Take 1 Tab by mouth daily. 90 Tab 0    aspirin delayed-release 81 mg tablet Take 1 Tab by mouth daily. 90 Tab 3    metoprolol tartrate (LOPRESSOR) 25 mg tablet Take 0.5 Tabs by mouth two (2) times a day. 1 Tab 0    gabapentin (NEURONTIN) 300 mg capsule 2 caps QAM, 1 cap at noon, 1 cap at dinner, and 2 caps at HS 1 Cap 0    olmesartan-hydroCHLOROthiazide (BENICAR HCT) 20-12.5 mg per tablet Take 1 Tab by mouth daily. 30 Tab 6    lidocaine (LIDODERM) 5 % APPLY 1 NEW PATCH TO SKIN FOR LEFT DORSAL PAIN UP TO 3 PATCHES EACH TIME Q 12 HOURS PRN.  nitroglycerin (NITROSTAT) 0.4 mg SL tablet DISSOLVE ONE TABLET UNDER THE TONGUE AT THE START OF CHEST PAIN EVERY 5 MINUTES UP TO 3 DOSES      DULoxetine (CYMBALTA) 60 mg capsule Take 1 Cap by mouth daily. 90 Cap 1    multivitamin (ONE A DAY) tablet Take 1 Tab by mouth daily. 30 Tab 11    omega-3 acid ethyl esters (LOVAZA) 1 gram capsule Take 1 Cap by mouth nightly.  90 Cap 3     Allergies   Allergen Reactions    Contrave [Naltrexone-Bupropion] Other (comments)     Severe constipation    Metformin Nausea Only       ROS    Objective:   No flowsheet data found. [INSTRUCTIONS:  \"[x]\" Indicates a positive item  \"[]\" Indicates a negative item  -- DELETE ALL ITEMS NOT EXAMINED]    Constitutional: [x] Appears well-developed and well-nourished [x] No apparent distress      [] Abnormal -     Mental status: [x] Alert and awake  [x] Oriented to person/place/time [x] Able to follow commands    [] Abnormal -     Eyes:   EOM    [x]  Normal    [] Abnormal -   Sclera  [x]  Normal    [] Abnormal -          Discharge [x]  None visible   [] Abnormal -     HENT: [x] Normocephalic, atraumatic  [] Abnormal -   [x] Mouth/Throat: Mucous membranes are moist    External Ears [x] Normal  [] Abnormal -    Neck: [x] No visualized mass [] Abnormal -     Pulmonary/Chest: [x] Respiratory effort normal   [x] No visualized signs of difficulty breathing or respiratory distress        [] Abnormal -      Musculoskeletal:   [x] Normal gait with no signs of ataxia         [x] Normal range of motion of neck        [] Abnormal -     Neurological:        [x] No Facial Asymmetry (Cranial nerve 7 motor function) (limited exam due to video visit)          [x] No gaze palsy        [] Abnormal -          Skin:        [x] No significant exanthematous lesions or discoloration noted on facial skin         [] Abnormal -            Psychiatric:       [x] Normal Affect [] Abnormal -        [x] No Hallucinations    Other pertinent observable physical exam findings:-        We discussed the expected course, resolution and complications of the diagnosis(es) in detail. Medication risks, benefits, costs, interactions, and alternatives were discussed as indicated. I advised her to contact the office if her condition worsens, changes or fails to improve as anticipated. She expressed understanding with the diagnosis(es) and plan.        Sandra Hayes, who was evaluated through a patient-initiated, synchronous (real-time) audio-video encounter, and/or her healthcare decision maker, is aware that it is a billable service, with coverage as determined by her insurance carrier. She provided verbal consent to proceed: Yes, and patient identification was verified. It was conducted pursuant to the emergency declaration under the [address] [place] [address], [address] [place] [address] authority and the [employer] and [employer] Act. A caregiver was present when appropriate. Ability to conduct physical exam was limited. I was in the office. The patient was in car.       Aleksandra Posadas NP

## 2020-07-06 NOTE — LETTER
[place] [address] PRIMARY CARE 
[address] [facility], [address] [place] [id number] [phone] Work/School Note Date: 7/6/2020 To Whom It May concern: 
 
Marion Veras was seen today via virtual visit by the following Satnam Salamanca NP. Marion Veras should not return to work due to the high-risk nature of her disease processes related to the COVID-19 pandemic. Any further questions should be directed to her primary care practitioner at the number above. Sincerely, Satnam Salamanca NP

## 2020-07-06 NOTE — LETTER
NOTIFICATION RETURN TO WORK / SCHOOL 
 
7/6/2020 11:32 AM 
 
Ms. Milagro Rivera [address] [place] [id number] To Whom It May Concern: 
 
Milagro Rivera is currently under the care of [address] She was seen today via virtual visit by the following Kiera Neri NP. Milagro Rivera should not return to work due to the high-risk nature of her disease processes related to the COVID-19 pandemic. If there are questions or concerns please contact our office. Sincerely, Kiera Neri NP

## 2020-07-06 NOTE — PATIENT INSTRUCTIONS
Learning About Being High-Risk for COVID-19 Who is at high risk? COVID-19 causes a mild illness in many people who have it. But certain things may increase your risk for more serious illness. These include: · Being age 72 or older. · Smoking. · Living in a long-term care facility. · Having ongoing serious health issues. Some examples are: 
? Chronic lung disease or asthma. ? Heart problems. ? A weakened immune system. ? Cancer treatment. ? Diabetes. This is not a complete list. If you have a chronic health problem, ask your doctor if you should take extra precautions during the outbreak. If you are pregnant, it's safest to consider yourself at higher risk. It's not known yet whether COVID-19 is dangerous for you or your baby. But pregnancy increases the risk for serious illness from viruses similar to COVID-19. How do you stay safe? · Stay home. ? Stay home as much as you can. This may be the easiest way to avoid exposure, as long as no one else in your household has the virus. ? If there are a lot of COVID-19 cases in your community, do not leave your home except to seek medical care. ? Limit visitors right now. It's especially important to avoid contact with anyone who is sick or who might have been exposed. Remember that people may have been exposed without knowing it or having any symptoms. ? Have enough food, medicines, and other supplies on hand so that you don't have to go out. Try some of these options if you don't have what you need. ? Use delivery and takeout services for groceries and meals. ? Have a healthy family member, friend, or neighbor shop for you. ? Ask your doctor for extra prescription medicine. ? Routinely clean and disinfect high-touch surfaces. These include countertops, faucets, door handles, doorknobs, and phones. ? Do not travel. · Wash your hands often and well. ? Wash your hands often, especially after you cough or sneeze.  Use soap and water, and scrub for at least 20 seconds. If soap and water aren't available, use an alcohol-based hand . · Be extra careful if you have to go out. ? Avoid crowds and crowded places. Try to keep 6 feet of space between yourself and others. ? Don't use public transportation, ride-shares, or taxis unless you have no choice. ? Try not to touch things that many other people have touched. Door handles, elevator buttons, shopping cart handles, and handrails on escalators get a lot of touches. ? Carry tissues or paper towels with you. If you must touch something, you'll be able to protect your hands. ? Don't shake hands with anyone. Try a friendly wave instead. ? Don't touch your face, and wash your hands often. ? Wash your hands again as soon as you get home. · Know when to call your doctor. ? Call a doctor if you have symptoms of COVID-19 (fever, cough, shortness of breath). If you are told to get testing or care and must go out, wear a cloth face cover. Current as of: May 8, 2020               Content Version: 12.5 © 2971-3334 [employer]. Care instructions adapted under license by [employer] (which disclaims liability or warranty for this information). If you have questions about a medical condition or this instruction, always ask your healthcare professional. [place] [address] any warranty or liability for your use of this information.

## 2020-07-06 NOTE — TELEPHONE ENCOUNTER
Patient called asking to see if Dr. Alejandra Ahuja could write her a letter to stay out of work during COVID-23. Explained to her that he will be back in office tomorrow and I would ask him then.

## 2020-07-14 ENCOUNTER — OFFICE VISIT (OUTPATIENT)
Dept: CARDIOLOGY CLINIC | Age: 50
End: 2020-07-14

## 2020-07-14 VITALS
DIASTOLIC BLOOD PRESSURE: 64 MMHG | OXYGEN SATURATION: 98 % | BODY MASS INDEX: 48.37 KG/M2 | HEART RATE: 58 BPM | HEIGHT: 63 IN | WEIGHT: 273 LBS | SYSTOLIC BLOOD PRESSURE: 118 MMHG

## 2020-07-14 DIAGNOSIS — E78.5 DYSLIPIDEMIA: ICD-10-CM

## 2020-07-14 DIAGNOSIS — R07.9 CHEST PAIN, UNSPECIFIED TYPE: ICD-10-CM

## 2020-07-14 DIAGNOSIS — I25.10 CORONARY ARTERY DISEASE INVOLVING NATIVE CORONARY ARTERY OF NATIVE HEART WITHOUT ANGINA PECTORIS: Primary | ICD-10-CM

## 2020-07-14 DIAGNOSIS — R94.39 ABNORMAL NUCLEAR STRESS TEST: ICD-10-CM

## 2020-07-14 NOTE — PROGRESS NOTES
Marion Veras presents today for   Chief Complaint   Patient presents with    Coronary Artery Disease     follow up after cath in March - no cardiac complaints        Marion Veras preferred language for health care discussion is english/other. Is someone accompanying this pt? no    Is the patient using any DME equipment during [address]? cane    Depression Screening:  3 most recent PHQ Screens 7/14/2020   Little interest or pleasure in doing things Not at all   Feeling down, depressed, irritable, or hopeless Not at all   Total Score PHQ 2 0       Learning Assessment:  Learning Assessment 3/17/2017   PRIMARY LEARNER Patient   HIGHEST LEVEL OF EDUCATION - PRIMARY LEARNER  -   BARRIERS PRIMARY LEARNER -   CO-LEARNER CAREGIVER -   PRIMARY LANGUAGE ENGLISH   LEARNER PREFERENCE PRIMARY DEMONSTRATION   ANSWERED BY patient   RELATIONSHIP SELF       Abuse Screening:  Abuse Screening Questionnaire 1/30/2018   Do you ever feel afraid of your partner? N   Are you in a relationship with someone who physically or mentally threatens you? N   Is it safe for you to go home? Y       Fall Risk  No flowsheet data found. Pt currently taking Anticoagulant therapy? ASA 81mg every day     Coordination of Care:  1. Have you been to the ER, urgent care clinic since your last visit? Hospitalized since your last visit? 3/12 for cath     2. Have you seen or consulted any other health care providers outside of the [address] [place] [address] since your last visit? Include any pap smears or colon screening.  no

## 2020-07-31 ENCOUNTER — OFFICE VISIT (OUTPATIENT)
Dept: FAMILY MEDICINE CLINIC | Age: 50
End: 2020-07-31

## 2020-07-31 VITALS
SYSTOLIC BLOOD PRESSURE: 111 MMHG | HEIGHT: 63 IN | DIASTOLIC BLOOD PRESSURE: 56 MMHG | BODY MASS INDEX: 48.41 KG/M2 | RESPIRATION RATE: 18 BRPM | TEMPERATURE: 98.2 F | WEIGHT: 273.2 LBS | OXYGEN SATURATION: 94 % | HEART RATE: 57 BPM

## 2020-07-31 DIAGNOSIS — M72.2 PLANTAR FASCIITIS OF RIGHT FOOT: Primary | ICD-10-CM

## 2020-07-31 NOTE — PROGRESS NOTES
Alia Sanchez is a  45 y.o. female presents today for office visit for swelling of right foot. 1. Have you been to the ER, urgent care clinic or hospitalized since your last visit? No     2. Have you seen or consulted any other health care providers outside of the [address] [place] [address] since your last visit (Include any pap smears or colon screening)? No

## 2020-07-31 NOTE — PROGRESS NOTES
HISTORY OF PRESENT ILLNESS  Galina Ibanez is a 45 y.o. female. Patient presents for right foot swelling. HPI  Pt noted this about 2 weeks ago. The foot hurts on the bottom. It throbs for about 2 hours when she sits in the recliner. The swelling starts at the beginning of the day and gets worse as the day goes on then it goes down at night. Review of Systems   Constitutional: Negative. Respiratory: Negative. Musculoskeletal: Positive for joint pain (right foot). Visit Vitals  /56   Pulse (!) 57   Temp 98.2 °F (36.8 °C) (Oral)   Resp 18   Ht 5' 3\" (1.6 m)   Wt 273 lb 3.2 oz (123.9 kg)   SpO2 94%   BMI 48.40 kg/m²       Physical Exam  Constitutional:       General: She is not in acute distress. Appearance: Normal appearance. She is obese. She is not ill-appearing. Cardiovascular:      Rate and Rhythm: Normal rate and regular rhythm. Heart sounds: No murmur. Pulmonary:      Effort: Pulmonary effort is normal.      Breath sounds: Normal breath sounds. Musculoskeletal:      Right foot: Normal range of motion. Neurological:      Mental Status: She is alert and oriented to person, place, and time. Psychiatric:         Behavior: Behavior normal.         ASSESSMENT and PLAN    ICD-10-CM ICD-9-CM    1. Plantar fasciitis of right foot  M72.2 728.71 REFERRAL TO PODIATRY     PLAN:  Advised Pt to do the following for two weeks:  Heel cups  Once a day: roll foot over a frozen water bottle for 20 minutes and then soak in warm water for 20 minutes. Anaprox 550mg bid  If no improvement after 2 weeks, call the office    I have discussed the diagnosis with the patient and the intended plan as seen in the above orders. The patient has received an after-visit summary and questions were answered concerning future plans. I have discussed medication side effects and warnings with the patient as well. Patient will call for further questions.     Follow-up and Dispositions    · Return if symptoms worsen or fail to improve.

## 2020-07-31 NOTE — PATIENT INSTRUCTIONS
Advised Pt to do the following for two weeks: 
Heel cups Once a day: roll foot over a frozen water bottle for 20 minutes and then soak in warm water for 20 minutes. Anaprox 550mg bid If no improvement after 2 weeks, call the office, next step would be podiatry referral.

## 2020-08-05 ENCOUNTER — TELEPHONE (OUTPATIENT)
Dept: FAMILY MEDICINE CLINIC | Age: 50
End: 2020-08-05

## 2020-08-05 NOTE — TELEPHONE ENCOUNTER
Patient called din to check on medication for antibiotics.  [facility]'t have anything from provider  [phone]

## 2020-08-07 NOTE — TELEPHONE ENCOUNTER
Patient is in office today, she said that the medication that she was supposed to be taken was stronger than Aleve, and it is in reference to her heel spur

## 2020-08-12 RX ORDER — NAPROXEN SODIUM 550 MG/1
550 TABLET ORAL 2 TIMES DAILY WITH MEALS
Qty: 30 TAB | Refills: 1 | Status: SHIPPED | OUTPATIENT
Start: 2020-08-12 | End: 2021-02-15 | Stop reason: ALTCHOICE

## 2020-08-12 NOTE — TELEPHONE ENCOUNTER
Patient is requesting her Rx for Naproxen as discussed during last OV. This med was never sent to the pharmacy. Please advise and pend new Rx if appropriate.     Notes from last OV:  Anaprox 550mg bid  If no improvement after 2 weeks, call the office    Last visit:  7/31/20 with ERICK Leung appt:  none

## 2020-08-12 NOTE — TELEPHONE ENCOUNTER
Patient called wanting to know why Anaprox 550mg was not sent to pharmacy. Please send to [facility] [address]ORRAINE DE ANDA at [phone].    07/31/2020  Office visit -Dr Sabine Daugherty and PLAN     ICD-10-CM ICD-9-CM     1. Plantar fasciitis of right foot  M72.2 728.71 REFERRAL TO PODIATRY     PLAN:  Advised Pt to do the following for two weeks:  Heel cups  Once a day: roll foot over a frozen water bottle for 20 minutes and then soak in warm water for 20 minutes. Anaprox 550mg bid  If no improvement after 2 weeks, call the office     I have discussed the diagnosis with the patient and the intended plan as seen in the above orders. The patient has received an after-visit summary and questions were answered concerning future plans. I have discussed medication side effects and warnings with the patient as well. Patient will call for further questions.

## 2020-08-19 DIAGNOSIS — G47.62 NOCTURNAL LEG CRAMPS: ICD-10-CM

## 2020-08-19 DIAGNOSIS — E83.42 HYPOMAGNESEMIA: ICD-10-CM

## 2020-08-21 RX ORDER — LANOLIN ALCOHOL/MO/W.PET/CERES
CREAM (GRAM) TOPICAL
Qty: 90 TAB | Refills: 0 | Status: SHIPPED | OUTPATIENT
Start: 2020-08-21 | End: 2020-11-25

## 2020-08-21 NOTE — TELEPHONE ENCOUNTER
Last Visit: 7/31/20 with MD King Crockett  Next Appointment: none  Previous Refill Encounter(s): 5/22/20 #90    Requested Prescriptions     Pending Prescriptions Disp Refills    magnesium oxide (MAG-OX) 400 mg tablet [Pharmacy Med Name: MAG OXIDE 400MG     TAB] 90 Tab 0     Sig: Take 1 tablet by mouth once daily

## 2020-09-29 ENCOUNTER — HOSPITAL ENCOUNTER (OUTPATIENT)
Dept: MAMMOGRAPHY | Age: 50
Discharge: HOME | End: 2020-09-29
Attending: NURSE PRACTITIONER
Payer: COMMERCIAL

## 2020-09-29 DIAGNOSIS — Z12.39 BREAST CANCER SCREENING: ICD-10-CM

## 2020-09-29 PROCEDURE — 77067 SCR MAMMO BI INCL CAD: CPT

## 2020-11-19 ENCOUNTER — VIRTUAL VISIT (OUTPATIENT)
Dept: FAMILY MEDICINE CLINIC | Age: 50
End: 2020-11-19
Payer: COMMERCIAL

## 2020-11-19 DIAGNOSIS — H65.192 OTHER ACUTE NONSUPPURATIVE OTITIS MEDIA OF LEFT EAR, RECURRENCE NOT SPECIFIED: Primary | ICD-10-CM

## 2020-11-19 DIAGNOSIS — G44.219 EPISODIC TENSION-TYPE HEADACHE, NOT INTRACTABLE: ICD-10-CM

## 2020-11-19 PROCEDURE — 99213 OFFICE O/P EST LOW 20 MIN: CPT | Performed by: NURSE PRACTITIONER

## 2020-11-19 RX ORDER — AMOXICILLIN 875 MG/1
875 TABLET, FILM COATED ORAL 2 TIMES DAILY
Qty: 20 TAB | Refills: 0 | Status: SHIPPED | OUTPATIENT
Start: 2020-11-19 | End: 2020-11-29

## 2020-11-19 RX ORDER — PSEUDOEPHEDRINE HYDROCHLORIDE 60 MG/1
60 TABLET ORAL
Qty: 12 TAB | Refills: 0 | Status: SHIPPED | OUTPATIENT
Start: 2020-11-19 | End: 2020-11-22

## 2020-11-19 RX ORDER — BUTALBITAL, ACETAMINOPHEN AND CAFFEINE 50; 325; 40 MG/1; MG/1; MG/1
1 TABLET ORAL
Qty: 60 TAB | Refills: 0 | Status: SHIPPED | OUTPATIENT
Start: 2020-11-19 | End: 2020-11-19 | Stop reason: SDUPTHER

## 2020-11-19 RX ORDER — BUTALBITAL, ACETAMINOPHEN AND CAFFEINE 50; 325; 40 MG/1; MG/1; MG/1
1 TABLET ORAL
Qty: 60 TAB | Refills: 0 | Status: SHIPPED | OUTPATIENT
Start: 2020-11-19 | End: 2020-12-02 | Stop reason: ALTCHOICE

## 2020-11-19 NOTE — PROGRESS NOTES
Didi Solano is a 45 y.o. female who was seen by synchronous (real-time) audio-video technology on 11/19/2020 for Ear Pain and Headache    Pt is being evaluated for  1 week(s) history of left ear pain in left ear, and headaches. Pt states that she has had the ear pain since May but has been using OTC ear drops and they were working until last week. Denies congestion, coryza, fever and coughTemperature not measured at home. Pt is also having headaches for the past month. The headaches are global and achy. The headaches are lasting 6-7 hours. She has been taking BC's, Extra Strength Tylenol, Excedrin. None of the OTC' are working. Pt does wear her reading glasses all day long with a 1.75 magnification. She is also on her computer at work a lot since September. She was seen at the eye doctor and they just recommended reading glasses. Assessment & Plan:   Diagnoses and all orders for this visit:    1. Other acute nonsuppurative otitis media of left ear, recurrence not specified  -     amoxicillin (AMOXIL) 875 mg tablet; Take 1 Tab by mouth two (2) times a day for 10 days. -     pseudoephedrine (SUDAFED) 60 mg tablet; Take 1 Tab by mouth every six (6) hours as needed for Congestion for up to 3 days. 2. Episodic tension-type headache, not intractable  -     butalbital-acetaminophen-caffeine (FIORICET, ESGIC) -40 mg per tablet; Take 1 Tab by mouth every four (4) hours as needed for Headache. 3. Pt was instructed to look into blue blocking reading glasses to help with eye strain during work hours and while on the computer. Follow-up and Dispositions    · Return in about 3 months (around 2/19/2021), or if symptoms worsen or fail to improve, for [facility]. 712  Subjective:       Prior to Admission medications    Medication Sig Start Date End Date Taking? Authorizing Provider   amoxicillin (AMOXIL) 875 mg tablet Take 1 Tab by mouth two (2) times a day for 10 days.  11/19/20 11/29/20 Yes Carlo Veliz NP   butalbital-acetaminophen-caffeine (FIORICET, ESGIC) -40 mg per tablet Take 1 Tab by mouth every four (4) hours as needed for Headache. 11/19/20  Yes Carlo Veliz NP   pseudoephedrine (SUDAFED) 60 mg tablet Take 1 Tab by mouth every six (6) hours as needed for Congestion for up to 3 days. 11/19/20 11/22/20 Yes Carlo Veliz NP   magnesium oxide (MAG-OX) 400 mg tablet Take 1 tablet by mouth once daily 8/21/20  Yes Cleve Staff, MD   aspirin delayed-release 81 mg tablet Take 1 Tab by mouth daily. 4/30/20  Yes Cleve Staff, MD   metoprolol tartrate (LOPRESSOR) 25 mg tablet Take 0.5 Tabs by mouth two (2) times a day. 3/2/20  Yes Mukesh PAINTER NP   gabapentin (NEURONTIN) 300 mg capsule 2 caps QAM, 1 cap at noon, 1 cap at dinner, and 2 caps at HS 3/2/20  Yes Mukesh PAINTER NP   olmesartan-hydroCHLOROthiazide (BENICAR HCT) 20-12.5 mg per tablet Take 1 Tab by mouth daily. 3/2/20  Yes Jericho Epps NP   DULoxetine (CYMBALTA) 60 mg capsule Take 1 Cap by mouth daily. 2/21/19  Yes Jana Wells NP   multivitamin (ONE A DAY) tablet Take 1 Tab by mouth daily. 3/31/15  Yes Gin Del Angel NP   omega-3 acid ethyl esters (LOVAZA) 1 gram capsule Take 1 Cap by mouth nightly. 5/14/14  Yes Gin Del Angel NP   naproxen sodium (ANAPROX) 550 mg tablet Take 1 Tab by mouth two (2) times daily (with meals).  8/12/20   Jose Alejandro Olivier NP   lidocaine (LIDODERM) 5 % APPLY 1 NEW PATCH TO SKIN FOR LEFT DORSAL PAIN UP TO 3 PATCHES EACH TIME Q 12 HOURS PRN. 1/25/20   Provider, Historical   nitroglycerin (NITROSTAT) 0.4 mg SL tablet DISSOLVE ONE TABLET UNDER THE TONGUE AT THE START OF CHEST PAIN EVERY 5 MINUTES UP TO 3 DOSES 11/21/19   Provider, Historical     Patient Active Problem List   Diagnosis Code    CAD (coronary artery disease), native coronary artery I25.10    S/P CABG x 1 Z95.1    Obesity E66.9    Dyslipidemia E78.5    BMI 45.0-49.9, adult ([facility] Z68.42    Hypomagnesemia E83.42    Diabetes mellitus ([facility] E11.9    Gastroesophageal reflux disease without esophagitis K21.9    Hypertension, goal below 140/80 I10    ACS (acute coronary syndrome) ([facility]) I24.9    Chest pain R07.9    Pulmonary arterial hypertension ([facility]) I27.21    Abnormal PFT R94.2    Type 2 diabetes mellitus with diabetic neuropathy ([facility]) E11.40    Type 2 diabetes with nephropathy ([facility]) E11.21    Perceptive hearing loss, both sides H90.3    Out of work Z56.0     Current Outpatient Medications   Medication Sig Dispense Refill    amoxicillin (AMOXIL) 875 mg tablet Take 1 Tab by mouth two (2) times a day for 10 days. 20 Tab 0    butalbital-acetaminophen-caffeine (FIORICET, ESGIC) -40 mg per tablet Take 1 Tab by mouth every four (4) hours as needed for Headache. 60 Tab 0    pseudoephedrine (SUDAFED) 60 mg tablet Take 1 Tab by mouth every six (6) hours as needed for Congestion for up to 3 days. 12 Tab 0    magnesium oxide (MAG-OX) 400 mg tablet Take 1 tablet by mouth once daily 90 Tab 0    aspirin delayed-release 81 mg tablet Take 1 Tab by mouth daily. 90 Tab 3    metoprolol tartrate (LOPRESSOR) 25 mg tablet Take 0.5 Tabs by mouth two (2) times a day. 1 Tab 0    gabapentin (NEURONTIN) 300 mg capsule 2 caps QAM, 1 cap at noon, 1 cap at dinner, and 2 caps at HS 1 Cap 0    olmesartan-hydroCHLOROthiazide (BENICAR HCT) 20-12.5 mg per tablet Take 1 Tab by mouth daily. 30 Tab 6    DULoxetine (CYMBALTA) 60 mg capsule Take 1 Cap by mouth daily. 90 Cap 1    multivitamin (ONE A DAY) tablet Take 1 Tab by mouth daily. 30 Tab 11    omega-3 acid ethyl esters (LOVAZA) 1 gram capsule Take 1 Cap by mouth nightly. 90 Cap 3    naproxen sodium (ANAPROX) 550 mg tablet Take 1 Tab by mouth two (2) times daily (with meals). 30 Tab 1    lidocaine (LIDODERM) 5 % APPLY 1 NEW PATCH TO SKIN FOR LEFT DORSAL PAIN UP TO 3 PATCHES EACH TIME Q 12 HOURS PRN.       nitroglycerin (NITROSTAT) 0.4 mg SL tablet DISSOLVE ONE TABLET UNDER THE TONGUE AT THE START OF CHEST PAIN EVERY 5 MINUTES UP TO 3 DOSES       Allergies   Allergen Reactions    Contrave [Naltrexone-Bupropion] Other (comments)     Severe constipation    Metformin Nausea Only       ROS    Objective:   No flowsheet data found. General: alert, cooperative, no distress   Mental  status: normal mood, behavior, speech, dress, motor activity, and thought processes, able to follow commands   HENT: NCAT   Neck: no visualized mass   Resp: no respiratory distress   Neuro: no gross deficits   Skin: no discoloration or lesions of concern on visible areas   Psychiatric: normal affect, consistent with stated mood, no evidence of hallucinations     Additional exam findings: N/A      We discussed the expected course, resolution and complications of the diagnosis(es) in detail. Medication risks, benefits, costs, interactions, and alternatives were discussed as indicated. I advised her to contact the office if her condition worsens, changes or fails to improve as anticipated. She expressed understanding with the diagnosis(es) and plan. Jose Hobson, who was evaluated through a patient-initiated, synchronous (real-time) audio-video encounter, and/or her healthcare decision maker, is aware that it is a billable service, with coverage as determined by her insurance carrier. She provided verbal consent to proceed: Yes, and patient identification was verified. It was conducted pursuant to the emergency declaration under the [address] [place] [address], [address] [place] [address] authority and the [employer] Act. A caregiver was present when appropriate. Ability to conduct physical exam was limited. I was in the office. The patient was at home.       Paulette Mitchell NP

## 2020-11-21 DIAGNOSIS — E83.42 HYPOMAGNESEMIA: ICD-10-CM

## 2020-11-21 DIAGNOSIS — G47.62 NOCTURNAL LEG CRAMPS: ICD-10-CM

## 2020-11-24 DIAGNOSIS — E83.42 HYPOMAGNESEMIA: ICD-10-CM

## 2020-11-24 DIAGNOSIS — G47.62 NOCTURNAL LEG CRAMPS: ICD-10-CM

## 2020-11-24 RX ORDER — LANOLIN ALCOHOL/MO/W.PET/CERES
CREAM (GRAM) TOPICAL
Qty: 90 TAB | Refills: 0 | Status: CANCELLED | OUTPATIENT
Start: 2020-11-24

## 2020-11-24 RX ORDER — ASPIRIN 81 MG/1
81 TABLET ORAL DAILY
Qty: 90 TAB | Refills: 3 | Status: CANCELLED | OUTPATIENT
Start: 2020-11-24

## 2020-11-25 RX ORDER — OLMESARTAN MEDOXOMIL AND HYDROCHLOROTHIAZIDE 20/12.5 20; 12.5 MG/1; MG/1
1 TABLET ORAL DAILY
Qty: 30 TAB | Refills: 6 | Status: SHIPPED | OUTPATIENT
Start: 2020-11-25 | End: 2021-02-15 | Stop reason: ALTCHOICE

## 2020-11-25 RX ORDER — ASPIRIN 81 MG/1
TABLET ORAL
Qty: 90 TAB | Refills: 0 | Status: SHIPPED | OUTPATIENT
Start: 2020-11-25 | End: 2021-05-24 | Stop reason: SDUPTHER

## 2020-11-25 RX ORDER — LANOLIN ALCOHOL/MO/W.PET/CERES
CREAM (GRAM) TOPICAL
Qty: 90 TAB | Refills: 0 | Status: SHIPPED | OUTPATIENT
Start: 2020-11-25 | End: 2021-02-22 | Stop reason: SDUPTHER

## 2020-11-25 NOTE — TELEPHONE ENCOUNTER
Duplicate request - Mag-Ox 400 mg #90 &   Aspirin DR 81 mg #90 was sent to [facility] on 11/25/2020. Requested Prescriptions     Pending Prescriptions Disp Refills    aspirin delayed-release 81 mg tablet 90 Tab 3     Sig: Take 1 Tab by mouth daily.     magnesium oxide (MAG-OX) 400 mg tablet 90 Tab 0
w/ bed flat

## 2020-12-02 ENCOUNTER — OFFICE VISIT (OUTPATIENT)
Dept: FAMILY MEDICINE CLINIC | Age: 50
End: 2020-12-02
Payer: COMMERCIAL

## 2020-12-02 VITALS
SYSTOLIC BLOOD PRESSURE: 114 MMHG | RESPIRATION RATE: 17 BRPM | WEIGHT: 270.6 LBS | DIASTOLIC BLOOD PRESSURE: 70 MMHG | HEIGHT: 63 IN | TEMPERATURE: 97.8 F | OXYGEN SATURATION: 92 % | HEART RATE: 60 BPM | BODY MASS INDEX: 47.95 KG/M2

## 2020-12-02 DIAGNOSIS — G50.0 TRIGEMINAL NEURALGIA OF LEFT SIDE OF FACE: Primary | ICD-10-CM

## 2020-12-02 PROCEDURE — 99213 OFFICE O/P EST LOW 20 MIN: CPT | Performed by: NURSE PRACTITIONER

## 2020-12-02 RX ORDER — CARBAMAZEPINE 200 MG/1
200 TABLET ORAL 2 TIMES DAILY
Qty: 240 TAB | Refills: 0 | Status: SHIPPED | OUTPATIENT
Start: 2020-12-02 | End: 2021-02-26

## 2020-12-02 NOTE — LETTER
NOTIFICATION RETURN TO WORK / SCHOOL 
 
12/2/2020 12:25 PM 
 
Ms. Rigoberto Walters [address] [place] [phone] To Whom It May Concern: 
 
Rigoberto Walters is currently under the care of [address] She will return to work/school on: 12/2/20 If there are questions or concerns please have the patient contact our office. Sincerely, Peace Jones NP

## 2020-12-02 NOTE — PROGRESS NOTES
Bri Chavez presents today for ear pain   - Is someone accompanying this pt? no  - Is the patient using any durable medical equipment during office visit? [email or web address]    Coordination of Care:  1. Have you been to the ER, urgent care clinic since your last visit? Hospitalized since your last visit? no    2. Have you seen or consulted any other health care providers outside of the [address] [place] [address] since your last visit? Include any pap smears or colon screening.  n

## 2020-12-02 NOTE — PATIENT INSTRUCTIONS
Trigeminal Neuralgia: Care Instructions  Your Care Instructions     Trigeminal neuralgia is a problem with the large nerve that brings feeling to your face. It causes a sudden, sharp pain on one side of your face. Just touching your cheek or talking can set off shooting pain toward the ear, eye, or nostril. Living with this pain can be very hard. Some people have long periods when they do not have pain, and then it comes back. Some people have periods of pain often. But medicine or other treatment often can make the pain go away. If you keep having pain, surgery may help. This problem is also called tic douloureux (say \"tik doo-khalif-HELENE\"). Follow-up care is a key part of your treatment and safety. Be sure to make and go to all appointments, and call your doctor if you are having problems. It's also a good idea to know your test results and keep a list of the medicines you take. How can you care for yourself at home? · Write down when you have pain and what you were doing when it started. Try to find what causes the pain. Being in a cold wind, yawning, or shaving are examples. Avoid or limit these triggers if you can. · Be safe with medicines. Take your medicines exactly as prescribed. ? Your doctor may have prescribed medicines used to treat depression and seizures. They can reduce your pain, help you sleep better, and improve your mood. ? Call your doctor if you think you are having a problem with your medicine. You will get more details on the specific medicines your doctor prescribes. ? If you are not taking a prescription pain medicine, take an over-the-counter medicine such as acetaminophen (Tylenol), ibuprofen (Advil, Motrin), or naproxen (Aleve). Read and follow all instructions on the label. ? Do not take two or more pain medicines at the same time unless the doctor told you to. Many pain medicines have acetaminophen, which is Tylenol. Too much acetaminophen (Tylenol) can be harmful.   · Reduce stress in your life. Ask your doctor about ways to relax. These may include breathing exercises and massage. · Think about joining a support group with other people who have this problem. These groups can give comfort and information about what to do to feel better. Your doctor can tell you how to find a support group. When should you call for help? Call your doctor now or seek immediate medical care if:    · You have severe pain that you can't control. Watch closely for changes in your health, and be sure to contact your doctor if:    · You are not able to sleep because of the pain.     · You do not get better as expected. Where can you learn more? Go to http://www.gray.com/  Enter R378 in the search box to learn more about \"Trigeminal Neuralgia: Care Instructions. \"  Current as of: June 26, 2019               Content Version: 12.6  © 7972-8465 [employer] for [employer]. Care instructions adapted under license by [employer] (which disclaims liability or warranty for this information). If you have questions about a medical condition or this instruction, always ask your healthcare professional. [address] any warranty or liability for your use of this information.

## 2020-12-02 NOTE — LETTER
NOTIFICATION RETURN TO WORK / SCHOOL 
 
12/2/2020 12:20 PM 
 
Ms. Jade Ramirez [address] [place] [phone] To Whom It May Concern: 
 
Jade [facility] She will return to work/school on: 12/3/20 If there are questions or concerns please have the patient contact our office. Sincerely, Angy Catalan NP

## 2020-12-02 NOTE — PROGRESS NOTES
General Office Visit Note      Patient:  Maico Parnell    Subjective:     Gustavo Wilburn is a 45 y.o. y.o. female who complains of   Chief Complaint   Patient presents with    Ear Pain     1mth      Pt is here following-up for ear pain the has been present for seven months. Pt was recently seen in November for an increase in the pain in the ear. She was given abx and sudafed with some relief. Pt state that after the medication was done the ache never went away. Pt denies swimming, any drainage, or hearing loss, or tinnitus. Past Medical History:   Diagnosis Date    Abnormal PFT 10/1/2015    Dr. Santiago Schroeder, Pulmonology    Acid reflux     CABG x 1 12/31/2011    LIMA-LAD    CAD (coronary artery disease)     Cardiac cath 11/23/2016    R dom. oLAD 85%. Otherwise patent coronaries. HECTOR to LAD patent. LVEDP 22 mmHg. EF 55%. No RWMA.  Cardiac echocardiogram 07/28/2015    Tech difficult. EF 50-55%. No WMA. Mild conc LVH. Gr 2 DDfx. RVSP 45-50 mmHg. Mild LAE. Mild ROSITA. Mod TR. Mild IVCE.  Cardiac nuclear imaging test, high risk 11/23/2016    High risk. Mainly fixed anteroapical defect w/partial reversibility. EF 60%. No RWMA. Nondiagnostic EKG on pharm stress test.    Cardiovascular upper extremity arterial duplex 01/03/2014    No significant occlusive arterial disease bilaterally.     Coronary atherosclerosis of native coronary artery     Diabetes mellitus ([address] 9/22/14    hgbA1C 6.8    Diabetic eye exam ([address] 2016    Dyslipidemia     GERD (gastroesophageal reflux disease)     H/O screening mammography 12/06/2016    no evidence of malignancy    Hypercholesterolemia     Hypomagnesemia 8/14/14    1.4    Pleural effusion, left     s/p CABG and thoracentesis with removal of 900 mL    Pulmonary arterial hypertension ([address]      Past Surgical History:   Procedure Laterality Date    CARDIAC CATHETERIZATION  11/23/2016         CARDIAC SURG PROCEDURE UNLIST      CORONARY ARTERY ANGIOGRAM  2016         HX  SECTION      HX CORONARY ARTERY BYPASS GRAFT  12/31/2011    x1    LV ANGIOGRAPHY  2016          Social History     Socioeconomic History    Marital status:      Spouse name: Not on file    Number of children: Not on file    Years of education: Not on file    Highest education level: Not on file   Tobacco Use    Smoking status: Never Smoker    Smokeless tobacco: Never Used    Tobacco comment: 2nd home smoking from mother during 1st 17 yrs of life   Substance and Sexual Activity    Alcohol use: No     Alcohol/week: 1.0 standard drinks     Types: 1 Standard drinks or equivalent per week    Drug use: No    Sexual activity: Yes     Partners: Male     Birth control/protection: None   Other Topics Concern    Caffeine Concern No    Exercise Yes     Comment: stretching and ride bike twice a week    [facility] Yes     Current Outpatient Medications   Medication Sig Dispense Refill    carBAMazepine (TEGretol) 200 mg tablet Take 1 Tab by mouth two (2) times a day. 240 Tab 0    magnesium oxide (MAG-OX) 400 mg tablet Take 1 tablet by mouth once daily 90 Tab 0    aspirin delayed-release 81 mg tablet Take 1 tablet by mouth once daily 90 Tab 0    olmesartan-hydroCHLOROthiazide (BENICAR HCT) 20-12.5 mg per tablet Take 1 Tab by mouth daily. 30 Tab 6    naproxen sodium (ANAPROX) 550 mg tablet Take 1 Tab by mouth two (2) times daily (with meals). 30 Tab 1    metoprolol tartrate (LOPRESSOR) 25 mg tablet Take 0.5 Tabs by mouth two (2) times a day. 1 Tab 0    gabapentin (NEURONTIN) 300 mg capsule 2 caps QAM, 1 cap at noon, 1 cap at dinner, and 2 caps at HS 1 Cap 0    lidocaine (LIDODERM) 5 % APPLY 1 NEW PATCH TO SKIN FOR LEFT DORSAL PAIN UP TO 3 PATCHES EACH TIME Q 12 HOURS PRN.       nitroglycerin (NITROSTAT) 0.4 mg SL tablet DISSOLVE ONE TABLET UNDER THE TONGUE AT THE START OF CHEST PAIN EVERY 5 MINUTES UP TO 3 DOSES      DULoxetine (CYMBALTA) 60 mg capsule Take 1 Cap by mouth daily. 90 Cap 1    multivitamin (ONE A DAY) tablet Take 1 Tab by mouth daily. 30 Tab 11    omega-3 acid ethyl esters (LOVAZA) 1 gram capsule Take 1 Cap by mouth nightly. 90 Cap 3     Allergies   Allergen Reactions    Contrave [Naltrexone-Bupropion] Other (comments)     Severe constipation    Metformin Nausea Only     The patient has a family history of    REVIEW OF SYSTEMS  ROS    Objective:     Visit Vitals  /70 (BP 1 Location: Right arm, BP Patient Position: Sitting)   Pulse 60   Temp 97.8 °F (36.6 °C)   Resp 17   Ht 5' 3\" (1.6 m)   Wt 270 lb 9.6 oz (122.7 kg)   SpO2 92%   BMI 47.93 kg/m²       Current Outpatient Medications   Medication Instructions    aspirin delayed-release 81 mg tablet Take 1 tablet by mouth once daily    carBAMazepine (TEGRETOL) 200 mg, Oral, 2 TIMES DAILY    DULoxetine (CYMBALTA) 60 mg, Oral, DAILY    gabapentin (NEURONTIN) 300 mg capsule 2 caps QAM, 1 cap at noon, 1 cap at dinner, and 2 caps at HS    lidocaine (LIDODERM) 5 % APPLY 1 NEW PATCH TO SKIN FOR LEFT DORSAL PAIN UP TO 3 PATCHES EACH TIME Q 12 HOURS PRN.  magnesium oxide (MAG-OX) 400 mg tablet Take 1 tablet by mouth once daily    metoprolol tartrate (LOPRESSOR) 12.5 mg, Oral, 2 TIMES DAILY    multivitamin (ONE A DAY) tablet 1 Tab, Oral, DAILY    naproxen sodium (ANAPROX) 550 mg, Oral, 2 TIMES DAILY WITH MEALS    nitroglycerin (NITROSTAT) 0.4 mg SL tablet DISSOLVE ONE TABLET UNDER THE TONGUE AT THE START OF CHEST PAIN EVERY 5 MINUTES UP TO 3 DOSES    olmesartan-hydroCHLOROthiazide (BENICAR HCT) 20-12.5 mg per tablet 1 Tab, Oral, DAILY    omega-3 acid ethyl esters (LOVAZA) 1,000 mg, Oral, EVERY BEDTIME        PHYSICAL EXAM  Physical Exam    Assessment/Plan:     Diagnoses and all orders for this visit:    1. Trigeminal neuralgia of left side of face  -     MRI ORB FACE NECK W WO CONT; Future  -     carBAMazepine (TEGretol) 200 mg tablet;  Take 1 Tab by mouth two (2) times a day.        Follow-up and Dispositions    · Return in about 3 months (around 3/2/2021) for facial/ear pain. Disclaimer:    I have discussed the diagnosis with the patient and the intended plan as seen above. The patient understands our medical plan. The risks, benefits and significant side effects of all medications have been reviewed. Anticipated time course and progression of condition reviewed. All questions have been addressed. She received an after visit summary, with information reviewed, and questions answered. Where appropriate, she is instructed to call the clinic if she has not been notified either by phone or through [phone] with the results of her tests or with an appointment plan for any referrals within 1 week(s). The patient  is to call if her condition worsens or fails to improve or if significant side effects are experienced.        Jose Hurley NP

## 2020-12-10 RX ORDER — OLMESARTAN MEDOXOMIL AND HYDROCHLOROTHIAZIDE 20/12.5 20; 12.5 MG/1; MG/1
TABLET ORAL
Qty: 30 TAB | Refills: 0 | Status: SHIPPED | OUTPATIENT
Start: 2020-12-10 | End: 2021-02-15 | Stop reason: ALTCHOICE

## 2021-01-12 ENCOUNTER — HOSPITAL ENCOUNTER (OUTPATIENT)
Age: 51
Discharge: HOME | End: 2021-01-12
Attending: NURSE PRACTITIONER

## 2021-01-12 ENCOUNTER — OFFICE VISIT (OUTPATIENT)
Dept: CARDIOLOGY CLINIC | Age: 51
End: 2021-01-12
Payer: COMMERCIAL

## 2021-01-12 VITALS
WEIGHT: 273 LBS | SYSTOLIC BLOOD PRESSURE: 128 MMHG | DIASTOLIC BLOOD PRESSURE: 60 MMHG | HEART RATE: 58 BPM | HEIGHT: 63 IN | OXYGEN SATURATION: 97 % | BODY MASS INDEX: 48.37 KG/M2

## 2021-01-12 DIAGNOSIS — R07.9 CHEST PAIN, UNSPECIFIED TYPE: ICD-10-CM

## 2021-01-12 DIAGNOSIS — G50.0 TRIGEMINAL NEURALGIA OF LEFT SIDE OF FACE: ICD-10-CM

## 2021-01-12 DIAGNOSIS — I25.10 CORONARY ARTERY DISEASE INVOLVING NATIVE CORONARY ARTERY OF NATIVE HEART WITHOUT ANGINA PECTORIS: Primary | ICD-10-CM

## 2021-01-12 DIAGNOSIS — R94.39 ABNORMAL NUCLEAR STRESS TEST: ICD-10-CM

## 2021-01-12 DIAGNOSIS — R94.31 ABNORMAL EKG: ICD-10-CM

## 2021-01-12 PROCEDURE — 99214 OFFICE O/P EST MOD 30 MIN: CPT | Performed by: INTERNAL MEDICINE

## 2021-01-12 PROCEDURE — 93000 ELECTROCARDIOGRAM COMPLETE: CPT | Performed by: INTERNAL MEDICINE

## 2021-01-12 NOTE — PROGRESS NOTES
Davey Bertsuly presents today for   Chief Complaint   Patient presents with    Follow-up     6 month follow up        Davey Segura preferred language for health care discussion is english/other. Is someone accompanying this pt? no    Is the patient using any DME equipment during [address]? no    Depression Screening:  3 most recent PHQ Screens 7/14/2020   Little interest or pleasure in doing things Not at all   Feeling down, depressed, irritable, or hopeless Not at all   Total Score PHQ 2 0       Learning Assessment:  Learning Assessment 3/17/2017   PRIMARY LEARNER Patient   HIGHEST LEVEL OF EDUCATION - PRIMARY LEARNER  -   BARRIERS PRIMARY LEARNER -   CO-LEARNER CAREGIVER -   PRIMARY LANGUAGE ENGLISH   LEARNER PREFERENCE PRIMARY DEMONSTRATION   ANSWERED BY patient   RELATIONSHIP SELF       Abuse Screening:  Abuse Screening Questionnaire 7/31/2020   Do you ever feel afraid of your partner? N   Are you in a relationship with someone who physically or mentally threatens you? N   Is it safe for you to go home? Y       Pt currently taking Anticoagulant therapy? ASA 81mg every day     Coordination of Care:  1. Have you been to the ER, urgent care clinic since your last visit? Hospitalized since your last visit? no    2. Have you seen or consulted any other health care providers outside of the [address] [place] [address] since your last visit? Include any pap smears or colon screening.  no

## 2021-01-12 NOTE — PROGRESS NOTES
HISTORY OF PRESENT ILLNESS  Zelda Ramos is a 45 y.o. female. ASSESSMENT and PLAN    Ms. Maxine Klein has history of CAD. She presented back in December 2011 with atypical chest pains but abnormal EKG, and abnormal nuclear scan. Her coronary angiogram revealed ostial 90% LAD stenosis; FFR was performed with intrinsic value of 0.87 and adenosine induced 0.66. She subsequently underwent single vessel LIMA to LAD bypass surgery. Since her bypass surgery, she presented twice with chest pains to the emergency room which were again quite atypical. She had nuclear scans done both instances which were normal.  Back in November 2016, [facility].  Nuclear scan showed ejection fraction of 60% but apical filling defect.  Ultimately, repeat coronary angiography was performed.  It was noted that her HECTOR to LAD was widely patent. Again, in March 2020, she presented with chest pains (shoulder and jaw pain), and abnormal nuclear scan involving the basal anterior wall, with decline in EF to 40%. She underwent coronary angiography which revealed widely patent LIMA to LAD with ostial LAD moderate stenosis. Continue medical therapy was recommended.  CAD:    Symptomatically stable.  BP:    Well controlled.  Rhythm:    Asymptomatic sinus bradycardia, stable.  CHF:    Currently, there is no evidence of decompensated CHF noted.  Weight:    Her weight today is 273 pounds. Unfortunately, she has not been able lose any significant weight. She states that she tried low carbohydrate diet cutting out most breads, pasta, potatoes and rice. Unfortunately, she did not know that fruits were high in carbohydrates so she continue to eat high food content. She has also been eating significant amount of oatmeal for breakfast because she thought it was heart healthy. Lastly, she has been eating crackers for snacks.   Again, all these issues were discussed with the patient at length and all questions were answered.  Cholesterol:  Target LDL<70. Crestor 40.  Anticoagulant:  Remains on ASA. I will see her back in 6 months. Thank you. Encounter Diagnoses   Name Primary?  Coronary artery disease involving native coronary artery of native heart without angina pectoris Yes    Chest pain, unspecified type     Abnormal nuclear stress test     Abnormal EKG      current treatment plan is effective, no change in therapy  lab results and schedule of future lab studies reviewed with patient  reviewed diet, exercise and weight control  cardiovascular risk and specific lipid/LDL goals reviewed  use of aspirin to prevent MI and TIA's discussed      HPI   Today, Ms. Aida Vargas has no complaints of chest pains, increased shortness of breath or decreased exercise capacity. However, she has been quite distraught about the fact that she has not lost any weight despite the fact that she has gone low carbohydrate diet. On further discussion, she did cut out breads, pasta, potatoes and rice. Unfortunately, she did not cut out fruits, crackers, and oatmeal.  Therefore, despite the fact that she was on low carbohydrate, she still was eating significant amount of carbohydrates. She denies any orthopnea or PND. She denies any palpitations or dizziness. She has gone back to work and has not had any significant limitations. Review of Systems   Respiratory: Negative for shortness of breath. Cardiovascular: Negative for chest pain, palpitations, orthopnea, claudication, leg swelling and PND. All other systems reviewed and are negative. Physical Exam  Vitals signs and nursing note reviewed. Constitutional:       Appearance: Normal appearance. She is obese. HENT:      Head: Normocephalic. Eyes:      Conjunctiva/sclera: Conjunctivae normal.   Neck:      Musculoskeletal: No neck rigidity. Vascular: No carotid bruit. Cardiovascular:      Rate and Rhythm: Regular rhythm. Bradycardia present.   Pulmonary:      Breath sounds: Normal breath sounds.   Abdominal:      Palpations: Abdomen is soft.   Musculoskeletal:         General: No swelling.   Skin:     General: Skin is warm and dry.   Neurological:      General: No focal deficit present.      Mental Status: She is alert and oriented to person, place, and time.   Psychiatric:         Mood and Affect: Mood normal.         Behavior: Behavior normal.         PCP: Eliezer Alicia NP    Past Medical History:   Diagnosis Date   • Abnormal PFT 10/1/2015    Dr. Jose Schroeder, Pulmonology   • Acid reflux    • CABG x 1 2011    LIMA-LAD   • CAD (coronary artery disease)    • Cardiac cath 2016    R dom.  oLAD 85%.  Otherwise patent coronaries.  HECTOR to LAD patent.  LVEDP 22 mmHg.  EF 55%.  No RWMA.     • Cardiac echocardiogram 2015    Tech difficult.  EF 50-55%.  No WMA.  Mild conc LVH.  Gr 2 DDfx.  RVSP 45-50 mmHg.  Mild LAE.  Mild ROSITA.  Mod TR.  Mild IVCE.     • Cardiac nuclear imaging test, high risk 2016    High risk.  Mainly fixed anteroapical defect w/partial reversibility.  EF 60%.  No RWMA.  Nondiagnostic EKG on pharm stress test.   • Cardiovascular upper extremity arterial duplex 2014    No significant occlusive arterial disease bilaterally.   • Coronary atherosclerosis of native coronary artery    • Diabetes mellitus (HCC) 14    hgbA1C 6.8   • Diabetic eye exam ([facility])    • Dyslipidemia    • GERD (gastroesophageal reflux disease)    • H/O screening mammography 2016    no evidence of malignancy   • Hypercholesterolemia    • Hypomagnesemia 14    1.4   • Pleural effusion, left     s/p CABG and thoracentesis with removal of 900 mL   • Pulmonary arterial hypertension (HCC)        Past Surgical History:   Procedure Laterality Date   • CARDIAC CATHETERIZATION  2016        • CORONARY ARTERY ANGIOGRAM  2016        • HX  SECTION     • HX CORONARY ARTERY BYPASS GRAFT  2011  x1    LV ANGIOGRAPHY  11/23/2016         [place] CARDIAC SURG PROCEDURE UNLIST         Current Outpatient Medications   Medication Sig Dispense Refill    olmesartan-hydroCHLOROthiazide (BENICAR HCT) 20-12.5 mg per tablet Take 1 tablet by mouth once daily 30 Tab 0    carBAMazepine (TEGretol) 200 mg tablet Take 1 Tab by mouth two (2) times a day. 240 Tab 0    magnesium oxide (MAG-OX) 400 mg tablet Take 1 tablet by mouth once daily 90 Tab 0    aspirin delayed-release 81 mg tablet Take 1 tablet by mouth once daily 90 Tab 0    olmesartan-hydroCHLOROthiazide (BENICAR HCT) 20-12.5 mg per tablet Take 1 Tab by mouth daily. 30 Tab 6    naproxen sodium (ANAPROX) 550 mg tablet Take 1 Tab by mouth two (2) times daily (with meals). 30 Tab 1    metoprolol tartrate (LOPRESSOR) 25 mg tablet Take 0.5 Tabs by mouth two (2) times a day. 1 Tab 0    gabapentin (NEURONTIN) 300 mg capsule 2 caps QAM, 1 cap at noon, 1 cap at dinner, and 2 caps at HS 1 Cap 0    lidocaine (LIDODERM) 5 % APPLY 1 NEW PATCH TO SKIN FOR LEFT DORSAL PAIN UP TO 3 PATCHES EACH TIME Q 12 HOURS PRN.  nitroglycerin (NITROSTAT) 0.4 mg SL tablet DISSOLVE ONE TABLET UNDER THE TONGUE AT THE START OF CHEST PAIN EVERY 5 MINUTES UP TO 3 DOSES      DULoxetine (CYMBALTA) 60 mg capsule Take 1 Cap by mouth daily. 90 Cap 1    multivitamin (ONE A DAY) tablet Take 1 Tab by mouth daily. 30 Tab 11    omega-3 acid ethyl esters (LOVAZA) 1 gram capsule Take 1 Cap by mouth nightly.  80 Cap 3       The patient has a family history of    Social History     Tobacco Use    Smoking status: Never Smoker    Smokeless tobacco: Never Used    Tobacco comment: 2nd home smoking from mother during 1st 17 yrs of life   Substance Use Topics    Alcohol use: No     Alcohol/week: 1.0 standard drinks     Types: 1 Standard drinks or equivalent per week    Drug use: No       Lab Results   Component Value Date/Time    Cholesterol, total 261 (H) 01/28/2020 08:21 AM    HDL Cholesterol 60 01/28/2020 08:21 AM    LDL, calculated 180.6 (H) 01/28/2020 08:21 AM    Triglyceride 102 01/28/2020 08:21 AM    CHOL/HDL Ratio 4.4 01/28/2020 08:21 AM        BP Readings from Last 3 Encounters:   01/12/21 128/60   12/02/20 114/70   07/31/20 111/56        Pulse Readings from Last 3 Encounters:   01/12/21 (!) 58   12/02/20 60   07/31/20 (!) 57       Wt Readings from Last 3 Encounters:   01/12/21 123.8 kg (273 lb)   12/02/20 122.7 kg (270 lb 9.6 oz)   07/31/20 123.9 kg (273 lb 3.2 oz)         EKG: unchanged from previous tracings, sinus bradycardia, anterolateral T wave inversion.

## 2021-02-15 ENCOUNTER — OFFICE VISIT (OUTPATIENT)
Dept: FAMILY MEDICINE CLINIC | Age: 51
End: 2021-02-15
Payer: COMMERCIAL

## 2021-02-15 VITALS
TEMPERATURE: 96.9 F | BODY MASS INDEX: 47.84 KG/M2 | OXYGEN SATURATION: 93 % | HEART RATE: 62 BPM | HEIGHT: 63 IN | DIASTOLIC BLOOD PRESSURE: 75 MMHG | SYSTOLIC BLOOD PRESSURE: 117 MMHG | WEIGHT: 270 LBS

## 2021-02-15 DIAGNOSIS — R73.09 ELEVATED HEMOGLOBIN A1C: ICD-10-CM

## 2021-02-15 DIAGNOSIS — H92.02 LEFT EAR PAIN: ICD-10-CM

## 2021-02-15 DIAGNOSIS — I10 ESSENTIAL HYPERTENSION: Primary | ICD-10-CM

## 2021-02-15 DIAGNOSIS — F40.240 CLAUSTROPHOBIA: ICD-10-CM

## 2021-02-15 PROCEDURE — 99214 OFFICE O/P EST MOD 30 MIN: CPT | Performed by: FAMILY MEDICINE

## 2021-02-15 RX ORDER — DIAZEPAM 5 MG/1
TABLET ORAL
Qty: 2 TAB | Refills: 0 | Status: SHIPPED | OUTPATIENT
Start: 2021-02-15 | End: 2021-12-22

## 2021-02-15 NOTE — PATIENT INSTRUCTIONS
Earache: Care Instructions Your Care Instructions Even though infection is a common cause of ear pain, not all ear pain means an infection. If you have ear pain and don't have an infection, it could be because of a jaw problem, such as temporomandibular joint (TMJ) pain. Or it could be because of a neck problem. When ear discomfort or pain is mild or comes and goes without other symptoms, home treatment may be all you need. Follow-up care is a key part of your treatment and safety. Be sure to make and go to all appointments, and call your doctor if you are having problems. It's also a good idea to know your test results and keep a list of the medicines you take. How can you care for yourself at home? · Apply heat on the ear to ease pain. To apply heat, put a warm water bottle, a heating pad set on low, or a warm cloth on your ear. Do not go to sleep with a heating pad on your skin. · Take an over-the-counter pain medicine, such as acetaminophen (Tylenol), ibuprofen (Advil, Motrin), or naproxen (Aleve). Be safe with medicines. Read and follow all instructions on the label. · Do not take two or more pain medicines at the same time unless the doctor told you to. Many pain medicines have acetaminophen, which is Tylenol. Too much acetaminophen (Tylenol) can be harmful. · Never insert anything, such as a cotton swab or a suzie pin, into the ear. When should you call for help? Call your doctor now or seek immediate medical care if: 
  · You have new or worse symptoms of infection, such as: 
? Increased pain, swelling, warmth, or redness. ? Red streaks leading from the area. ? Pus draining from the area. ? A fever. Watch closely for changes in your health, and be sure to contact your doctor if: 
  · You have new or worse discharge coming from the ear.  
  · You do not get better as expected. Where can you learn more? Go to http://www.[employer].com/ Enter R140 in the search box to learn more about \"Earache: Care Instructions. \" Current as of: April 15, 2020               Content Version: 12.6 © 4132-7366 [employer]. Care instructions adapted under license by [employer] (which disclaims liability or warranty for this information). If you have questions about a medical condition or this instruction, always ask your healthcare professional. [address] any warranty or liability for your use of this information.

## 2021-02-15 NOTE — PROGRESS NOTES
Chief Complaint   Patient presents with    Follow Up Appointment     Ear, right arm pain     1. Have you been to the ER, urgent care clinic since your last visit? Hospitalized since your last visit? No    2. Have you seen or consulted any other health care providers outside of the [address] [place] [address] since your last visit? Include any pap smears or colon screening.  No

## 2021-02-15 NOTE — PROGRESS NOTES
HPI:  Sue Park is a 45 y.o. female who presents today with   Chief Complaint   Patient presents with    Follow Up Appointment     Ear, right arm pain        C/o left ear pain; had an attempt at an MRI of the ear but she is claustrophobic and was unable to have the MRI; She was placed on tegretol; she feels pain down in her ear. The tegretol did help some of the pain. She still wants the MRI but may need something to relax her more;  Sleeps with cotton in her ear. Notes a soreness under her arm and a Tenderness to her left upper chest wall. Has discussed with cardiology      Her BP is stable  She has had an elevated blood sugar; needs a recheck on her labs.   Reports stressors    Wt Readings from Last 3 Encounters:   02/15/21 270 lb (122.5 kg)   01/12/21 273 lb (123.8 kg)   12/02/20 270 lb 9.6 oz (122.7 kg)     She has             3 most recent PHQ Screens 2/15/2021   Little interest or pleasure in doing things Nearly every day   Feeling down, depressed, irritable, or hopeless Nearly every day   Total Score PHQ 2 6   Trouble falling or staying asleep, or sleeping too much Several days   Feeling tired or having little energy Nearly every day   Poor appetite, weight loss, or overeating Not at all   Feeling bad about yourself - or that you are a failure or have let yourself or your family down Nearly every day   Trouble concentrating on things such as school, work, reading, or watching TV Not at all   Moving or speaking so slowly that other people could have noticed; or the opposite being so fidgety that others notice Not at all   Thoughts of being better off dead, or hurting yourself in some way Not at all   PHQ 9 Score 13   How difficult have these problems made it for you to do your work, take care of your home and get along with others Somewhat difficult               PMH,  Meds, Allergies, Family History, Social history reviewed      Current Outpatient Medications   Medication Sig Dispense Refill  diazePAM (Valium) 5 mg tablet Take one tablet by mouth 30 minutes before procedure 2 Tab 0    carBAMazepine (TEGretol) 200 mg tablet Take 1 Tab by mouth two (2) times a day. 240 Tab 0    magnesium oxide (MAG-OX) 400 mg tablet Take 1 tablet by mouth once daily 90 Tab 0    aspirin delayed-release 81 mg tablet Take 1 tablet by mouth once daily 90 Tab 0    metoprolol tartrate (LOPRESSOR) 25 mg tablet Take 0.5 Tabs by mouth two (2) times a day. 1 Tab 0    gabapentin (NEURONTIN) 300 mg capsule 2 caps QAM, 1 cap at noon, 1 cap at dinner, and 2 caps at HS 1 Cap 0    lidocaine (LIDODERM) 5 % APPLY 1 NEW PATCH TO SKIN FOR LEFT DORSAL PAIN UP TO 3 PATCHES EACH TIME Q 12 HOURS PRN.  nitroglycerin (NITROSTAT) 0.4 mg SL tablet DISSOLVE ONE TABLET UNDER THE TONGUE AT THE START OF CHEST PAIN EVERY 5 MINUTES UP TO 3 DOSES      DULoxetine (CYMBALTA) 60 mg capsule Take 1 Cap by mouth daily. 90 Cap 1    multivitamin (ONE A DAY) tablet Take 1 Tab by mouth daily. 30 Tab 11    omega-3 acid ethyl esters (LOVAZA) 1 gram capsule Take 1 Cap by mouth nightly. 90 Cap 3        Allergies   Allergen Reactions    Contrave [Naltrexone-Bupropion] Other (comments)     Severe constipation    Metformin Nausea Only                  Review of Systems   Constitutional: Negative for chills and fever. Respiratory: Negative for shortness of breath and wheezing. Cardiovascular: Negative for chest pain and palpitations.          Visit Vitals  /75 (BP 1 Location: Left upper arm)   Pulse 62   Temp 96.9 °F (36.1 °C)   Ht 5' 3\" (1.6 m)   Wt 270 lb (122.5 kg)   SpO2 93%   BMI 47.83 kg/m²     Physical Exam   Visit Vitals  /75 (BP 1 Location: Left upper arm)   Pulse 62   Temp 96.9 °F (36.1 °C)   Ht 5' 3\" (1.6 m)   Wt 270 lb (122.5 kg)   SpO2 93%   BMI 47.83 kg/m²     TMs clear essentially  General appearance: alert, cooperative, no distress, appears stated age  Neck: supple, symmetrical, trachea midline, no adenopathy, thyroid: not enlarged, symmetric, no tenderness/mass/nodules, no carotid bruit and no JVD  Lungs: clear to auscultation bilaterally  Heart: regular rate and rhythm, S1, S2 normal, no murmur, click, rub or gallop  Extremities: extremities normal, atraumatic, no cyanosis or edema      Assessment/Plan:    Diagnoses and all orders for this visit:    1. Essential hypertension  -     TSH 3RD GENERATION; Future  -     METABOLIC PANEL, COMPREHENSIVE; Future  -     LIPID PANEL; Future    2. Elevated hemoglobin A1c  -     HEMOGLOBIN A1C WITH EAG; Future    3. Claustrophobia  -     diazePAM (Valium) 5 mg tablet; Take one tablet by mouth 30 minutes before procedure    4. Left ear pain  -     REFERRAL TO ENT-OTOLARYNGOLOGY    as above, all not completely controlled  Valium for prior to MRI  Labs as ordered  Follow-up and Dispositions    · Return in about 3 months (around 5/15/2021) for ear pain, DM. This has been fully explained to the patient, who indicates understanding. An After Visit Summary was printed and given to the patient. Follow-up and Dispositions    · Return in about 3 months (around 5/15/2021) for ear pain, DM.             Sulma Flores MD

## 2021-02-18 ENCOUNTER — HOSPITAL ENCOUNTER (OUTPATIENT)
Dept: LAB | Age: 51
Discharge: HOME | End: 2021-02-18
Payer: COMMERCIAL

## 2021-02-18 ENCOUNTER — APPOINTMENT (OUTPATIENT)
Dept: FAMILY MEDICINE CLINIC | Age: 51
End: 2021-02-18

## 2021-02-18 DIAGNOSIS — I10 ESSENTIAL HYPERTENSION: ICD-10-CM

## 2021-02-18 DIAGNOSIS — R73.09 ELEVATED HEMOGLOBIN A1C: ICD-10-CM

## 2021-02-18 LAB
ALBUMIN SERPL-MCNC: 3.8 G/DL (ref 3.4–5)
ALBUMIN/GLOB SERPL: 1 {RATIO} (ref 0.8–1.7)
ALP SERPL-CCNC: 249 U/L (ref 45–117)
ALT SERPL-CCNC: 66 U/L (ref 13–56)
ANION GAP SERPL CALC-SCNC: 6 MMOL/L (ref 3–18)
AST SERPL-CCNC: 41 U/L (ref 10–38)
BILIRUB SERPL-MCNC: 0.3 MG/DL (ref 0.2–1)
BUN SERPL-MCNC: 14 MG/DL (ref 7–18)
BUN/CREAT SERPL: 18 (ref 12–20)
CALCIUM SERPL-MCNC: 8.8 MG/DL (ref 8.5–10.1)
CHLORIDE SERPL-SCNC: 105 MMOL/L (ref 100–111)
CHOLEST SERPL-MCNC: 309 MG/DL
CO2 SERPL-SCNC: 28 MMOL/L (ref 21–32)
CREAT SERPL-MCNC: 0.78 MG/DL (ref 0.6–1.3)
EST. AVERAGE GLUCOSE BLD GHB EST-MCNC: 143 MG/DL
GLOBULIN SER CALC-MCNC: 3.8 G/DL (ref 2–4)
GLUCOSE SERPL-MCNC: 101 MG/DL (ref 74–99)
HBA1C MFR BLD: 6.6 % (ref 4.2–5.6)
HDLC SERPL-MCNC: 77 MG/DL (ref 40–60)
HDLC SERPL: 4 {RATIO} (ref 0–5)
LDLC SERPL CALC-MCNC: 212.4 MG/DL (ref 0–100)
LIPID PROFILE,FLP: ABNORMAL
POTASSIUM SERPL-SCNC: 4.1 MMOL/L (ref 3.5–5.5)
PROT SERPL-MCNC: 7.6 G/DL (ref 6.4–8.2)
SODIUM SERPL-SCNC: 139 MMOL/L (ref 136–145)
TRIGL SERPL-MCNC: 98 MG/DL (ref ?–150)
TSH SERPL DL<=0.05 MIU/L-ACNC: 1.16 UIU/ML (ref 0.36–3.74)
VLDLC SERPL CALC-MCNC: 19.6 MG/DL

## 2021-02-18 PROCEDURE — 36415 COLL VENOUS BLD VENIPUNCTURE: CPT

## 2021-02-18 PROCEDURE — 83036 HEMOGLOBIN GLYCOSYLATED A1C: CPT

## 2021-02-18 PROCEDURE — 80053 COMPREHEN METABOLIC PANEL: CPT

## 2021-02-18 PROCEDURE — 80061 LIPID PANEL: CPT

## 2021-02-18 PROCEDURE — 84443 ASSAY THYROID STIM HORMONE: CPT

## 2021-02-22 DIAGNOSIS — E83.42 HYPOMAGNESEMIA: ICD-10-CM

## 2021-02-22 DIAGNOSIS — G47.62 NOCTURNAL LEG CRAMPS: ICD-10-CM

## 2021-02-22 RX ORDER — METOPROLOL TARTRATE 25 MG/1
12.5 TABLET, FILM COATED ORAL 2 TIMES DAILY
Qty: 30 TAB | Refills: 5 | Status: SHIPPED | OUTPATIENT
Start: 2021-02-22 | End: 2021-06-15

## 2021-02-22 NOTE — TELEPHONE ENCOUNTER
Last Visit: 2/15/2021 with MD Prasad  Next Appointment: 5/12/2021 with MD Prasad  Previous Refill Encounter(s): 11/25/2020 per MD Prasad #[id number]     Requested Prescriptions     Pending Prescriptions Disp Refills   • magnesium oxide (MAG-OX) 400 mg tablet 90 Tab 0     Sig: Take 1 Tab by mouth daily.

## 2021-02-23 DIAGNOSIS — G50.0 TRIGEMINAL NEURALGIA OF LEFT SIDE OF FACE: ICD-10-CM

## 2021-02-23 DIAGNOSIS — E83.42 HYPOMAGNESEMIA: ICD-10-CM

## 2021-02-23 DIAGNOSIS — G47.62 NOCTURNAL LEG CRAMPS: ICD-10-CM

## 2021-02-26 RX ORDER — CARBAMAZEPINE 200 MG/1
TABLET ORAL
Qty: 60 TAB | Refills: 1 | Status: SHIPPED | OUTPATIENT
Start: 2021-02-26 | End: 2021-03-27

## 2021-02-26 RX ORDER — LANOLIN ALCOHOL/MO/W.PET/CERES
CREAM (GRAM) TOPICAL
Qty: 90 TAB | Refills: 0 | Status: SHIPPED | OUTPATIENT
Start: 2021-02-26 | End: 2021-12-05 | Stop reason: SDUPTHER

## 2021-02-26 RX ORDER — LANOLIN ALCOHOL/MO/W.PET/CERES
400 CREAM (GRAM) TOPICAL DAILY
Qty: 90 TAB | Refills: 0 | Status: SHIPPED | OUTPATIENT
Start: 2021-02-26 | End: 2021-05-24 | Stop reason: SDUPTHER

## 2021-03-23 DIAGNOSIS — G50.0 TRIGEMINAL NEURALGIA OF LEFT SIDE OF FACE: ICD-10-CM

## 2021-03-27 RX ORDER — CARBAMAZEPINE 200 MG/1
TABLET ORAL
Qty: 60 TAB | Refills: 1 | Status: SHIPPED | OUTPATIENT
Start: 2021-03-27 | End: 2021-03-29 | Stop reason: SDUPTHER

## 2021-03-29 DIAGNOSIS — G50.0 TRIGEMINAL NEURALGIA OF LEFT SIDE OF FACE: ICD-10-CM

## 2021-03-30 NOTE — TELEPHONE ENCOUNTER
Insurance requires a minimum fill for 90 days    Last Visit: 2/15/21 with MD Kymberly Harvey  Next Appointment: 5/12/21 with MD Kymberly Harvey    Requested Prescriptions     Pending Prescriptions Disp Refills    carBAMazepine (TEGretol) 200 mg tablet 180 Tab 0     Sig: Take 1 Tab by mouth two (2) times a day.

## 2021-04-02 RX ORDER — CARBAMAZEPINE 200 MG/1
200 TABLET ORAL 2 TIMES DAILY
Qty: 180 TAB | Refills: 0 | Status: SHIPPED | OUTPATIENT
Start: 2021-04-02 | End: 2021-07-02

## 2021-04-09 ENCOUNTER — HOSPITAL ENCOUNTER (OUTPATIENT)
Dept: MRI IMAGING | Age: 51
Discharge: HOME | End: 2021-04-09
Attending: NURSE PRACTITIONER
Payer: COMMERCIAL

## 2021-04-09 LAB — CREAT UR-MCNC: 0.9 MG/DL (ref 0.6–1.3)

## 2021-04-09 PROCEDURE — A9577 INJ MULTIHANCE: HCPCS | Performed by: NURSE PRACTITIONER

## 2021-04-09 PROCEDURE — 70543 MRI ORBT/FAC/NCK W/O &W/DYE: CPT

## 2021-04-09 PROCEDURE — 82565 ASSAY OF CREATININE: CPT

## 2021-04-09 PROCEDURE — 74011250636 HC RX REV CODE- 250/636: Performed by: NURSE PRACTITIONER

## 2021-04-09 RX ADMIN — GADOBENATE DIMEGLUMINE 20 ML: 529 INJECTION, SOLUTION INTRAVENOUS at 14:51

## 2021-04-16 ENCOUNTER — TELEPHONE (OUTPATIENT)
Dept: FAMILY MEDICINE CLINIC | Age: 51
End: 2021-04-16

## 2021-04-16 NOTE — TELEPHONE ENCOUNTER
Pt called stating that she is experiencing fever and body aches due to covid vaccine that she received yesterday.         Please advise  [phone]

## 2021-04-16 NOTE — TELEPHONE ENCOUNTER
Spoke with pt regarding complaints of covid vaccine symptoms. Pt stated that she wanted to request an appointment for today. Pt was notified that there currently  isn't any availability. Advised pt to visit ER or urgent care.

## 2021-05-12 ENCOUNTER — OFFICE VISIT (OUTPATIENT)
Dept: FAMILY MEDICINE CLINIC | Age: 51
End: 2021-05-12
Payer: COMMERCIAL

## 2021-05-12 VITALS
SYSTOLIC BLOOD PRESSURE: 125 MMHG | OXYGEN SATURATION: 93 % | DIASTOLIC BLOOD PRESSURE: 81 MMHG | WEIGHT: 269 LBS | HEART RATE: 69 BPM | HEIGHT: 63 IN | RESPIRATION RATE: 24 BRPM | BODY MASS INDEX: 47.66 KG/M2

## 2021-05-12 DIAGNOSIS — G50.0 TRIGEMINAL NEURALGIA OF LEFT SIDE OF FACE: Primary | ICD-10-CM

## 2021-05-12 DIAGNOSIS — M65.311 TRIGGER FINGER OF RIGHT THUMB: ICD-10-CM

## 2021-05-12 PROCEDURE — 99213 OFFICE O/P EST LOW 20 MIN: CPT | Performed by: FAMILY MEDICINE

## 2021-05-12 RX ORDER — OLMESARTAN MEDOXOMIL AND HYDROCHLOROTHIAZIDE 20/12.5 20; 12.5 MG/1; MG/1
TABLET ORAL
COMMUNITY
Start: 2021-03-23 | End: 2021-06-25 | Stop reason: SDUPTHER

## 2021-05-12 NOTE — PROGRESS NOTES
HPI:  Galina Ibanez is a 45 y.o. female who presents today with   Chief Complaint   Patient presents with    Other     ear pain f/u and thumb joint issues        Pt state her ear sx are better; she was diagnosed with trigeminal neuralgia. Her MRI was essentially  wnl. This has been reviewed with her today. She continues on Tegretol. This has been helpful. She does not need any refills    She was diagnosed with trigeminal neuralgia for the above sx. tegretol has been helpful. C/o Trigger finger in the right thumb. Hurts when she bends the thumb.             3 most recent PHQ Screens 5/12/2021   Little interest or pleasure in doing things Several days   Feeling down, depressed, irritable, or hopeless Several days   Total Score PHQ 2 2   Trouble falling or staying asleep, or sleeping too much -   Feeling tired or having little energy -   Poor appetite, weight loss, or overeating -   Feeling bad about yourself - or that you are a failure or have let yourself or your family down -   Trouble concentrating on things such as school, work, reading, or watching TV -   Moving or speaking so slowly that other people could have noticed; or the opposite being so fidgety that others notice -   Thoughts of being better off dead, or hurting yourself in some way -   PHQ 9 Score -   How difficult have these problems made it for you to do your work, take care of your home and get along with others -               PMH,  Meds, Allergies, Family History, Social history reviewed      Current Outpatient Medications   Medication Sig Dispense Refill    carBAMazepine (TEGretol) 200 mg tablet Take 1 Tab by mouth two (2) times a day. 180 Tab 0    magnesium oxide (MAG-OX) 400 mg tablet Take 1 Tab by mouth daily. 90 Tab 0    magnesium oxide (MAG-OX) 400 mg tablet Take 1 tablet by mouth once daily 90 Tab 0    metoprolol tartrate (LOPRESSOR) 25 mg tablet Take 0.5 Tabs by mouth two (2) times a day.  30 Tab 5    diazePAM (Valium) 5 mg tablet Take one tablet by mouth 30 minutes before procedure 2 Tab 0    aspirin delayed-release 81 mg tablet Take 1 tablet by mouth once daily 90 Tab 0    gabapentin (NEURONTIN) 300 mg capsule 2 caps QAM, 1 cap at noon, 1 cap at dinner, and 2 caps at HS 1 Cap 0    lidocaine (LIDODERM) 5 % APPLY 1 NEW PATCH TO SKIN FOR LEFT DORSAL PAIN UP TO 3 PATCHES EACH TIME Q 12 HOURS PRN.  nitroglycerin (NITROSTAT) 0.4 mg SL tablet DISSOLVE ONE TABLET UNDER THE TONGUE AT THE START OF CHEST PAIN EVERY 5 MINUTES UP TO 3 DOSES      DULoxetine (CYMBALTA) 60 mg capsule Take 1 Cap by mouth daily. 90 Cap 1    multivitamin (ONE A DAY) tablet Take 1 Tab by mouth daily. 30 Tab 11    omega-3 acid ethyl esters (LOVAZA) 1 gram capsule Take 1 Cap by mouth nightly. 90 Cap 3    olmesartan-hydroCHLOROthiazide (BENICAR HCT) 20-12.5 mg per tablet TAKE 1 TABLET BY MOUTH EVERY DAY          Allergies   Allergen Reactions    Contrave [Naltrexone-Bupropion] Other (comments)     Severe constipation    Metformin Nausea Only                  Review of Systems   Constitutional: Negative for chills and fever. Respiratory: Negative for shortness of breath and wheezing. Cardiovascular: Negative for chest pain and palpitations. Visit Vitals  /81   Pulse 69   Resp 24   Ht 5' 3\" (1.6 m)   Wt 269 lb (122 kg)   SpO2 93%   BMI 47.65 kg/m²     Physical Exam   General appearance: alert, cooperative, no distress, appears stated age  Neck: supple, symmetrical, trachea midline, no adenopathy, thyroid: not enlarged, symmetric, no tenderness/mass/nodules, no carotid bruit and no JVD; TMs clear  Lungs: clear to auscultation bilaterally  Heart: regular rate and rhythm, S1, S2 normal, no murmur, click, rub or gallop  Right thumb: Obvious trigger motion noted with active flexion of the PIP joint      Assessment/Plan:  Diagnoses and all orders for this visit:    1. Trigeminal neuralgia of left side of face    2.  Trigger finger of right thumb      As above  Patient defers referral to Ortho at this time for her trigger finger. She will notify MD if her symptoms worsen. Alarm signs for worsening symptoms revert reviewed. Continue Tegretol for trigeminal neuralgia    Follow-up and Dispositions    · Return in about 4 months (around 9/12/2021). An After Visit Summary was printed and given to the patient. This has been fully explained to the patient, who indicates understanding.         Suann Bence, MD

## 2021-05-12 NOTE — PROGRESS NOTES
Lilibeth Tate presents today for   Chief Complaint   Patient presents with    Other     ear pain f/u and thumb joint issues       Is someone accompanying this pt? No    Is the patient using any DME equipment during OV? No    Depression Screening:  3 most recent PHQ Screens 5/12/2021   Little interest or pleasure in doing things Several days   Feeling down, depressed, irritable, or hopeless Several days   Total Score PHQ 2 2   Trouble falling or staying asleep, or sleeping too much -   Feeling tired or having little energy -   Poor appetite, weight loss, or overeating -   Feeling bad about yourself - or that you are a failure or have let yourself or your family down -   Trouble concentrating on things such as school, work, reading, or watching TV -   Moving or speaking so slowly that other people could have noticed; or the opposite being so fidgety that others notice -   Thoughts of being better off dead, or hurting yourself in some way -   PHQ 9 Score -   How difficult have these problems made it for you to do your work, take care of your home and get along with others -       Learning Assessment:  Learning Assessment 3/17/2017   PRIMARY LEARNER Patient   HIGHEST LEVEL OF EDUCATION - PRIMARY LEARNER  -   BARRIERS PRIMARY LEARNER -   CO-LEARNER CAREGIVER -   PRIMARY LANGUAGE ENGLISH   LEARNER PREFERENCE PRIMARY DEMONSTRATION   ANSWERED BY patient   RELATIONSHIP SELF       Travel Screening:    Travel Screening     Question   Response    In the last month, have you been in contact with someone who was confirmed or suspected to have Coronavirus / COVID-19? No / Unsure    Have you had a COVID-19 viral test in the last 14 days? No    Do you have any of the following new or worsening symptoms? None of these    Have you traveled internationally or domestically in the last month?   No      Travel History   Travel since 04/12/21     No documented travel since 04/12/21          Health Maintenance reviewed and discussed and ordered per Provider. Health Maintenance Due   Topic Date Due    COVID-19 Vaccine (1) Never done    Shingrix Vaccine Age 50> (1 of 2) Never done    Pneumococcal 0-64 years (1 of 1 - PPSV23) 11/26/2019    PAP AKA CERVICAL CYTOLOGY  03/17/2020    MICROALBUMIN Q1  10/01/2020    Foot Exam Q1  01/28/2021   . Coordination of Care:  1. Have you been to the ER, urgent care clinic since your last visit? Hospitalized since your last visit? No    2. Have you seen or consulted any other health care providers outside of the [address] [place] [address] since your last visit? Include any pap smears or colon screening.  No

## 2021-05-12 NOTE — PATIENT INSTRUCTIONS
Trigger Finger: Care Instructions Overview A trigger finger is a finger stuck in a bent position. It happens when the tendon that bends and straightens the thumb or finger can't slide smoothly under the ligaments that hold the tendon against the bones. In most cases, it's caused by a bump (nodule) that forms on the tendon. The bent finger usually straightens out on its own. A trigger finger can be painful. But it normally isn't a serious problem. Trigger fingers seem to occur more in some groups of people. These groups include: · People who have diabetes or arthritis. · People who have injured their hands in the past. 
· Musicians. · People who  tools often. Rest and exercises may help your finger relax so that it can bend. You may get a corticosteroid shot. This can reduce swelling and pain. Your doctor may put a splint on your finger. It will give your finger some rest. You may need surgery if the finger keeps locking in a bent position. Follow-up care is a key part of your treatment and safety. Be sure to make and go to all appointments, and call your doctor if you are having problems. It's also a good idea to know your test results and keep a list of the medicines you take. How can you care for yourself at home? · If your doctor put a splint on your finger, wear it as directed. Don't take it off until your doctor says you can. · You may need to change your activities to avoid movements that irritate the finger. · Take your medicines exactly as prescribed. Call your doctor if you think you are having a problem with your medicine. · Ask your doctor if you can take an over-the-counter pain medicine, such as acetaminophen (Tylenol), ibuprofen (Advil, Motrin), or naproxen (Aleve). Be safe with medicines. Read and follow all instructions on the label. · If your doctor recommends exercises, do them as directed. When should you call for help?  
 Call your doctor now or seek immediate medical care if: 
  · Your finger locks in a bent position and will not straighten. Watch closely for changes in your health, and be sure to contact your doctor if: 
  · You do not get better as expected. Where can you learn more? Go to http://www.ferris.com/ Enter M826 in the search box to learn more about \"Trigger Finger: Care Instructions. \" Current as of: November 16, 2020               Content Version: 12.8 © 2006-2021 [employer]. Care instructions adapted under license by [employer] (which disclaims liability or warranty for this information). If you have questions about a medical condition or this instruction, always ask your healthcare professional. [address] any warranty or liability for your use of this information.

## 2021-05-24 DIAGNOSIS — G47.62 NOCTURNAL LEG CRAMPS: ICD-10-CM

## 2021-05-24 DIAGNOSIS — E83.42 HYPOMAGNESEMIA: ICD-10-CM

## 2021-05-25 NOTE — TELEPHONE ENCOUNTER
Last Visit: 5/12/21 with MD Andriy Bourne  Next Appointment: 9/15/21 with MD Andriy Bourne  Previous Refill Encounter(s): 11/25/20 ASA #90, 2/26/21 Mag #90    Requested Prescriptions     Pending Prescriptions Disp Refills    aspirin delayed-release 81 mg tablet 90 Tablet 1     Sig: Take 1 Tablet by mouth daily.  magnesium oxide (MAG-OX) 400 mg tablet 90 Tablet 1     Sig: Take 1 Tablet by mouth daily.

## 2021-05-28 RX ORDER — LANOLIN ALCOHOL/MO/W.PET/CERES
400 CREAM (GRAM) TOPICAL DAILY
Qty: 90 TABLET | Refills: 1 | Status: SHIPPED | OUTPATIENT
Start: 2021-05-28 | End: 2021-10-28 | Stop reason: SDUPTHER

## 2021-05-28 RX ORDER — ASPIRIN 81 MG/1
81 TABLET ORAL DAILY
Qty: 90 TABLET | Refills: 1 | Status: SHIPPED | OUTPATIENT
Start: 2021-05-28 | End: 2021-10-08

## 2021-06-25 DIAGNOSIS — G50.0 TRIGEMINAL NEURALGIA OF LEFT SIDE OF FACE: ICD-10-CM

## 2021-07-02 RX ORDER — CARBAMAZEPINE 200 MG/1
TABLET ORAL
Qty: 180 TABLET | Refills: 0 | Status: SHIPPED | OUTPATIENT
Start: 2021-07-02 | End: 2021-09-27

## 2021-07-02 RX ORDER — OLMESARTAN MEDOXOMIL AND HYDROCHLOROTHIAZIDE 20/12.5 20; 12.5 MG/1; MG/1
1 TABLET ORAL DAILY
Qty: 90 TABLET | Refills: 1 | Status: SHIPPED | OUTPATIENT
Start: 2021-07-02 | End: 2021-07-27

## 2021-07-05 DIAGNOSIS — G50.0 TRIGEMINAL NEURALGIA OF LEFT SIDE OF FACE: ICD-10-CM

## 2021-07-05 RX ORDER — CARBAMAZEPINE 200 MG/1
TABLET ORAL
Qty: 180 TABLET | Refills: 0 | Status: CANCELLED | OUTPATIENT
Start: 2021-07-05

## 2021-07-27 ENCOUNTER — OFFICE VISIT (OUTPATIENT)
Dept: CARDIOLOGY CLINIC | Age: 51
End: 2021-07-27
Payer: COMMERCIAL

## 2021-07-27 VITALS
OXYGEN SATURATION: 97 % | DIASTOLIC BLOOD PRESSURE: 80 MMHG | WEIGHT: 267 LBS | SYSTOLIC BLOOD PRESSURE: 132 MMHG | HEIGHT: 63 IN | BODY MASS INDEX: 47.31 KG/M2 | HEART RATE: 57 BPM

## 2021-07-27 DIAGNOSIS — I25.10 CORONARY ARTERY DISEASE INVOLVING NATIVE CORONARY ARTERY OF NATIVE HEART WITHOUT ANGINA PECTORIS: Primary | ICD-10-CM

## 2021-07-27 PROCEDURE — 99214 OFFICE O/P EST MOD 30 MIN: CPT | Performed by: INTERNAL MEDICINE

## 2021-07-27 PROCEDURE — 93000 ELECTROCARDIOGRAM COMPLETE: CPT | Performed by: INTERNAL MEDICINE

## 2021-07-27 NOTE — PROGRESS NOTES
HISTORY OF PRESENT ILLNESS  Maksim Win is a 45 y.o. female. ASSESSMENT and PLAN    Ms. Maria Del Carmen Alonso has history of CAD. She presented back in December 2011 with atypical chest pains but abnormal EKG, and abnormal nuclear scan. Her coronary angiogram revealed ostial 90% LAD stenosis; FFR was performed with intrinsic value of 0.87 and adenosine induced 0.66. She subsequently underwent single vessel LIMA to LAD bypass surgery. Since her bypass surgery, she presented twice with chest pains to the emergency room which were again quite atypical. She had nuclear scans done both instances which were normal.  [facility]  Nuclear scan showed ejection fraction of 60% but apical filling defect.  Ultimately, repeat coronary angiography was performed.  It was noted that her HECTOR to LAD was widely patent.    Again, in March 2020, she presented with chest pains (shoulder and jaw pain), and abnormal nuclear scan involving the basal anterior wall, with decline in EF to 40%.  She underwent coronary angiography which revealed widely patent LIMA to LAD with ostial LAD moderate stenosis.  Continue medical therapy was recommended. · CAD:    Symptomatically stable. · BP:    Well controlled. · Rhythm:    Asymptomatic sinus bradycardia. · CHF:    There is no evidence of decompensated CHF noted. · Weight:     Her weight today is 267 pounds. Her previous weight was 273 pounds. Her target weight should be around 200 pounds at this time. Again, strong encouragement was given for weight control. I advised her to try to lose about 6-10 pounds every 6 months. · Cholesterol:   Target LDL <70. Crestor 40. · Anti-platelet:   Remains on ASA. I will see her back in 6 months. Thank you. Encounter Diagnoses   Name Primary?     Coronary artery disease involving native coronary artery of native heart without angina pectoris Yes     current treatment plan is effective, no change in therapy  lab results and schedule of future lab studies reviewed with patient  reviewed diet, exercise and weight control  cardiovascular risk and specific lipid/LDL goals reviewed  use of aspirin to prevent MI and TIA's discussed      HPI   Today, Ms. Van Knapp has no complaints of chest pains, increased shortness of breath or decreased exercise capacity. She denies any orthopnea or PND. She denies any palpitations or dizziness. With careful dieting, she has lost about 6 pounds in last 6 months. Continue weight control is encouraged. This is likely a previous issue. Review of Systems   Respiratory: Negative for shortness of breath. Cardiovascular: Negative for chest pain, palpitations, orthopnea, claudication, leg swelling and PND. All other systems reviewed and are negative. Physical Exam  Vitals and nursing note reviewed. Constitutional:       Appearance: She is obese. HENT:      Head: Normocephalic. Eyes:      Conjunctiva/sclera: Conjunctivae normal.   Neck:      Vascular: No carotid bruit. Cardiovascular:      Rate and Rhythm: Regular rhythm. Bradycardia present. Pulmonary:      Breath sounds: Normal breath sounds. Abdominal:      Palpations: Abdomen is soft. Musculoskeletal:         General: No swelling. Cervical back: No rigidity. Skin:     General: Skin is warm and dry. Neurological:      General: No focal deficit present. Mental Status: She is oriented to person, place, and time. Psychiatric:         Mood and Affect: Mood normal.         Behavior: Behavior normal.         PCP: William Ojeda MD    Past Medical History:   Diagnosis Date    Abnormal PFT 10/1/2015    Dr. Chris Schroeder, Pulmonology    Acid reflux     CABG x 1 12/31/2011    LIMA-LAD    CAD (coronary artery disease)     Cardiac cath 11/23/2016    R dom. oLAD 85%. Otherwise patent coronaries. HECTOR to LAD patent. LVEDP 22 mmHg. EF 55%. No RWMA.       Cardiac echocardiogram 2015    Tech difficult. EF 50-55%. No WMA. Mild conc LVH. Gr 2 DDfx. RVSP 45-50 mmHg. Mild LAE. Mild ROSITA. Mod TR. Mild IVCE.  Cardiac nuclear imaging test, high risk 2016    High risk. Mainly fixed anteroapical defect w/partial reversibility. EF 60%. No RWMA. Nondiagnostic EKG on pharm stress test.    Cardiovascular upper extremity arterial duplex 2014    No significant occlusive arterial disease bilaterally.  Coronary atherosclerosis of native coronary artery     Diabetes mellitus ([facility] 14    hgbA1C 6.8    Diabetic eye exam ([facility] [facility]     Dyslipidemia     GERD (gastroesophageal reflux disease)     H/O screening mammography 2016    no evidence of malignancy    Hypercholesterolemia     Hypomagnesemia 14    1.4    Pleural effusion, left     s/p CABG and thoracentesis with removal of 900 mL    Pulmonary arterial hypertension ([facility] [address]        Past Surgical History:   Procedure Laterality Date    CARDIAC CATHETERIZATION  2016         CORONARY ARTERY ANGIOGRAM  2016         HX  SECTION      HX CORONARY ARTERY BYPASS GRAFT  12/31/2011    x1    LV ANGIOGRAPHY  2016         VT CARDIAC SURG PROCEDURE UNLIST         Current Outpatient Medications   Medication Sig Dispense Refill    carBAMazepine (TEGretol) 200 mg tablet TAKE 1 TABLET BY MOUTH TWICE A  Tablet 0    metoprolol tartrate (LOPRESSOR) 25 mg tablet TAKE 1/2 TABLET BY MOUTH TWICE A DAY 90 Tablet 2    aspirin delayed-release 81 mg tablet Take 1 Tablet by mouth daily.  90 Tablet 1    magnesium oxide (MAG-OX) 400 mg tablet Take 1 tablet by mouth once daily 90 Tab 0    diazePAM (Valium) 5 mg tablet Take one tablet by mouth 30 minutes before procedure 2 Tab 0    gabapentin (NEURONTIN) 300 mg capsule 2 caps QAM, 1 cap at noon, 1 cap at dinner, and 2 caps at HS 1 Cap 0    lidocaine (LIDODERM) 5 % APPLY 1 NEW PATCH TO SKIN FOR LEFT DORSAL PAIN UP TO 3 PATCHES EACH TIME Q 12 HOURS PRN.  nitroglycerin (NITROSTAT) 0.4 mg SL tablet DISSOLVE ONE TABLET UNDER THE TONGUE AT THE START OF CHEST PAIN EVERY 5 MINUTES UP TO 3 DOSES      DULoxetine (CYMBALTA) 60 mg capsule Take 1 Cap by mouth daily. 90 Cap 1    multivitamin (ONE A DAY) tablet Take 1 Tab by mouth daily. 30 Tab 11    omega-3 acid ethyl esters (LOVAZA) 1 gram capsule Take 1 Cap by mouth nightly. 90 Cap 3    magnesium oxide (MAG-OX) 400 mg tablet Take 1 Tablet by mouth daily. (Patient not taking: Reported on 7/27/2021) 90 Tablet 1       The patient has a family history of    Social History     Tobacco Use    Smoking status: Never Smoker    Smokeless tobacco: Never Used    Tobacco comment: 2nd home smoking from mother during 1st 17 yrs of life   Substance Use Topics    Alcohol use: No     Alcohol/week: 1.0 standard drinks     Types: 1 Standard drinks or equivalent per week    Drug use: No       Lab Results   Component Value Date/Time    Cholesterol, total 309 (H) 02/18/2021 09:14 AM    HDL Cholesterol 77 (H) 02/18/2021 09:14 AM    LDL, calculated 212.4 (H) 02/18/2021 09:14 AM    Triglyceride 98 02/18/2021 09:14 AM    CHOL/HDL Ratio 4.0 02/18/2021 09:14 AM        BP Readings from Last 3 Encounters:   07/27/21 132/80   05/12/21 125/81   02/15/21 117/75        Pulse Readings from Last 3 Encounters:   07/27/21 (!) 57   05/12/21 69   02/15/21 62       Wt Readings from Last 3 Encounters:   07/27/21 121.1 kg (267 lb)   05/12/21 122 kg (269 lb)   02/15/21 122.5 kg (270 lb)         EKG: unchanged from previous tracings, sinus bradycardia, nonspecific ST and T waves changes, anterolateral T wave inversion.

## 2021-07-27 NOTE — PROGRESS NOTES
Donna Arita presents today for   Chief Complaint   Patient presents with    Follow-up     6 month follow up- no complaints        Donna Arita preferred language for health care discussion is english/other. Is someone accompanying this pt? no    Is the patient using any DME equipment during [address]? no    Depression Screening:  3 most recent PHQ Screens 7/27/2021   Little interest or pleasure in doing things Not at all   Feeling down, depressed, irritable, or hopeless Not at all   Total Score PHQ 2 0   Trouble falling or staying asleep, or sleeping too much -   Feeling tired or having little energy -   Poor appetite, weight loss, or overeating -   Feeling bad about yourself - or that you are a failure or have let yourself or your family down -   Trouble concentrating on things such as school, work, reading, or watching TV -   Moving or speaking so slowly that other people could have noticed; or the opposite being so fidgety that others notice -   Thoughts of being better off dead, or hurting yourself in some way -   PHQ 9 Score -   How difficult have these problems made it for you to do your work, take care of your home and get along with others -       Learning Assessment:  Learning Assessment 3/17/2017   PRIMARY LEARNER Patient   HIGHEST LEVEL OF EDUCATION - PRIMARY LEARNER  -   BARRIERS PRIMARY LEARNER -   CO-LEARNER CAREGIVER -   PRIMARY LANGUAGE ENGLISH   LEARNER PREFERENCE PRIMARY DEMONSTRATION   ANSWERED BY patient   RELATIONSHIP SELF       Abuse Screening:  Abuse Screening Questionnaire 7/27/2021   Do you ever feel afraid of your partner? N   Are you in a relationship with someone who physically or mentally threatens you? N   Is it safe for you to go home? Y       Fall Risk  No flowsheet data found. Pt currently taking Anticoagulant therapy? no    Coordination of Care:  1. Have you been to the ER, urgent care clinic since your last visit? Hospitalized since your last visit? no    2.  Have you seen or consulted any other health care providers outside of the [address] [place] [address] since your last visit? Include any pap smears or colon screening.  no

## 2021-09-27 DIAGNOSIS — G50.0 TRIGEMINAL NEURALGIA OF LEFT SIDE OF FACE: ICD-10-CM

## 2021-09-27 RX ORDER — CARBAMAZEPINE 200 MG/1
TABLET ORAL
Qty: 180 TABLET | Refills: 0 | Status: SHIPPED | OUTPATIENT
Start: 2021-09-27 | End: 2021-12-22

## 2021-10-04 RX ORDER — OLMESARTAN MEDOXOMIL AND HYDROCHLOROTHIAZIDE 20/12.5 20; 12.5 MG/1; MG/1
1 TABLET ORAL DAILY
Qty: 90 TABLET | Refills: 1 | Status: SHIPPED | OUTPATIENT
Start: 2021-10-04 | End: 2021-10-07 | Stop reason: SDUPTHER

## 2021-10-04 NOTE — TELEPHONE ENCOUNTER
Last Visit: 5/12/21 with MD Junior Mason  Next Appointment: 10/28/21 with MD Junior Mason    Requested Prescriptions     Pending Prescriptions Disp Refills    olmesartan-hydroCHLOROthiazide (BENICAR HCT) 20-12.5 mg per tablet 90 Tablet 1     Sig: Take 1 Tablet by mouth daily.

## 2021-10-07 NOTE — TELEPHONE ENCOUNTER
Previous Rx sent to [facility]. Patient would like this sent to [facility].    Requested Prescriptions     Pending Prescriptions Disp Refills    olmesartan-hydroCHLOROthiazide (BENICAR HCT) 20-12.5 mg per tablet 90 Tablet 1     Sig: Take 1 Tablet by mouth daily.

## 2021-10-08 RX ORDER — ASPIRIN 81 MG/1
TABLET ORAL
Qty: 90 TABLET | Refills: 1 | Status: SHIPPED | OUTPATIENT
Start: 2021-10-08 | End: 2022-03-14

## 2021-10-11 NOTE — TELEPHONE ENCOUNTER
Pt called to check status of refill request for olmesartan, adv pending provider approval, please adv [phone]

## 2021-10-13 RX ORDER — OLMESARTAN MEDOXOMIL AND HYDROCHLOROTHIAZIDE 20/12.5 20; 12.5 MG/1; MG/1
1 TABLET ORAL DAILY
Qty: 90 TABLET | Refills: 1 | Status: SHIPPED | OUTPATIENT
Start: 2021-10-13 | End: 2021-10-19 | Stop reason: SDUPTHER

## 2021-10-13 NOTE — TELEPHONE ENCOUNTER
Pt states [facility] has not received prescription refill ; she is now experiencing swelling in fingers and concerned. Requesting to be notified when refill has been submitted.

## 2021-10-19 RX ORDER — OLMESARTAN MEDOXOMIL AND HYDROCHLOROTHIAZIDE 20/12.5 20; 12.5 MG/1; MG/1
1 TABLET ORAL DAILY
Qty: 90 TABLET | Refills: 3 | Status: SHIPPED | OUTPATIENT
Start: 2021-10-19 | End: 2021-12-22

## 2021-10-28 ENCOUNTER — OFFICE VISIT (OUTPATIENT)
Dept: FAMILY MEDICINE CLINIC | Age: 51
End: 2021-10-28
Payer: COMMERCIAL

## 2021-10-28 VITALS
WEIGHT: 267.2 LBS | HEART RATE: 56 BPM | TEMPERATURE: 97.1 F | BODY MASS INDEX: 47.34 KG/M2 | RESPIRATION RATE: 12 BRPM | HEIGHT: 63 IN | OXYGEN SATURATION: 94 % | SYSTOLIC BLOOD PRESSURE: 127 MMHG | DIASTOLIC BLOOD PRESSURE: 81 MMHG

## 2021-10-28 DIAGNOSIS — Z23 NEEDS FLU SHOT: ICD-10-CM

## 2021-10-28 DIAGNOSIS — M79.622 PAIN IN LEFT AXILLA: ICD-10-CM

## 2021-10-28 DIAGNOSIS — R10.30 LOWER ABDOMINAL PAIN: Primary | ICD-10-CM

## 2021-10-28 DIAGNOSIS — E11.65 TYPE 2 DIABETES MELLITUS WITH HYPERGLYCEMIA, WITHOUT LONG-TERM CURRENT USE OF INSULIN (HCC): ICD-10-CM

## 2021-10-28 PROCEDURE — 90686 IIV4 VACC NO PRSV 0.5 ML IM: CPT | Performed by: FAMILY MEDICINE

## 2021-10-28 PROCEDURE — 99214 OFFICE O/P EST MOD 30 MIN: CPT | Performed by: FAMILY MEDICINE

## 2021-10-28 NOTE — PATIENT INSTRUCTIONS
Abdominal Pain: Care Instructions  Your Care Instructions     Abdominal pain has many possible causes. Some aren't serious and get better on their own in a few days. Others need more testing and treatment. If your pain continues or gets worse, you need to be rechecked and may need more tests to find out what is wrong. You may need surgery to correct the problem. Don't ignore new symptoms, such as fever, nausea and vomiting, urination problems, pain that gets worse, and dizziness. These may be signs of a more serious problem. Your doctor may have recommended a follow-up visit in the next 8 to 12 hours. If you are not getting better, you may need more tests or treatment. The doctor has checked you carefully, but problems can develop later. If you notice any problems or new symptoms, get medical treatment right away. Follow-up care is a key part of your treatment and safety. Be sure to make and go to all appointments, and call your doctor if you are having problems. It's also a good idea to know your test results and keep a list of the medicines you take. How can you care for yourself at home? · Rest until you feel better. · To prevent dehydration, drink plenty of fluids. Choose water and other clear liquids until you feel better. If you have kidney, heart, or liver disease and have to limit fluids, talk with your doctor before you increase the amount of fluids you drink. · If your stomach is upset, eat mild foods, such as rice, dry toast or crackers, bananas, and applesauce. Try eating several small meals instead of two or three large ones. · Wait until 48 hours after all symptoms have gone away before you have spicy foods, alcohol, and drinks that contain caffeine. · Do not eat foods that are high in fat. · Avoid anti-inflammatory medicines such as aspirin, ibuprofen (Advil, Motrin), and naproxen (Aleve). These can cause stomach upset.  Talk to your doctor if you take daily aspirin for another health problem. When should you call for help? Call 911 anytime you think you may need emergency care. For example, call if:    · You passed out (lost consciousness).     · You pass maroon or very bloody stools.     · You vomit blood or what looks like coffee grounds.     · You have new, severe belly pain. Call your doctor now or seek immediate medical care if:    · Your pain gets worse, especially if it becomes focused in one area of your belly.     · You have a new or higher fever.     · Your stools are black and look like tar, or they have streaks of blood.     · You have unexpected vaginal bleeding.     · You have symptoms of a urinary tract infection. These may include:  ? Pain when you urinate. ? Urinating more often than usual.  ? Blood in your urine.     · You are dizzy or lightheaded, or you feel like you may faint. Watch closely for changes in your health, and be sure to contact your doctor if:    · You are not getting better after 1 day (24 hours). Where can you learn more? Go to http://www.gray.com/  Enter W695 in the search box to learn more about \"Abdominal Pain: Care Instructions. \"  Current as of: July 1, 2021               Content Version: 13.0  © 2006-2021 [employer]. Care instructions adapted under license by [employer] (which disclaims liability or warranty for this information). If you have questions about a medical condition or this instruction, always ask your healthcare professional. [place] [address] any warranty or liability for your use of this information.

## 2021-10-28 NOTE — PROGRESS NOTES
HPI:  Cindy Barron is a 45 y.o. female who presents today with   Chief Complaint   Patient presents with    Abdominal Pain     soreness after bowel movement     Arm Pain     left underarm         States that she has soreness under her abdomen after she has a BM. Pain is dull; Lasts for about 1.5 hours afterward. She has had cologuard. , no vomiting; No diarrhea; no fever. Her left underarm and anterior left axilla is sore. Hurts to move her arm. Sx come and go. She was treated with an abx at once for this. Nothing helps her sx; Worse with moving her arm. Has CAD but sees cardiology and they are aware of her sx. She has been told that her symptoms are not reflective of a cardiac source. Feels depressed because of her foot. She states she is unable to do the things she wants to do. She has no SI. Patient also has diabetes. She needs some health maintenance follow-up for her diabetes. Labs have been ordered. Foot exam has been done.     3 most recent PHQ Screens 10/28/2021   PHQ Not Done Patient refuses   Little interest or pleasure in doing things More than half the days   Feeling down, depressed, irritable, or hopeless Several days   Total Score PHQ 2 3   Trouble falling or staying asleep, or sleeping too much More than half the days   Feeling tired or having little energy Nearly every day   Poor appetite, weight loss, or overeating Not at all   Feeling bad about yourself - or that you are a failure or have let yourself or your family down Not at all   Trouble concentrating on things such as school, work, reading, or watching TV More than half the days   Moving or speaking so slowly that other people could have noticed; or the opposite being so fidgety that others notice Not at all   Thoughts of being better off dead, or hurting yourself in some way Not at all   PHQ 9 Score 10   How difficult have these problems made it for you to do your work, take care of your home and get along with others Somewhat difficult               PMH,  Meds, Allergies, Family History, Social history reviewed      Current Outpatient Medications   Medication Sig Dispense Refill    olmesartan-hydroCHLOROthiazide (BENICAR HCT) 20-12.5 mg per tablet Take 1 Tablet by mouth daily. 90 Tablet 3    aspirin delayed-release 81 mg tablet TAKE 1 TABLET BY MOUTH EVERY DAY 90 Tablet 1    carBAMazepine (TEGretol) 200 mg tablet TAKE 1 TABLET BY MOUTH TWICE A  Tablet 0    metoprolol tartrate (LOPRESSOR) 25 mg tablet TAKE 1/2 TABLET BY MOUTH TWICE A DAY 90 Tablet 2    magnesium oxide (MAG-OX) 400 mg tablet Take 1 tablet by mouth once daily 90 Tab 0    gabapentin (NEURONTIN) 300 mg capsule 2 caps QAM, 1 cap at noon, 1 cap at dinner, and 2 caps at HS 1 Cap 0    lidocaine (LIDODERM) 5 % APPLY 1 NEW PATCH TO SKIN FOR LEFT DORSAL PAIN UP TO 3 PATCHES EACH TIME Q 12 HOURS PRN.  nitroglycerin (NITROSTAT) 0.4 mg SL tablet DISSOLVE ONE TABLET UNDER THE TONGUE AT THE START OF CHEST PAIN EVERY 5 MINUTES UP TO 3 DOSES      DULoxetine (CYMBALTA) 60 mg capsule Take 1 Cap by mouth daily. 90 Cap 1    multivitamin (ONE A DAY) tablet Take 1 Tab by mouth daily. 30 Tab 11    omega-3 acid ethyl esters (LOVAZA) 1 gram capsule Take 1 Cap by mouth nightly.  90 Cap 3    diazePAM (Valium) 5 mg tablet Take one tablet by mouth 30 minutes before procedure (Patient not taking: Reported on 10/28/2021) 2 Tab 0        Allergies   Allergen Reactions    Contrave [Naltrexone-Bupropion] Other (comments)     Severe constipation    Metformin Nausea Only                  ROS as per HPI      Visit Vitals  /81 (BP 1 Location: Right arm, BP Patient Position: Sitting, BP Cuff Size: Large adult)   Pulse (!) 56   Temp 97.1 °F (36.2 °C) (Temporal)   Resp 12   Ht 5' 3\" (1.6 m)   Wt 267 lb 3.2 oz (121.2 kg)   SpO2 94%   BMI 47.33 kg/m²     Physical Exam    General appearance: alert, cooperative, no distress, appears stated age  Lungs: clear to auscultation bilaterally  Heart: regular rate and rhythm, S1, S2 normal, no murmur, click, rub or gallop  Abdomen: soft, non-tender. Bowel sounds normal. No masses,  no organomegaly  Extremities: extremities normal, atraumatic, no cyanosis or edema  Sensory exam of the foot is normal, tested with the monofilament. Good pulses, no lesions or ulcers, good peripheral pulses. Left axilla: There are no palpable masses appreciated. There is no significant appreciable tenderness to palpation. There is no evidence of abscess. Sensory exam of the foot is normal, tested with the monofilament. Good pulses, no lesions or ulcers, good peripheral pulses. Lab Results   Component Value Date/Time    Cholesterol, total 309 (H) 02/18/2021 09:14 AM    HDL Cholesterol 77 (H) 02/18/2021 09:14 AM    LDL, calculated 212.4 (H) 02/18/2021 09:14 AM    VLDL, calculated 19.6 02/18/2021 09:14 AM    Triglyceride 98 02/18/2021 09:14 AM    CHOL/HDL Ratio 4.0 02/18/2021 09:14 AM     Lab Results   Component Value Date/Time    Sodium 139 02/18/2021 09:14 AM    Potassium 4.1 02/18/2021 09:14 AM    Chloride 105 02/18/2021 09:14 AM    CO2 28 02/18/2021 09:14 AM    Anion gap 6 02/18/2021 09:14 AM    Glucose 101 (H) 02/18/2021 09:14 AM    BUN 14 02/18/2021 09:14 AM    Creatinine 0.78 02/18/2021 09:14 AM    BUN/Creatinine ratio 18 02/18/2021 09:14 AM    GFR est AA >60 02/18/2021 09:14 AM    GFR est non-AA >60 02/18/2021 09:14 AM    Calcium 8.8 02/18/2021 09:14 AM    Bilirubin, total 0.3 02/18/2021 09:14 AM    Alk. phosphatase 249 (H) 02/18/2021 09:14 AM    Protein, total 7.6 02/18/2021 09:14 AM    Albumin 3.8 02/18/2021 09:14 AM    Globulin 3.8 02/18/2021 09:14 AM    A-G Ratio 1.0 02/18/2021 09:14 AM    ALT (SGPT) 66 (H) 02/18/2021 09:14 AM    AST (SGOT) 41 (H) 02/18/2021 09:14 AM        Diagnoses and all orders for this visit:    1. Lower abdominal pain  -     CT ABD W WO CONT; Future    2. Pain in left axilla    3.  Type 2 diabetes mellitus with hyperglycemia, without long-term current use of insulin ([facility])  -      DIABETES FOOT EXAM  -     LIPID PANEL; Future  -     METABOLIC PANEL, COMPREHENSIVE; Future    4. Needs flu shot  -     INFLUENZA VIRUS VAC QUAD,SPLIT,PRESV FREE SYRINGE IM        As above  CT scan of the abdomen  Monitor pain of the axilla. Patient advised that symptoms sound more musculoskeletal.  If it worsens notify MD.  Symptoms do not appear to be cardiac in origin. Follow-up and Dispositions    · Return in about 4 months (around 2/28/2022) for diabetes, htn.             Kali Berry MD

## 2021-10-28 NOTE — PROGRESS NOTES
Chief Complaint   Patient presents with    Abdominal Pain     soreness after bowel movement     Arm Pain     left underarm        Pt preferred language for health care discussion is english.     Is someone accompanying this pt? no    Is the patient using any DME equipment during OV? no    Depression Screening:  3 most recent [facility] 36 Screens 10/28/2021 7/27/2021 5/12/2021 2/15/2021 7/14/2020 3/2/2020 3/2/2020   PHQ Not Done Patient refuses - - - - - -   Little interest or pleasure in doing things More than half the days Not at all Several days Nearly every day Not at all Not at all Not at all   Feeling down, depressed, irritable, or hopeless Several days Not at all Several days Nearly every day Not at all More than half the days Not at all   Total Score PHQ 2 3 0 2 6 0 2 0   Trouble falling or staying asleep, or sleeping too much More than half the days - - Several days - - -   Feeling tired or having little energy Nearly every day - - Nearly every day - - -   Poor appetite, weight loss, or overeating Not at all - - Not at all - - -   Feeling bad about yourself - or that you are a failure or have let yourself or your family down Not at all - - Nearly every day - - -   Trouble concentrating on things such as school, work, reading, or watching TV More than half the days - - Not at all - - -   Moving or speaking so slowly that other people could have noticed; or the opposite being so fidgety that others notice Not at all - - Not at all - - -   Thoughts of being better off dead, or hurting yourself in some way Not at all - - Not at all - - -   PHQ 9 Score 10 - - 13 - - -   How difficult have these problems made it for you to do your work, take care of your home and get along with others Somewhat difficult - - Somewhat difficult - - -       Learning Assessment:  Learning Assessment 3/17/2017 2/7/2017 3/31/2015 12/8/2014   PRIMARY LEARNER Patient Patient Patient Patient   HIGHEST LEVEL OF EDUCATION - PRIMARY LEARNER  - - > 4 YEARS OF COLLEGE -   BARRIERS PRIMARY LEARNER - - NONE -   CO-LEARNER CAREGIVER - - No No   PRIMARY LANGUAGE ENGLISH ENGLISH ENGLISH ENGLISH   LEARNER PREFERENCE PRIMARY DEMONSTRATION LISTENING READING LISTENING   ANSWERED BY patient patient patient patient   RELATIONSHIP SELF SELF SELF SELF         Health Maintenance reviewed and discussed per provider. Yes        Advance Directive:  1. Do you have an advance directive in place? Patient Reply:no    2. If not, would you like material regarding how to put one in place? Patient Reply: no    Coordination of Care:  1. Have you been to the ER, urgent care clinic since your last visit? Hospitalized since your last visit? no    2. Have you seen or consulted any other health care providers outside of the [address] [place] [address] since your last visit? Include any pap smears, colon screening or mammograms?  no      Patient received influenza vaccine 0.5ml in left deltoid. Tolerated well. No signs or symptoms of distress noted. Most current VIS given and consent signed.

## 2021-11-01 ENCOUNTER — HOSPITAL ENCOUNTER (OUTPATIENT)
Dept: LAB | Age: 51
Discharge: HOME | End: 2021-11-01
Payer: COMMERCIAL

## 2021-11-01 ENCOUNTER — APPOINTMENT (OUTPATIENT)
Dept: FAMILY MEDICINE CLINIC | Age: 51
End: 2021-11-01

## 2021-11-01 DIAGNOSIS — E11.65 TYPE 2 DIABETES MELLITUS WITH HYPERGLYCEMIA, WITHOUT LONG-TERM CURRENT USE OF INSULIN (HCC): ICD-10-CM

## 2021-11-01 LAB
ALBUMIN SERPL-MCNC: 3.8 G/DL (ref 3.4–5)
ALBUMIN/GLOB SERPL: 1 {RATIO} (ref 0.8–1.7)
ALP SERPL-CCNC: 236 U/L (ref 45–117)
ALT SERPL-CCNC: 56 U/L (ref 13–56)
ANION GAP SERPL CALC-SCNC: 3 MMOL/L (ref 3–18)
AST SERPL-CCNC: 39 U/L (ref 10–38)
BILIRUB SERPL-MCNC: 0.3 MG/DL (ref 0.2–1)
BUN SERPL-MCNC: 16 MG/DL (ref 7–18)
BUN/CREAT SERPL: 18 (ref 12–20)
CALCIUM SERPL-MCNC: 9.1 MG/DL (ref 8.5–10.1)
CHLORIDE SERPL-SCNC: 103 MMOL/L (ref 100–111)
CHOLEST SERPL-MCNC: 276 MG/DL
CO2 SERPL-SCNC: 31 MMOL/L (ref 21–32)
CREAT SERPL-MCNC: 0.87 MG/DL (ref 0.6–1.3)
GLOBULIN SER CALC-MCNC: 3.8 G/DL (ref 2–4)
GLUCOSE SERPL-MCNC: 98 MG/DL (ref 74–99)
HDLC SERPL-MCNC: 70 MG/DL (ref 40–60)
HDLC SERPL: 3.9 {RATIO} (ref 0–5)
LDLC SERPL CALC-MCNC: 178.8 MG/DL (ref 0–100)
LIPID PROFILE,FLP: ABNORMAL
POTASSIUM SERPL-SCNC: 4 MMOL/L (ref 3.5–5.5)
PROT SERPL-MCNC: 7.6 G/DL (ref 6.4–8.2)
SODIUM SERPL-SCNC: 137 MMOL/L (ref 136–145)
TRIGL SERPL-MCNC: 136 MG/DL (ref ?–150)
VLDLC SERPL CALC-MCNC: 27.2 MG/DL

## 2021-11-01 PROCEDURE — 80053 COMPREHEN METABOLIC PANEL: CPT

## 2021-11-01 PROCEDURE — 80061 LIPID PANEL: CPT

## 2021-11-01 PROCEDURE — 36415 COLL VENOUS BLD VENIPUNCTURE: CPT

## 2021-11-10 ENCOUNTER — TELEPHONE (OUTPATIENT)
Dept: FAMILY MEDICINE CLINIC | Age: 51
End: 2021-11-10

## 2021-11-11 ENCOUNTER — HOSPITAL ENCOUNTER (OUTPATIENT)
Dept: CT IMAGING | Age: 51
Discharge: HOME | End: 2021-11-11
Attending: FAMILY MEDICINE
Payer: COMMERCIAL

## 2021-11-11 DIAGNOSIS — R10.30 LOWER ABDOMINAL PAIN: ICD-10-CM

## 2021-11-11 PROCEDURE — 74177 CT ABD & PELVIS W/CONTRAST: CPT

## 2021-11-11 PROCEDURE — 74011000636 HC RX REV CODE- 636: Performed by: FAMILY MEDICINE

## 2021-11-11 RX ADMIN — IOPAMIDOL 100 ML: 612 INJECTION, SOLUTION INTRAVENOUS at 13:02

## 2021-12-05 DIAGNOSIS — G47.62 NOCTURNAL LEG CRAMPS: ICD-10-CM

## 2021-12-05 DIAGNOSIS — E83.42 HYPOMAGNESEMIA: ICD-10-CM

## 2021-12-06 NOTE — TELEPHONE ENCOUNTER
Last Visit: 10/28/21 with MD Prashant Crowley  Next Appointment: 3/2/22 with MD Prashant Crowley  Previous Refill Encounter(s): 2/26/21 #90    Requested Prescriptions     Pending Prescriptions Disp Refills    magnesium oxide (MAG-OX) 400 mg tablet 90 Tablet 1     Sig: Take 1 Tablet by mouth daily.

## 2021-12-09 RX ORDER — LANOLIN ALCOHOL/MO/W.PET/CERES
400 CREAM (GRAM) TOPICAL DAILY
Qty: 90 TABLET | Refills: 1 | Status: SHIPPED | OUTPATIENT
Start: 2021-12-09 | End: 2022-04-09 | Stop reason: SDUPTHER

## 2021-12-22 ENCOUNTER — HOSPITAL ENCOUNTER (EMERGENCY)
Age: 51
Discharge: HOME | End: 2021-12-22
Attending: EMERGENCY MEDICINE
Payer: COMMERCIAL

## 2021-12-22 ENCOUNTER — APPOINTMENT (OUTPATIENT)
Dept: GENERAL RADIOLOGY | Age: 51
End: 2021-12-22
Attending: EMERGENCY MEDICINE
Payer: COMMERCIAL

## 2021-12-22 VITALS
DIASTOLIC BLOOD PRESSURE: 75 MMHG | WEIGHT: 270 LBS | HEART RATE: 55 BPM | BODY MASS INDEX: 47.84 KG/M2 | OXYGEN SATURATION: 98 % | RESPIRATION RATE: 17 BRPM | HEIGHT: 63 IN | SYSTOLIC BLOOD PRESSURE: 114 MMHG | TEMPERATURE: 98.4 F

## 2021-12-22 DIAGNOSIS — R07.9 CHEST PAIN, UNSPECIFIED TYPE: Primary | ICD-10-CM

## 2021-12-22 LAB
ANION GAP SERPL CALC-SCNC: 4 MMOL/L (ref 3–18)
BASOPHILS # BLD: 0 K/UL (ref 0–0.1)
BASOPHILS NFR BLD: 0 % (ref 0–2)
BUN SERPL-MCNC: 17 MG/DL (ref 7–18)
BUN/CREAT SERPL: 18 (ref 12–20)
CALCIUM SERPL-MCNC: 9.1 MG/DL (ref 8.5–10.1)
CHLORIDE SERPL-SCNC: 105 MMOL/L (ref 100–111)
CO2 SERPL-SCNC: 31 MMOL/L (ref 21–32)
CREAT SERPL-MCNC: 0.94 MG/DL (ref 0.6–1.3)
DIFFERENTIAL METHOD BLD: NORMAL
EOSINOPHIL # BLD: 0.2 K/UL (ref 0–0.4)
EOSINOPHIL NFR BLD: 2 % (ref 0–5)
ERYTHROCYTE [DISTWIDTH] IN BLOOD BY AUTOMATED COUNT: 14.4 % (ref 11.6–14.5)
GLUCOSE SERPL-MCNC: 96 MG/DL (ref 74–99)
HCT VFR BLD AUTO: 38.9 % (ref 35–45)
HGB BLD-MCNC: 12.8 G/DL (ref 12–16)
IMM GRANULOCYTES # BLD AUTO: 0 K/UL (ref 0–0.04)
IMM GRANULOCYTES NFR BLD AUTO: 0 % (ref 0–0.5)
LYMPHOCYTES # BLD: 2.3 K/UL (ref 0.9–3.6)
LYMPHOCYTES NFR BLD: 32 % (ref 21–52)
MCH RBC QN AUTO: 29 PG (ref 24–34)
MCHC RBC AUTO-ENTMCNC: 32.9 G/DL (ref 31–37)
MCV RBC AUTO: 88 FL (ref 78–100)
MONOCYTES # BLD: 0.4 K/UL (ref 0.05–1.2)
MONOCYTES NFR BLD: 6 % (ref 3–10)
NEUTS SEG # BLD: 4.4 K/UL (ref 1.8–8)
NEUTS SEG NFR BLD: 60 % (ref 40–73)
NRBC # BLD: 0 K/UL (ref 0–0.01)
NRBC BLD-RTO: 0 PER 100 WBC
PLATELET # BLD AUTO: 172 K/UL (ref 135–420)
PMV BLD AUTO: 11.8 FL (ref 9.2–11.8)
POTASSIUM SERPL-SCNC: 4.3 MMOL/L (ref 3.5–5.5)
RBC # BLD AUTO: 4.42 M/UL (ref 4.2–5.3)
SODIUM SERPL-SCNC: 140 MMOL/L (ref 136–145)
TROPONIN-HIGH SENSITIVITY: 6 NG/L (ref 0–54)
TROPONIN-HIGH SENSITIVITY: 6 NG/L (ref 0–54)
WBC # BLD AUTO: 7.3 K/UL (ref 4.6–13.2)

## 2021-12-22 PROCEDURE — 93005 ELECTROCARDIOGRAM TRACING: CPT

## 2021-12-22 PROCEDURE — 71045 X-RAY EXAM CHEST 1 VIEW: CPT

## 2021-12-22 PROCEDURE — 84484 ASSAY OF TROPONIN QUANT: CPT

## 2021-12-22 PROCEDURE — 80048 BASIC METABOLIC PNL TOTAL CA: CPT

## 2021-12-22 PROCEDURE — 85025 COMPLETE CBC W/AUTO DIFF WBC: CPT

## 2021-12-22 PROCEDURE — 99284 EMERGENCY DEPT VISIT MOD MDM: CPT

## 2021-12-22 NOTE — ED PROVIDER NOTES
EMERGENCY DEPARTMENT HISTORY AND PHYSICAL EXAM    2:41 PM patient seen at this time in exam room in      Date: 12/22/2021  Patient Name: Elicia Gonzalez    History of Presenting Illness     Chief Complaint   Patient presents with    Chest Pain         History Provided By: patient    Additional History (Context): Elicia Gonzalez is a 45 y.o. female presents with history of diabetes hypertension coronary artery disease one-way bypass, has short-lived episodic pain that started yesterday very localized over the center of her chest lasting less than a minute not associated with exertion breathing eating or position changes again spontaneously resolved within a minute. Today well seated at her desk at work had several more episodes of the same pain very short-lived never more than a minute few seconds. No exertional pleuritic or positional component no change with movement of her arms or her chest.  Pain-free at the time of my exam  No constitutional symptoms sweats fever nausea vomiting abdominal pain. PCP: Josephine Lino MD    Chief Complaint:   Duration:    Timing:    Location:   Quality:   Severity:   Modifying Factors:   Associated Symptoms:       Current Outpatient Medications   Medication Sig Dispense Refill    magnesium oxide (MAG-OX) 400 mg tablet Take 1 Tablet by mouth daily. 90 Tablet 1    aspirin delayed-release 81 mg tablet TAKE 1 TABLET BY MOUTH EVERY DAY 90 Tablet 1    metoprolol tartrate (LOPRESSOR) 25 mg tablet TAKE 1/2 TABLET BY MOUTH TWICE A DAY 90 Tablet 2    gabapentin (NEURONTIN) 300 mg capsule 2 caps QAM, 1 cap at noon, 1 cap at dinner, and 2 caps at HS 1 Cap 0    lidocaine (LIDODERM) 5 % APPLY 1 NEW PATCH TO SKIN FOR LEFT DORSAL PAIN UP TO 3 PATCHES EACH TIME Q 12 HOURS PRN.       nitroglycerin (NITROSTAT) 0.4 mg SL tablet DISSOLVE ONE TABLET UNDER THE TONGUE AT THE START OF CHEST PAIN EVERY 5 MINUTES UP TO 3 DOSES      DULoxetine (CYMBALTA) 60 mg capsule Take 1 Cap by mouth daily. 90 Cap 1    multivitamin (ONE A DAY) tablet Take 1 Tab by mouth daily. 30 Tab 11    omega-3 acid ethyl esters (LOVAZA) 1 gram capsule Take 1 Cap by mouth nightly. 90 Cap 3       Past History     Past Medical History:  Past Medical History:   Diagnosis Date    Abnormal PFT 10/1/2015    Dr. Isabel Schroeder, Pulmonology    Acid reflux     CABG x 1 2011    LIMA-LAD    CAD (coronary artery disease)     Cardiac cath 2016    R dom. oLAD 85%. Otherwise patent coronaries. HECTOR to LAD patent. LVEDP 22 mmHg. EF 55%. No RWMA.  Cardiac echocardiogram 2015    Tech difficult. EF 50-55%. No WMA. Mild conc LVH. Gr 2 DDfx. RVSP 45-50 mmHg. Mild LAE. Mild ROSITA. Mod TR. Mild IVCE.  Cardiac nuclear imaging test, high risk 2016    High risk. Mainly fixed anteroapical defect w/partial reversibility. EF 60%. No RWMA. Nondiagnostic EKG on pharm stress test.    Cardiovascular upper extremity arterial duplex 2014    No significant occlusive arterial disease bilaterally.     Coronary atherosclerosis of native coronary artery     Diabetes mellitus ([address] 14    hgbA1C 6.8    Diabetic eye exam ([address]     Dyslipidemia     GERD (gastroesophageal reflux disease)     H/O screening mammography 2016    no evidence of malignancy    Hypercholesterolemia     Hypomagnesemia 14    1.4    Pleural effusion, left     s/p CABG and thoracentesis with removal of 900 mL    Pulmonary arterial hypertension ([address]        Past Surgical History:  Past Surgical History:   Procedure Laterality Date    CARDIAC CATHETERIZATION  2016         CORONARY ARTERY ANGIOGRAM  2016         HX  SECTION      HX CORONARY ARTERY BYPASS GRAFT  12/31/2011    x1    LV ANGIOGRAPHY  2016         CO CARDIAC SURG PROCEDURE UNLIST         Family History:  Family History   Problem Relation Age of Onset    Diabetes Mother     Hypertension Father     Alzheimer's Disease Father 61       Social History:  Social History     Tobacco Use    Smoking status: Never Smoker    Smokeless tobacco: Never Used    Tobacco comment: 2nd home smoking from mother during 1st 17 yrs of life   Vaping Use    Vaping Use: Never used   Substance Use Topics    Alcohol use: No     Alcohol/week: 1.0 standard drink     Types: 1 Standard drinks or equivalent per week    Drug use: No       Allergies: Allergies   Allergen Reactions    Contrave [Naltrexone-Bupropion] Other (comments)     Severe constipation    Metformin Nausea Only         Review of Systems     Review of Systems   Constitutional: Negative for diaphoresis and fever. HENT: Negative for congestion and sore throat. Eyes: Negative for pain and itching. Respiratory: Negative for cough and shortness of breath. Cardiovascular: Positive for chest pain. Negative for palpitations. Gastrointestinal: Negative for abdominal pain and diarrhea. Endocrine: Negative for polydipsia and polyuria. Genitourinary: Negative for dysuria and hematuria. Musculoskeletal: Negative for arthralgias and myalgias. Skin: Negative for rash and wound. Neurological: Negative for seizures and syncope. Hematological: Does not bruise/bleed easily. Psychiatric/Behavioral: Negative for agitation and hallucinations. Physical Exam       Patient Vitals for the past 12 hrs:   Temp Pulse Resp BP SpO2   12/22/21 1411 98.4 °F (36.9 °C) (!) 55 17 114/75 98 %       IPVITALS  Patient Vitals for the past 24 hrs:   BP Temp Pulse Resp SpO2 Height Weight   12/22/21 1411 114/75 98.4 °F (36.9 °C) (!) 55 17 98 % 5' 3\" (1.6 m) 122.5 kg (270 lb)       Physical Exam  Vitals and nursing note reviewed. Constitutional:       General: She is not in acute distress. Appearance: She is well-developed. She is obese. She is not ill-appearing. HENT:      Head: Normocephalic and atraumatic. Eyes:      General: No scleral icterus.      Conjunctiva/sclera: Conjunctivae normal.   Neck:      Vascular: No JVD. Cardiovascular:      Rate and Rhythm: Normal rate and regular rhythm. Heart sounds: Normal heart sounds. Comments: 4 intact extremity pulses  Pulmonary:      Effort: Pulmonary effort is normal.      Breath sounds: Normal breath sounds. Chest:      Chest wall: No tenderness. Abdominal:      Palpations: Abdomen is soft. There is no mass. Tenderness: There is no abdominal tenderness. Musculoskeletal:         General: Normal range of motion. Cervical back: Normal range of motion and neck supple. Lymphadenopathy:      Cervical: No cervical adenopathy. Skin:     General: Skin is warm and dry. Neurological:      Mental Status: She is alert.            Diagnostic Study Results   Labs -  Recent Results (from the past 12 hour(s))   EKG, 12 LEAD, INITIAL    Collection Time: 12/22/21  2:02 PM   Result Value Ref Range    Ventricular Rate 57 BPM    Atrial Rate 57 BPM    P-R Interval 158 ms    QRS Duration 90 ms    Q-T Interval 414 ms    QTC Calculation (Bezet) 402 ms    Calculated P Axis 24 degrees    Calculated R Axis 62 degrees    Calculated T Axis 134 degrees    Diagnosis       Sinus bradycardia  T wave abnormality, consider anterolateral ischemia  Abnormal ECG  When compared with ECG of 13-FEB-2020 10:12,  T wave inversion no longer evident in Inferior leads  Inverted T waves have replaced nonspecific T wave abnormality in Anterior   leads     CBC WITH AUTOMATED DIFF    Collection Time: 12/22/21  2:35 PM   Result Value Ref Range    WBC 7.3 4.6 - 13.2 K/uL    RBC 4.42 4.20 - 5.30 M/uL    HGB 12.8 12.0 - 16.0 g/dL    HCT 38.9 35.0 - 45.0 %    MCV 88.0 78.0 - 100.0 FL    MCH 29.0 24.0 - 34.0 PG    MCHC 32.9 31.0 - 37.0 g/dL    RDW 14.4 11.6 - 14.5 %    PLATELET 518 733 - 949 K/uL    MPV 11.8 9.2 - 11.8 FL    NRBC 0.0 0  WBC    ABSOLUTE NRBC 0.00 0.00 - 0.01 K/uL    NEUTROPHILS 60 40 - 73 %    LYMPHOCYTES 32 21 - 52 %    MONOCYTES 6 3 - 10 %    EOSINOPHILS 2 0 - 5 %    BASOPHILS 0 0 - 2 %    IMMATURE GRANULOCYTES 0 0.0 - 0.5 %    ABS. NEUTROPHILS 4.4 1.8 - 8.0 K/UL    ABS. LYMPHOCYTES 2.3 0.9 - 3.6 K/UL    ABS. MONOCYTES 0.4 0.05 - 1.2 K/UL    ABS. EOSINOPHILS 0.2 0.0 - 0.4 K/UL    ABS. BASOPHILS 0.0 0.0 - 0.1 K/UL    ABS. IMM. GRANS. 0.0 0.00 - 0.04 K/UL    DF AUTOMATED     METABOLIC PANEL, BASIC    Collection Time: 12/22/21  2:35 PM   Result Value Ref Range    Sodium 140 136 - 145 mmol/L    Potassium 4.3 3.5 - 5.5 mmol/L    Chloride 105 100 - 111 mmol/L    CO2 31 21 - 32 mmol/L    Anion gap 4 3.0 - 18 mmol/L    Glucose 96 74 - 99 mg/dL    BUN 17 7.0 - 18 MG/DL    Creatinine 0.94 0.6 - 1.3 MG/DL    BUN/Creatinine ratio 18 12 - 20      GFR est AA >60 >60 ml/min/1.73m2    GFR est non-AA >60 >60 ml/min/1.73m2    Calcium 9.1 8.5 - 10.1 MG/DL   TROPONIN-HIGH SENSITIVITY    Collection Time: 12/22/21  2:35 PM   Result Value Ref Range    Troponin-High Sensitivity 6 0 - 54 ng/L   TROPONIN-HIGH SENSITIVITY    Collection Time: 12/22/21  5:00 PM   Result Value Ref Range    Troponin-High Sensitivity 6 0 - 54 ng/L       Radiologic Studies -   XR CHEST PORT   Final Result   Enlarged cardiac silhouette without decompensation. XR CHEST PORT    Result Date: 12/22/2021  INDICATION: Chest pain TECHNIQUE: Portable chest radiograph COMPARISON: February 11, 2020 FINDINGS: Enlarged cardiac silhouette. Post CABG change. No focal lung consolidation, pleural effusion or pneumothorax. No significant pulmonary vascular congestion. Enlarged cardiac silhouette without decompensation. Medications ordered:   Medications - No data to display      Medical Decision Making   Initial Medical Decision Making and DDx:  EKG sinus bradycardia 57 T wave abnormalities not changed compared to several priors. Dr. Alban Munson note explicitly cites her abnormal EKG. She had a LIMA to LAD bypass, has been stable since then.   Her story is not very suggestive we will proceed with 2 troponins anticipate discharge. ED Course: Progress Notes, Reevaluation, and Consults:     6:15 PM 2 troponins negative, pattern of pain is not suggestive, no alarming findings on the  EKG pain-free on reexamination happy with the plan for discharge    I am the first provider for this patient. I reviewed the vital signs, available nursing notes, past medical history, past surgical history, family history and social history. Patient Vitals for the past 12 hrs:   Temp Pulse Resp BP SpO2   12/22/21 1411 98.4 °F (36.9 °C) (!) 55 17 114/75 98 %       Vital Signs-Reviewed the patient's vital signs. Pulse Oximetry Analysis, Cardiac Monitor, 12 lead ekg:      Interpreted by the EP. Records Reviewed: Nursing notes reviewed (Time of Review: 2:41 PM)    Procedures:   Critical Care Time:   Aspirin: (was aspirin given for stroke?)    Diagnosis     Clinical Impression:   1. Chest pain, unspecified type        Disposition: Discharged      Follow-up Information     Follow up With Specialties Details Why Contact Info    Ebb Self, MD Family Medicine In 2 days  [facility] [address]  [phone]             Patient's Medications   Start Taking    No medications on file   Continue Taking    ASPIRIN DELAYED-RELEASE 81 MG TABLET    TAKE 1 TABLET BY MOUTH EVERY DAY    DULOXETINE (CYMBALTA) 60 MG CAPSULE    Take 1 Cap by mouth daily. GABAPENTIN (NEURONTIN) 300 MG CAPSULE    2 caps QAM, 1 cap at noon, 1 cap at dinner, and 2 caps at HS    LIDOCAINE (LIDODERM) 5 %    APPLY 1 NEW PATCH TO SKIN FOR LEFT DORSAL PAIN UP TO 3 PATCHES EACH TIME Q 12 HOURS PRN. MAGNESIUM OXIDE (MAG-OX) 400 MG TABLET    Take 1 Tablet by mouth daily. METOPROLOL TARTRATE (LOPRESSOR) 25 MG TABLET    TAKE 1/2 TABLET BY MOUTH TWICE A DAY    MULTIVITAMIN (ONE A DAY) TABLET    Take 1 Tab by mouth daily.     NITROGLYCERIN (NITROSTAT) 0.4 MG SL TABLET    DISSOLVE ONE TABLET UNDER THE TONGUE AT THE START OF CHEST PAIN EVERY 5 MINUTES UP TO 3 DOSES    OMEGA-3 ACID ETHYL ESTERS (LOVAZA) 1 GRAM CAPSULE    Take 1 Cap by mouth nightly. These Medications have changed    No medications on file   Stop Taking    CARBAMAZEPINE (TEGRETOL) 200 MG TABLET    TAKE 1 TABLET BY MOUTH TWICE A DAY    DIAZEPAM (VALIUM) 5 MG TABLET    Take one tablet by mouth 30 minutes before procedure    OLMESARTAN-HYDROCHLOROTHIAZIDE (BENICAR HCT) 20-12.5 MG PER TABLET    Take 1 Tablet by mouth daily.      _______________________________    Notes:    Ngoc Gandara MD using Dragon dictation      _______________________________

## 2021-12-22 NOTE — TELEPHONE ENCOUNTER
Last Visit: 10/28/2021 with MD Rupesh Gordon   Next Appointment: 3/2/2022 with MD Rupesh Gordon     Requested Prescriptions     Pending Prescriptions Disp Refills    nitroglycerin (NITROSTAT) 0.4 mg SL tablet 25 Tablet 1     Sig: Dissolve one tablet under the tongue at the start of chest pain every 5 minutes up to 3 doses.

## 2021-12-22 NOTE — TELEPHONE ENCOUNTER
----- Message from Laron Becker sent at 12/22/2021 12:10 PM EST -----  Subject: Refill Request    QUESTIONS  Name of Medication? Other - nitroglycerin - .4 SL tablet   Patient-reported dosage and instructions? take one by tablet by mouth as   necessary for chest pains  How many days do you have left? 0  Preferred Pharmacy? [id number]  Pharmacy phone number (if available)? [phone]  Additional Information for Provider? Patient not feeling well and   wondering how long it will take; PCP Jose Carlos Colmenares  ---------------------------------------------------------------------------  --------------  [address]  What is the best way for the office to contact you? OK to leave message on   voicemail  Preferred Call Back Phone Number?  [phone]

## 2021-12-22 NOTE — ED NOTES
Pt discharged at this time. This RN reviewed discharge instructions at this time with the pt, & pt does not have any questions. Pt ambulatory upon discharge, & in stable condition. Pt armband removed & shredded. Pt IV removed.

## 2021-12-23 LAB
ATRIAL RATE: 57 BPM
CALCULATED P AXIS, ECG09: 24 DEGREES
CALCULATED R AXIS, ECG10: 62 DEGREES
CALCULATED T AXIS, ECG11: 134 DEGREES
DIAGNOSIS, 93000: NORMAL
P-R INTERVAL, ECG05: 158 MS
Q-T INTERVAL, ECG07: 414 MS
QRS DURATION, ECG06: 90 MS
QTC CALCULATION (BEZET), ECG08: 402 MS
VENTRICULAR RATE, ECG03: 57 BPM

## 2021-12-27 RX ORDER — NITROGLYCERIN 0.4 MG/1
TABLET SUBLINGUAL
Qty: 25 TABLET | Refills: 0 | Status: SHIPPED | OUTPATIENT
Start: 2021-12-27

## 2021-12-27 NOTE — TELEPHONE ENCOUNTER
Called patient to check on status of of chest pain, to see if went to ER and to let know to call cardiologist for a follow-up appointment as well as to get further refills from cardiologist. No answer,left message to call office when possible.

## 2021-12-28 ENCOUNTER — TRANSCRIBE ORDER (OUTPATIENT)
Dept: SCHEDULING | Age: 51
End: 2021-12-28

## 2021-12-28 DIAGNOSIS — Z12.31 ENCOUNTER FOR SCREENING MAMMOGRAM FOR BREAST CANCER: Primary | ICD-10-CM

## 2022-01-01 DIAGNOSIS — G50.0 TRIGEMINAL NEURALGIA OF LEFT SIDE OF FACE: ICD-10-CM

## 2022-01-03 RX ORDER — CARBAMAZEPINE 200 MG/1
TABLET ORAL
Qty: 180 TABLET | Refills: 1 | Status: SHIPPED | OUTPATIENT
Start: 2022-01-03 | End: 2022-07-08

## 2022-01-03 RX ORDER — METOPROLOL TARTRATE 25 MG/1
TABLET, FILM COATED ORAL
Qty: 90 TABLET | Refills: 2 | Status: SHIPPED | OUTPATIENT
Start: 2022-01-03

## 2022-01-03 NOTE — TELEPHONE ENCOUNTER
Last Visit: 10/28/21 with MD Trisha Valentine  Next Appointment: 3/2/22 with MD Trisha Valentine  Previous Refill Encounter(s): 9/27/21 #180    Requested Prescriptions     Pending Prescriptions Disp Refills    carBAMazepine (TEGretol) 200 mg tablet [Pharmacy Med Name: CARBAMAZEPINE 200 MG TABLET] 180 Tablet 1     Sig: TAKE 1 TABLET BY MOUTH TWICE A DAY

## 2022-01-25 ENCOUNTER — HOSPITAL ENCOUNTER (OUTPATIENT)
Dept: MAMMOGRAPHY | Age: 52
Discharge: HOME | End: 2022-01-25
Attending: FAMILY MEDICINE

## 2022-01-25 ENCOUNTER — OFFICE VISIT (OUTPATIENT)
Dept: CARDIOLOGY CLINIC | Age: 52
End: 2022-01-25
Payer: COMMERCIAL

## 2022-01-25 VITALS
BODY MASS INDEX: 47.48 KG/M2 | WEIGHT: 268 LBS | SYSTOLIC BLOOD PRESSURE: 122 MMHG | DIASTOLIC BLOOD PRESSURE: 74 MMHG | OXYGEN SATURATION: 97 % | HEART RATE: 61 BPM | HEIGHT: 63 IN

## 2022-01-25 DIAGNOSIS — Z12.31 ENCOUNTER FOR SCREENING MAMMOGRAM FOR BREAST CANCER: ICD-10-CM

## 2022-01-25 DIAGNOSIS — I24.9 ACS (ACUTE CORONARY SYNDROME) (HCC): ICD-10-CM

## 2022-01-25 DIAGNOSIS — I25.10 CORONARY ARTERY DISEASE INVOLVING NATIVE CORONARY ARTERY OF NATIVE HEART WITHOUT ANGINA PECTORIS: Primary | ICD-10-CM

## 2022-01-25 PROCEDURE — 99214 OFFICE O/P EST MOD 30 MIN: CPT | Performed by: INTERNAL MEDICINE

## 2022-01-25 PROCEDURE — 93000 ELECTROCARDIOGRAM COMPLETE: CPT | Performed by: INTERNAL MEDICINE

## 2022-01-25 NOTE — PROGRESS NOTES
HISTORY OF PRESENT ILLNESS  Amber Root is a 47 y.o. female. ASSESSMENT and PLAN    Ms. Lilia Lynch has history of CAD. She presented back in December 2011 with atypical chest pains but abnormal EKG, and abnormal nuclear scan. Her coronary angiogram revealed ostial 90% LAD stenosis; FFR was performed with intrinsic value of 0.87 and adenosine induced 0.66. She subsequently underwent single vessel LIMA to LAD bypass surgery. Since her bypass surgery, she presented twice with chest pains to the emergency room which were again quite atypical. She had nuclear scans done both instances which were normal.  Back in November 2016, she [facility] with [facility].  Nuclear scan showed ejection fraction of 60% but apical filling defect.  Ultimately, repeat coronary angiography was performed.  It was noted that her HECTOR to LAD was widely patent.    Again, in March 2020, she presented with chest pains (shoulder and jaw pain), and abnormal nuclear scan involving the basal anterior wall, with decline in EF to 40%.  She underwent coronary angiography which revealed widely patent LIMA to LAD with ostial LAD moderate stenosis.  Continue medical therapy was recommended. · CAD:    Clinically stable. · BP:    Well controlled. · Rhythm:    Stable sinus. · CHF:    There is no evidence of decompensated CHF noted. · Weight:     Her weight today is 268 pounds. This is unchanged from her previous. Her target weight is about 200 pounds. I advised her to try to lose about 10-20 pounds every year. · Cholesterol:   Target LDL <70. Crestor 40. · Anti-platelet:   Remains on ASA. I will see her back in 6 months. Thank you. Encounter Diagnoses   Name Primary?     Coronary artery disease involving native coronary artery of native heart without angina pectoris Yes    ACS (acute coronary syndrome) ([facility]      current treatment plan is effective, no change in therapy  lab results and schedule of future lab studies reviewed with patient  reviewed diet, exercise and weight control  cardiovascular risk and specific lipid/LDL goals reviewed  use of aspirin to prevent MI and TIA's discussed      HPI   Today, Ms. Margarita Wooten has no complaints of chest pain. She was last seen in July 2021. In December 2021, she presented to the emergency room with atypical chest pains. She was ruled out for coronary event and was discharged home. No changes were made. Today, she has no complaints of chest pains, she denies any orthopnea or PND. She denies any palpitations or dizziness. Unfortunately, she has not been able lose any significant weight. Review of Systems   Respiratory: Negative for shortness of breath. Cardiovascular: Negative for chest pain, palpitations, orthopnea, claudication, leg swelling and PND. All other systems reviewed and are negative. Physical Exam  Vitals and nursing note reviewed. Constitutional:       Appearance: She is obese. HENT:      Head: Normocephalic. Eyes:      Conjunctiva/sclera: Conjunctivae normal.   Neck:      Vascular: No carotid bruit. Cardiovascular:      Rate and Rhythm: Normal rate and regular rhythm. Pulmonary:      Breath sounds: Normal breath sounds. Abdominal:      Palpations: Abdomen is soft. Musculoskeletal:         General: No swelling. Cervical back: No rigidity. Skin:     General: Skin is warm and dry. Neurological:      General: No focal deficit present. Mental Status: She is alert and oriented to person, place, and time. Psychiatric:         Mood and Affect: Mood normal.         Behavior: Behavior normal.         PCP: Naomi Betts MD    Past Medical History:   Diagnosis Date    Abnormal PFT 10/1/2015    Dr. Drew Schroeder, Pulmonology    Acid reflux     CABG x 1 12/31/2011    LIMA-LAD    CAD (coronary artery disease)     Cardiac cath 11/23/2016    R dom. oLAD 85%. Otherwise patent coronaries. HECTOR to LAD patent. LVEDP 22 mmHg. EF 55%. No RWMA.  Cardiac echocardiogram 2015    Tech difficult. EF 50-55%. No WMA. Mild conc LVH. Gr 2 DDfx. RVSP 45-50 mmHg. Mild LAE. Mild ROSITA. Mod TR. Mild IVCE.  Cardiac nuclear imaging test, high risk 2016    High risk. Mainly fixed anteroapical defect w/partial reversibility. EF 60%. No RWMA. Nondiagnostic EKG on pharm stress test.    Cardiovascular upper extremity arterial duplex 2014    No significant occlusive arterial disease bilaterally.  Coronary atherosclerosis of native coronary artery     Diabetes mellitus ([facility] 14    hgbA1C 6.8    Diabetic eye exam ([facility]     Dyslipidemia     GERD (gastroesophageal reflux disease)     H/O screening mammography 2016    no evidence of malignancy    Hypercholesterolemia     Hypomagnesemia 14    1.4    Pleural effusion, left     s/p CABG and thoracentesis with removal of 900 mL    Pulmonary arterial hypertension ([address]        Past Surgical History:   Procedure Laterality Date    CARDIAC CATHETERIZATION  2016         CORONARY ARTERY ANGIOGRAM  2016         HX  SECTION      HX CORONARY ARTERY BYPASS GRAFT  12/31/2011    x1    LV ANGIOGRAPHY  2016         RI CARDIAC SURG PROCEDURE UNLIST         Current Outpatient Medications   Medication Sig Dispense Refill    carBAMazepine (TEGretol) 200 mg tablet TAKE 1 TABLET BY MOUTH TWICE A  Tablet 1    metoprolol tartrate (LOPRESSOR) 25 mg tablet TAKE 1/2 TABLET BY MOUTH TWICE A DAY 90 Tablet 2    nitroglycerin (NITROSTAT) 0.4 mg SL tablet Dissolve one tablet under the tongue at the start of chest pain every 5 minutes up to 3 doses. NOTIFY MD IF > 3 TABS NEEDED. 25 Tablet 0    magnesium oxide (MAG-OX) 400 mg tablet Take 1 Tablet by mouth daily.  90 Tablet 1    aspirin delayed-release 81 mg tablet TAKE 1 TABLET BY MOUTH EVERY DAY 90 Tablet 1    gabapentin (NEURONTIN) 300 mg capsule 2 caps QAM, 1 cap at noon, 1 cap at dinner, and 2 caps at HS 1 Cap 0    lidocaine (LIDODERM) 5 % APPLY 1 NEW PATCH TO SKIN FOR LEFT DORSAL PAIN UP TO 3 PATCHES EACH TIME Q 12 HOURS PRN.  DULoxetine (CYMBALTA) 60 mg capsule Take 1 Cap by mouth daily. 90 Cap 1    multivitamin (ONE A DAY) tablet Take 1 Tab by mouth daily. 30 Tab 11    omega-3 acid ethyl esters (LOVAZA) 1 gram capsule Take 1 Cap by mouth nightly. 80 Cap 3       The patient has a family history of    Social History     Tobacco Use    Smoking status: Never Smoker    Smokeless tobacco: Never Used    Tobacco comment: 2nd home smoking from mother during 1st 17 yrs of life   Vaping Use    Vaping Use: Never used   Substance Use Topics    Alcohol use: No     Alcohol/week: 1.0 standard drink     Types: 1 Standard drinks or equivalent per week    Drug use: No       Lab Results   Component Value Date/Time    Cholesterol, total 276 (H) 11/01/2021 02:51 PM    HDL Cholesterol 70 (H) 11/01/2021 02:51 PM    LDL, calculated 178.8 (H) 11/01/2021 02:51 PM    Triglyceride 136 11/01/2021 02:51 PM    CHOL/HDL Ratio 3.9 11/01/2021 02:51 PM        BP Readings from Last 3 Encounters:   01/25/22 122/74   12/22/21 114/75   10/28/21 127/81        Pulse Readings from Last 3 Encounters:   01/25/22 61   12/22/21 (!) 55   10/28/21 (!) 56       Wt Readings from Last 3 Encounters:   01/25/22 121.6 kg (268 lb)   12/22/21 122.5 kg (270 lb)   10/28/21 121.2 kg (267 lb 3.2 oz)         EKG: unchanged from previous tracings, normal sinus rhythm, nonspecific ST and T waves changes, T wave inversions in anterolateral leads.

## 2022-01-25 NOTE — PROGRESS NOTES
Amandane Andreas presents today for   Chief Complaint   Patient presents with    Follow-up     6 month follow up- no complaints        Nadira Johns preferred language for health care discussion is english/other. Is someone accompanying this pt? no    Is the patient using any DME equipment during [address]? no    Depression Screening:  3 most recent PHQ Screens 1/25/2022   PHQ Not Done -   Little interest or pleasure in doing things Not at all   Feeling down, depressed, irritable, or hopeless Not at all   Total Score PHQ 2 0   Trouble falling or staying asleep, or sleeping too much -   Feeling tired or having little energy -   Poor appetite, weight loss, or overeating -   Feeling bad about yourself - or that you are a failure or have let yourself or your family down -   Trouble concentrating on things such as school, work, reading, or watching TV -   Moving or speaking so slowly that other people could have noticed; or the opposite being so fidgety that others notice -   Thoughts of being better off dead, or hurting yourself in some way -   PHQ 9 Score -   How difficult have these problems made it for you to do your work, take care of your home and get along with others -       Learning Assessment:  Learning Assessment 3/17/2017   PRIMARY LEARNER Patient   HIGHEST LEVEL OF EDUCATION - PRIMARY LEARNER  -   BARRIERS PRIMARY LEARNER -   CO-LEARNER CAREGIVER -   PRIMARY LANGUAGE ENGLISH   LEARNER PREFERENCE PRIMARY DEMONSTRATION   ANSWERED BY patient   RELATIONSHIP SELF       Abuse Screening:  Abuse Screening Questionnaire 1/25/2022   Do you ever feel afraid of your partner? N   Are you in a relationship with someone who physically or mentally threatens you? N   Is it safe for you to go home? Y       Fall Risk  No flowsheet data found. Pt currently taking Anticoagulant therapy? no    Coordination of Care:  1. Have you been to the ER, urgent care clinic since your last visit? Hospitalized since your last visit? no    2. Have you seen or consulted any other health care providers outside of the [address] [place] [address] since your last visit? Include any pap smears or colon screening.  no

## 2022-02-19 ENCOUNTER — HOSPITAL ENCOUNTER (OUTPATIENT)
Dept: MAMMOGRAPHY | Age: 52
Discharge: HOME | End: 2022-02-19
Attending: FAMILY MEDICINE
Payer: COMMERCIAL

## 2022-02-19 PROCEDURE — 77063 BREAST TOMOSYNTHESIS BI: CPT

## 2022-03-02 ENCOUNTER — OFFICE VISIT (OUTPATIENT)
Dept: FAMILY MEDICINE CLINIC | Age: 52
End: 2022-03-02
Payer: COMMERCIAL

## 2022-03-02 VITALS
SYSTOLIC BLOOD PRESSURE: 94 MMHG | OXYGEN SATURATION: 95 % | HEIGHT: 63 IN | BODY MASS INDEX: 47.59 KG/M2 | HEART RATE: 60 BPM | TEMPERATURE: 96.9 F | WEIGHT: 268.6 LBS | RESPIRATION RATE: 12 BRPM | DIASTOLIC BLOOD PRESSURE: 67 MMHG

## 2022-03-02 DIAGNOSIS — I10 ESSENTIAL HYPERTENSION: ICD-10-CM

## 2022-03-02 DIAGNOSIS — E11.65 TYPE 2 DIABETES MELLITUS WITH HYPERGLYCEMIA, WITHOUT LONG-TERM CURRENT USE OF INSULIN (HCC): Primary | ICD-10-CM

## 2022-03-02 PROCEDURE — 99214 OFFICE O/P EST MOD 30 MIN: CPT | Performed by: FAMILY MEDICINE

## 2022-03-02 RX ORDER — LOSARTAN POTASSIUM 25 MG/1
25 TABLET ORAL DAILY
Qty: 90 TABLET | Refills: 3 | Status: SHIPPED | OUTPATIENT
Start: 2022-03-02

## 2022-03-02 RX ORDER — ATORVASTATIN CALCIUM 20 MG/1
20 TABLET, FILM COATED ORAL DAILY
Qty: 90 TABLET | Refills: 3 | Status: SHIPPED | OUTPATIENT
Start: 2022-03-02

## 2022-03-02 NOTE — PROGRESS NOTES
1. \"Have you been to the ER, urgent care clinic since your last visit? Hospitalized since your last visit? \" Yes When: 12- Where: [facility] ER Reason for visit: CP     2. \"Have you seen or consulted any other health care providers outside of the [address] [place] [address] since your last visit? \" No     3. For patients aged 39-70: Has the patient had a colonoscopy / FIT/ Cologuard? Yes - no Care Gap present      If the patient is female:    4. For patients aged 41-77: Has the patient had a mammogram within the past 2 years? Yes - no Care Gap present      5. For patients aged 21-65: Has the patient had a pap smear?  Yes - no Care Gap present

## 2022-03-02 NOTE — PROGRESS NOTES
HPI:  Rea Sanchez is a 47 y.o. female who presents today with   Chief Complaint   Patient presents with    Diabetes     4 m f/u     Hypertension        Still has a pain in her left axilla; denies that she does any repetitive motions with the upper arms. Patient's blood pressure is on the low side of normal today. She states she has not drank much today. She does drink a lot of tea. Patient advised to hydrate well and consider free water more so    Patient has a cardiologist.  She has been told that the pain in her upper arms is not related to her heart. Patient is due for labs. Patient has diabetes. She is not on an ARB and she is not on a statin. Standard of care measures reviewed with patient. She agrees to start these medications. We will add low-dose losartan at 25 mg daily as well as a atorvastatin. Again patient is reiterated to hydrate and to avoid excessive caffeine intake via tea.           3 most recent PHQ Screens 3/2/2022   PHQ Not Done Patient refuses   Little interest or pleasure in doing things Not at all   Feeling down, depressed, irritable, or hopeless Not at all   Total Score PHQ 2 0   Trouble falling or staying asleep, or sleeping too much -   Feeling tired or having little energy -   Poor appetite, weight loss, or overeating -   Feeling bad about yourself - or that you are a failure or have let yourself or your family down -   Trouble concentrating on things such as school, work, reading, or watching TV -   Moving or speaking so slowly that other people could have noticed; or the opposite being so fidgety that others notice -   Thoughts of being better off dead, or hurting yourself in some way -   PHQ 9 Score -   How difficult have these problems made it for you to do your work, take care of your home and get along with others -               PMH,  Meds, Allergies, Family History, Social history reviewed      Current Outpatient Medications   Medication Sig Dispense Refill  carBAMazepine (TEGretol) 200 mg tablet TAKE 1 TABLET BY MOUTH TWICE A  Tablet 1    metoprolol tartrate (LOPRESSOR) 25 mg tablet TAKE 1/2 TABLET BY MOUTH TWICE A DAY 90 Tablet 2    nitroglycerin (NITROSTAT) 0.4 mg SL tablet Dissolve one tablet under the tongue at the start of chest pain every 5 minutes up to 3 doses. NOTIFY MD IF > 3 TABS NEEDED. 25 Tablet 0    magnesium oxide (MAG-OX) 400 mg tablet Take 1 Tablet by mouth daily. 90 Tablet 1    aspirin delayed-release 81 mg tablet TAKE 1 TABLET BY MOUTH EVERY DAY 90 Tablet 1    gabapentin (NEURONTIN) 300 mg capsule 2 caps QAM, 1 cap at noon, 1 cap at dinner, and 2 caps at HS 1 Cap 0    lidocaine (LIDODERM) 5 % APPLY 1 NEW PATCH TO SKIN FOR LEFT DORSAL PAIN UP TO 3 PATCHES EACH TIME Q 12 HOURS PRN.  DULoxetine (CYMBALTA) 60 mg capsule Take 1 Cap by mouth daily. 90 Cap 1    multivitamin (ONE A DAY) tablet Take 1 Tab by mouth daily. 30 Tab 11    omega-3 acid ethyl esters (LOVAZA) 1 gram capsule Take 1 Cap by mouth nightly. 90 Cap 3        Allergies   Allergen Reactions    Contrave [Naltrexone-Bupropion] Other (comments)     Severe constipation    Metformin Nausea Only                  ROS as per HPI      Visit Vitals  BP 94/67 (BP 1 Location: Left upper arm, BP Patient Position: Sitting, BP Cuff Size: Large adult)   Pulse 60   Temp 96.9 °F (36.1 °C) (Temporal)   Resp 12   Ht 5' 3\" (1.6 m)   Wt 268 lb 9.6 oz (121.8 kg)   SpO2 95%   BMI 47.58 kg/m²     Physical Exam   General appearance: alert, cooperative, no distress, appears stated age  Neck: supple, symmetrical, trachea midline, no adenopathy, thyroid: not enlarged, symmetric, no tenderness/mass/nodules, no carotid bruit and no JVD  Lungs: clear to auscultation bilaterally  Heart: regular rate and rhythm, S1, S2 normal, no murmur, click, rub or gallop  Extremities: extremities normal, atraumatic, no cyanosis or edema      Assessment/Plan:  Diagnoses and all orders for this visit:    1.  Type 2 diabetes mellitus with hyperglycemia, without long-term current use of insulin ([facility]  -     LIPID PANEL; Future  -     METABOLIC PANEL, COMPREHENSIVE; Future  -     HEMOGLOBIN A1C WITH EAG; Future  -     MICROALBUMIN, UR, RAND W/ MICROALB/CREAT RATIO; Future  -     LIPID PANEL; Future  -     METABOLIC PANEL, COMPREHENSIVE; Future  -     HEMOGLOBIN A1C WITH EAG; Future  -     MICROALBUMIN, UR, RAND W/ MICROALB/CREAT RATIO; Future    2. Essential hypertension    Other orders  -     atorvastatin (LIPITOR) 20 mg tablet; Take 1 Tablet by mouth daily. -     losartan (COZAAR) 25 mg tablet; Take 1 Tablet by mouth daily. As above  Patient stable  Labs as ordered  Lipitor and losartan as ordered  Follow-up and Dispositions    · Return in about 3 months (around 6/2/2022) for well exam.       This has been fully explained to the patient, who indicates understanding. An After Visit Summary was printed and given to the patient.              Mary Lin MD

## 2022-03-02 NOTE — PATIENT INSTRUCTIONS
Noninsulin Medicines for Type 2 Diabetes: Care Instructions  Overview     There are different types of noninsulin medicines for diabetes. Each works in a different way. But they all help you control your blood sugar. Some types help your body make insulin to lower your blood sugar. Others lower how much insulin your body needs. Some can slow how fast your body digests sugars. And some can remove extra glucose through your urine. You may need to take more than one medicine for diabetes. Two or more medicines may work better to lower your blood sugar level than just one does. · Metformin. This lowers how much glucose your liver makes. And it helps you respond better to insulin. It also lowers the amount of stored sugar that your liver releases when you are not eating. · Sulfonylureas. These help your body release more insulin. Some work for many hours. They can cause low blood sugar if you don't eat as you planned. An example is glipizide. · Thiazolidinediones. These reduce the amount of blood glucose. They also help you respond better to insulin. An example is pioglitazone. · SGLT2 inhibitors. These help to remove extra glucose through your urine. They may also help some people lose weight. An example is ertugliflozin. · DPP-4 inhibitors. These help your body raise the level of insulin after you eat. They also help your body make less of a hormone that raises blood sugar. An example is alogliptin. · GLP-1 receptor agonists. These help your body make a protein that can raise your insulin level and make you less hungry. They're given as shots or pills. An example is semaglutide. · Meglitinides. These help your body release insulin. They also help slow how your body digests sugars. So they can keep your blood sugar from rising too fast after you eat. · Alpha-glucosidase inhibitors. These keep starches from breaking down. This means that they lower the amount of glucose absorbed when you eat.  They don't help your body make more insulin. So they will not cause low blood sugar unless you use them with other medicines for diabetes. Follow-up care is a key part of your treatment and safety. Be sure to make and go to all appointments, and call your doctor if you are having problems. It's also a good idea to know your test results and keep a list of the medicines you take. How can you care for yourself at home? · Eat a healthy diet. Get some exercise each day. This may help you to reduce how much medicine you need. · Do not take other prescription or over-the-counter medicines, vitamins, herbal products, or supplements without talking to your doctor first. Some medicines for type 2 diabetes can cause problems with other medicines or supplements. · Tell your doctor if you plan to get pregnant. Some of these drugs are not safe for pregnant women. · Be safe with medicines. Take your medicines exactly as prescribed. Meglitinides and sulfonylureas can cause your blood sugar to drop very low. Call your doctor if you think you are having a problem with your medicine. · Check your blood sugar often. You can use a glucose monitor. Keeping track can help you know how certain foods, activities, and medicines affect your blood sugar. And it can help you keep your blood sugar from getting so low that it's not safe. When should you call for help? Call 911 anytime you think you may need emergency care. For example, call if:    · You passed out (lost consciousness).     · You are confused or cannot think clearly.     · Your blood sugar is very high or very low. Watch closely for changes in your health, and be sure to contact your doctor if:    · Your blood sugar stays outside the level your doctor set for you.     · You have any problems. Where can you learn more?   Go to http://www.gray.com/  Enter H153 in the search box to learn more about \"Noninsulin Medicines for Type 2 Diabetes: Care Instructions. \"  Current as of: August 31, 2020               Content Version: 13.0  © 6791-1711 Healthwise, [facility]. Care instructions adapted under license by [employer] (which disclaims liability or warranty for this information). If you have questions about a medical condition or this instruction, always ask your healthcare professional. [place] [address] any warranty or liability for your use of this information.

## 2022-03-14 RX ORDER — ASPIRIN 81 MG/1
TABLET ORAL
Qty: 90 TABLET | Refills: 1 | Status: SHIPPED | OUTPATIENT
Start: 2022-03-14 | End: 2022-10-07

## 2022-03-18 PROBLEM — E11.40 TYPE 2 DIABETES MELLITUS WITH DIABETIC NEUROPATHY (HCC): Status: ACTIVE | Noted: 2018-08-04

## 2022-03-19 PROBLEM — E11.21 TYPE 2 DIABETES WITH NEPHROPATHY (HCC): Status: ACTIVE | Noted: 2019-02-23

## 2022-03-20 PROBLEM — Z56.0 OUT OF WORK: Status: ACTIVE | Noted: 2020-07-06

## 2022-04-09 DIAGNOSIS — G47.62 NOCTURNAL LEG CRAMPS: ICD-10-CM

## 2022-04-09 DIAGNOSIS — E83.42 HYPOMAGNESEMIA: ICD-10-CM

## 2022-04-11 NOTE — TELEPHONE ENCOUNTER
Last Visit: 3/2/22 with MD Rush Fountain  Next Appointment: 6/8/22 with MD Rush Fountain  Previous Refill Encounter(s): 12/9/21 #90 with 1 refill    Requested Prescriptions     Pending Prescriptions Disp Refills    magnesium oxide (MAG-OX) 400 mg tablet 90 Tablet 1     Sig: Take 1 Tablet by mouth daily.

## 2022-04-15 RX ORDER — LANOLIN ALCOHOL/MO/W.PET/CERES
400 CREAM (GRAM) TOPICAL DAILY
Qty: 90 TABLET | Refills: 1 | Status: SHIPPED | OUTPATIENT
Start: 2022-04-15 | End: 2022-07-15

## 2022-05-25 ENCOUNTER — HOSPITAL ENCOUNTER (OUTPATIENT)
Dept: LAB | Age: 52
Discharge: HOME | End: 2022-05-25
Payer: COMMERCIAL

## 2022-05-25 ENCOUNTER — OFFICE VISIT (OUTPATIENT)
Dept: FAMILY MEDICINE CLINIC | Age: 52
End: 2022-05-25
Payer: COMMERCIAL

## 2022-05-25 ENCOUNTER — NURSE TRIAGE (OUTPATIENT)
Dept: OTHER | Facility: CLINIC | Age: 52
End: 2022-05-25

## 2022-05-25 VITALS
BODY MASS INDEX: 48.05 KG/M2 | HEART RATE: 60 BPM | WEIGHT: 271.2 LBS | DIASTOLIC BLOOD PRESSURE: 80 MMHG | SYSTOLIC BLOOD PRESSURE: 122 MMHG | OXYGEN SATURATION: 97 % | HEIGHT: 63 IN | TEMPERATURE: 97.9 F | RESPIRATION RATE: 16 BRPM

## 2022-05-25 DIAGNOSIS — R42 DIZZINESS: ICD-10-CM

## 2022-05-25 DIAGNOSIS — R42 DIZZINESS: Primary | ICD-10-CM

## 2022-05-25 LAB
ALBUMIN SERPL-MCNC: 4.1 G/DL (ref 3.4–5)
ALBUMIN/GLOB SERPL: 1.2 {RATIO} (ref 0.8–1.7)
ALP SERPL-CCNC: 224 U/L (ref 45–117)
ALT SERPL-CCNC: 34 U/L (ref 13–56)
ANION GAP SERPL CALC-SCNC: 8 MMOL/L (ref 3–18)
AST SERPL-CCNC: 25 U/L (ref 10–38)
BASOPHILS # BLD: 0 K/UL (ref 0–0.1)
BASOPHILS NFR BLD: 1 % (ref 0–2)
BILIRUB SERPL-MCNC: 0.3 MG/DL (ref 0.2–1)
BUN SERPL-MCNC: 12 MG/DL (ref 7–18)
BUN/CREAT SERPL: 15 (ref 12–20)
CALCIUM SERPL-MCNC: 9.5 MG/DL (ref 8.5–10.1)
CHLORIDE SERPL-SCNC: 100 MMOL/L (ref 100–111)
CO2 SERPL-SCNC: 27 MMOL/L (ref 21–32)
CREAT SERPL-MCNC: 0.81 MG/DL (ref 0.6–1.3)
DIFFERENTIAL METHOD BLD: ABNORMAL
EOSINOPHIL # BLD: 0.1 K/UL (ref 0–0.4)
EOSINOPHIL NFR BLD: 2 % (ref 0–5)
ERYTHROCYTE [DISTWIDTH] IN BLOOD BY AUTOMATED COUNT: 14.2 % (ref 11.6–14.5)
GLOBULIN SER CALC-MCNC: 3.5 G/DL (ref 2–4)
GLUCOSE SERPL-MCNC: 92 MG/DL (ref 74–99)
HCT VFR BLD AUTO: 38.3 % (ref 35–45)
HGB BLD-MCNC: 12.3 G/DL (ref 12–16)
IMM GRANULOCYTES # BLD AUTO: 0 K/UL (ref 0–0.04)
IMM GRANULOCYTES NFR BLD AUTO: 0 % (ref 0–0.5)
LYMPHOCYTES # BLD: 2.1 K/UL (ref 0.9–3.6)
LYMPHOCYTES NFR BLD: 31 % (ref 21–52)
MCH RBC QN AUTO: 28.3 PG (ref 24–34)
MCHC RBC AUTO-ENTMCNC: 32.1 G/DL (ref 31–37)
MCV RBC AUTO: 88.2 FL (ref 78–100)
MONOCYTES # BLD: 0.5 K/UL (ref 0.05–1.2)
MONOCYTES NFR BLD: 8 % (ref 3–10)
NEUTS SEG # BLD: 4.1 K/UL (ref 1.8–8)
NEUTS SEG NFR BLD: 59 % (ref 40–73)
NRBC # BLD: 0 K/UL (ref 0–0.01)
NRBC BLD-RTO: 0 PER 100 WBC
PLATELET # BLD AUTO: 169 K/UL (ref 135–420)
PMV BLD AUTO: 12.9 FL (ref 9.2–11.8)
POTASSIUM SERPL-SCNC: 4.2 MMOL/L (ref 3.5–5.5)
PROT SERPL-MCNC: 7.6 G/DL (ref 6.4–8.2)
RBC # BLD AUTO: 4.34 M/UL (ref 4.2–5.3)
SODIUM SERPL-SCNC: 135 MMOL/L (ref 136–145)
WBC # BLD AUTO: 7 K/UL (ref 4.6–13.2)

## 2022-05-25 PROCEDURE — 36415 COLL VENOUS BLD VENIPUNCTURE: CPT

## 2022-05-25 PROCEDURE — 85025 COMPLETE CBC W/AUTO DIFF WBC: CPT

## 2022-05-25 PROCEDURE — 80053 COMPREHEN METABOLIC PANEL: CPT

## 2022-05-25 PROCEDURE — 99214 OFFICE O/P EST MOD 30 MIN: CPT | Performed by: FAMILY MEDICINE

## 2022-05-25 RX ORDER — MECLIZINE HYDROCHLORIDE 25 MG/1
25 TABLET ORAL
Qty: 30 TABLET | Refills: 0 | Status: SHIPPED | OUTPATIENT
Start: 2022-05-25 | End: 2022-06-04

## 2022-05-25 NOTE — PATIENT INSTRUCTIONS
Dizziness: Care Instructions  Your Care Instructions  Dizziness is the feeling of unsteadiness or fuzziness in your head. It is different than having vertigo, which is a feeling that the room is spinning or that you are moving or falling. It is also different from lightheadedness, which is the feeling that you are about to faint. It can be hard to know what causes dizziness. Some people feel dizzy when they have migraine headaches. Sometimes bouts of flu can make you feel dizzy. Some medical conditions, such as heart problems or high blood pressure, can make you feel dizzy. Many medicines can cause dizziness, including medicines for high blood pressure, pain, or anxiety. If a medicine causes your symptoms, your doctor may recommend that you stop or change the medicine. If it is a problem with your heart, you may need medicine to help your heart work better. If there is no clear reason for your symptoms, your doctor may suggest watching and waiting for a while to see if the dizziness goes away on its own. Follow-up care is a key part of your treatment and safety. Be sure to make and go to all appointments, and call your doctor if you are having problems. It's also a good idea to know your test results and keep a list of the medicines you take. How can you care for yourself at home? · If your doctor recommends or prescribes medicine, take it exactly as directed. Call your doctor if you think you are having a problem with your medicine. · Do not drive while you feel dizzy. · Try to prevent falls. Steps you can take include:  ? Using nonskid mats, adding grab bars near the tub, and using night-lights. ? Clearing your home so that walkways are free of anything you might trip on.  ? Letting family and friends know that you have been feeling dizzy. This will help them know how to help you. When should you call for help? Call 911 anytime you think you may need emergency care.  For example, call if:    · You passed out (lost consciousness).     · You have dizziness along with symptoms of a heart attack. These may include:  ? Chest pain or pressure, or a strange feeling in the chest.  ? Sweating. ? Shortness of breath. ? Nausea or vomiting. ? Pain, pressure, or a strange feeling in the back, neck, jaw, or upper belly or in one or both shoulders or arms. ? Lightheadedness or sudden weakness. ? A fast or irregular heartbeat.     · You have symptoms of a stroke. These may include:  ? Sudden numbness, tingling, weakness, or loss of movement in your face, arm, or leg, especially on only one side of your body. ? Sudden vision changes. ? Sudden trouble speaking. ? Sudden confusion or trouble understanding simple statements. ? Sudden problems with walking or balance. ? A sudden, severe headache that is different from past headaches. Call your doctor now or seek immediate medical care if:    · You feel dizzy and have a fever, headache, or ringing in your ears.     · You have new or increased nausea and vomiting.     · Your dizziness does not go away or comes back. Watch closely for changes in your health, and be sure to contact your doctor if:    · You do not get better as expected. Where can you learn more? Go to http://www.gray.com/  Enter Q823 in the search box to learn more about \"Dizziness: Care Instructions. \"  Current as of: July 1, 2021               Content Version: 13.2  © 7335-7463 [employer]. Care instructions adapted under license by [employer] (which disclaims liability or warranty for this information). If you have questions about a medical condition or this instruction, always ask your healthcare professional. [place] [address] any warranty or liability for your use of this information.

## 2022-05-25 NOTE — PROGRESS NOTES
HPI:  Tamara Santiago is a 47 y.o. female who presents today with   Chief Complaint   Patient presents with    Dizziness     x's 6 days    Headache    Fatigue        Has had a week's h/o dizziness off and on . She notes the dizziness is positional;   Gets tired especially after eating; Has hot flashes more often; no ear pain; The fatigue  And lack of ability to do things has more caused her depression. No other new physical sx. No spinning sensation ; no ear pain or tinnitus        3 most recent PHQ Screens 5/25/2022   PHQ Not Done -   Little interest or pleasure in doing things Nearly every day   Feeling down, depressed, irritable, or hopeless Nearly every day   Total Score PHQ 2 6   Trouble falling or staying asleep, or sleeping too much Not at all   Feeling tired or having little energy Nearly every day   Poor appetite, weight loss, or overeating Not at all   Feeling bad about yourself - or that you are a failure or have let yourself or your family down Several days   Trouble concentrating on things such as school, work, reading, or watching TV Not at all   Moving or speaking so slowly that other people could have noticed; or the opposite being so fidgety that others notice Several days   Thoughts of being better off dead, or hurting yourself in some way Not at all   PHQ 9 Score 11   How difficult have these problems made it for you to do your work, take care of your home and get along with others Somewhat difficult               PMH,  Meds, Allergies, Family History, Social history reviewed      Current Outpatient Medications   Medication Sig Dispense Refill    magnesium oxide (MAG-OX) 400 mg tablet Take 1 Tablet by mouth daily. 90 Tablet 1    aspirin delayed-release 81 mg tablet TAKE 1 TABLET BY MOUTH EVERY DAY 90 Tablet 1    atorvastatin (LIPITOR) 20 mg tablet Take 1 Tablet by mouth daily. 90 Tablet 3    losartan (COZAAR) 25 mg tablet Take 1 Tablet by mouth daily.  90 Tablet 3    carBAMazepine (TEGretol) 200 mg tablet TAKE 1 TABLET BY MOUTH TWICE A  Tablet 1    metoprolol tartrate (LOPRESSOR) 25 mg tablet TAKE 1/2 TABLET BY MOUTH TWICE A DAY 90 Tablet 2    nitroglycerin (NITROSTAT) 0.4 mg SL tablet Dissolve one tablet under the tongue at the start of chest pain every 5 minutes up to 3 doses. NOTIFY MD IF > 3 TABS NEEDED. 25 Tablet 0    gabapentin (NEURONTIN) 300 mg capsule 2 caps QAM, 1 cap at noon, 1 cap at dinner, and 2 caps at HS (Patient taking differently: three (3) times daily. 2 caps QAM, 1 cap at noon, 1 cap at dinner, and 2 caps at HS) 1 Cap 0    lidocaine (LIDODERM) 5 % APPLY 1 NEW PATCH TO SKIN FOR LEFT DORSAL PAIN UP TO 3 PATCHES EACH TIME Q 12 HOURS PRN.  DULoxetine (CYMBALTA) 60 mg capsule Take 1 Cap by mouth daily. 90 Cap 1    multivitamin (ONE A DAY) tablet Take 1 Tab by mouth daily. 30 Tab 11    omega-3 acid ethyl esters (LOVAZA) 1 gram capsule Take 1 Cap by mouth nightly. 90 Cap 3        Allergies   Allergen Reactions    Contrave [Naltrexone-Bupropion] Other (comments)     Severe constipation    Metformin Nausea Only                  ROS as per HPI      Visit Vitals  /80 (BP 1 Location: Right upper arm, BP Patient Position: Sitting, BP Cuff Size: Large adult)   Pulse 60   Temp 97.9 °F (36.6 °C) (Temporal)   Resp 16   Ht 5' 3\" (1.6 m)   Wt 271 lb 3.2 oz (123 kg)   SpO2 97%   BMI 48.04 kg/m²     Physical Exam   General appearance: alert, cooperative, no distress, appears stated age; TMs clear  Neck: supple, symmetrical, trachea midline, no adenopathy, thyroid: not enlarged, symmetric, no tenderness/mass/nodules, no carotid bruit and no JVD  Lungs: clear to auscultation bilaterally  Heart: regular rate and rhythm, S1, S2 normal, no murmur, click, rub or gallop    Extremities: extremities normal, atraumatic, no cyanosis or edema  Neurological exam reveals alert, oriented, normal speech, no focal findings or movement disorder noted.   CN 2-12 intact ( 7 not tested)    Assessment/Plan:    Diagnoses and all orders for this visit:    1. Dizziness  -     METABOLIC PANEL, COMPREHENSIVE; Future  -     CBC WITH AUTOMATED DIFF; Future    Other orders  -     meclizine (ANTIVERT) 25 mg tablet; Take 1 Tablet by mouth three (3) times daily as needed for Dizziness for up to 10 days. As above  New   treatment plan as listed below  Orders Placed This Encounter    METABOLIC PANEL, COMPREHENSIVE    CBC WITH AUTOMATED DIFF    meclizine (ANTIVERT) 25 mg tablet     Follow-up and Dispositions    · Return for June 8 as scheduled. This has been fully explained to the patient, who indicates understanding. [facility] and pt has been advised of same.            Jessica Atwood MD

## 2022-05-25 NOTE — TELEPHONE ENCOUNTER
Received call from Cass at [facility] with Red Flag Complaint. Subjective: Caller states \"I am having lightheadedness and dizzy\"     Current Symptoms: dizziness- feels off balance, spinning sensation, occurs when standing up, bending over, has not felt like fainting and has had no nausea. Fatigue- tired, just doesn't feel good    Heart rate via pulse ox is- 61(O2 reading 90%-94%)    B/P- 134/86    Not sure what is causing dizziness. Constant headache-not a new symptom- has had for a while. Blurred vision occurring more often,, but not worsening blurriness    Denies SOB, chest pain,diarrhea, bleeding,ear pain, runny nose, sore throat,weakness, numbness/tingling. Onset: 2 days ago; not worsening, but not getting better. Associated Symptoms: NA    Pain Severity: 7/10; throbbing; constant    Temperature: unknown     What has been tried: nothing    LMP: NA Pregnant: NA    Recommended disposition: Go to Office Now    Care advice provided, patient verbalizes understanding; denies any other questions or concerns; instructed to call back for any new or worsening symptoms. Patient/Caller agrees with recommended disposition; writer provided warm transfer to [facility] at [facility] for appointment scheduling    Attention Provider: Thank you for allowing me to participate in the care of your patient. The patient was connected to triage in response to information provided to the [facility]. Please do not respond through this encounter as the response is not directed to a shared pool.       Reason for Disposition   Spinning or tilting sensation (vertigo) present now    Protocols used: DIZZINESS-ADULT-[place]

## 2022-05-25 NOTE — PROGRESS NOTES
1. \"Have you been to the ER, urgent care clinic since your last visit? Hospitalized since your last visit? \" No    2. \"Have you seen or consulted any other health care providers outside of the [address] [place] [address] since your last visit? \" No     3. For patients aged 39-70: Has the patient had a colonoscopy / FIT/ Cologuard? Yes - no Care Gap present      If the patient is female:    4. For patients aged 41-77: Has the patient had a mammogram within the past 2 years? Yes - no Care Gap present      5. For patients aged 21-65: Has the patient had a pap smear?  Yes - no Care Gap present

## 2022-06-07 ENCOUNTER — TELEPHONE (OUTPATIENT)
Dept: FAMILY MEDICINE CLINIC | Age: 52
End: 2022-06-07

## 2022-06-07 NOTE — TELEPHONE ENCOUNTER
----- Message from Ruchi Castanon sent at 6/7/2022  9:51 AM EDT -----  Subject: Referral Request    QUESTIONS   Reason for referral request? patient request to order blood work before   her next appt. physical which is 7/20/22 please call patient when blood   order is ready   Has the physician seen you for this condition before? No   Preferred Specialist (if applicable)? Do you already have an appointment scheduled? No  Additional Information for Provider?   ---------------------------------------------------------------------------  --------------  CALL BACK INFO  What is the best way for the office to contact you? OK to leave message on   voicemail, OK to respond with electronic message via [employer] portal (only   for patients who have registered [employer] account)  Preferred Call Back Phone Number? [phone]  ---------------------------------------------------------------------------  --------------  SCRIPT ANSWERS  Relationship to Patient?  Self

## 2022-07-04 DIAGNOSIS — G50.0 TRIGEMINAL NEURALGIA OF LEFT SIDE OF FACE: ICD-10-CM

## 2022-07-08 RX ORDER — CARBAMAZEPINE 200 MG/1
TABLET ORAL
Qty: 180 TABLET | Refills: 1 | Status: SHIPPED | OUTPATIENT
Start: 2022-07-08

## 2022-07-13 ENCOUNTER — HOSPITAL ENCOUNTER (OUTPATIENT)
Dept: LAB | Age: 52
Discharge: HOME | End: 2022-07-13
Payer: COMMERCIAL

## 2022-07-13 ENCOUNTER — APPOINTMENT (OUTPATIENT)
Dept: FAMILY MEDICINE CLINIC | Age: 52
End: 2022-07-13

## 2022-07-13 DIAGNOSIS — E11.65 TYPE 2 DIABETES MELLITUS WITH HYPERGLYCEMIA, WITHOUT LONG-TERM CURRENT USE OF INSULIN (HCC): ICD-10-CM

## 2022-07-13 LAB
ALBUMIN SERPL-MCNC: 4 G/DL (ref 3.4–5)
ALBUMIN/GLOB SERPL: 1.2 {RATIO} (ref 0.8–1.7)
ALP SERPL-CCNC: 256 U/L (ref 45–117)
ALT SERPL-CCNC: 55 U/L (ref 13–56)
ANION GAP SERPL CALC-SCNC: 6 MMOL/L (ref 3–18)
AST SERPL-CCNC: 49 U/L (ref 10–38)
BILIRUB SERPL-MCNC: 0.3 MG/DL (ref 0.2–1)
BUN SERPL-MCNC: 16 MG/DL (ref 7–18)
BUN/CREAT SERPL: 20 (ref 12–20)
CALCIUM SERPL-MCNC: 9.1 MG/DL (ref 8.5–10.1)
CHLORIDE SERPL-SCNC: 103 MMOL/L (ref 100–111)
CHOLEST SERPL-MCNC: 185 MG/DL
CO2 SERPL-SCNC: 29 MMOL/L (ref 21–32)
CREAT SERPL-MCNC: 0.79 MG/DL (ref 0.6–1.3)
CREAT UR-MCNC: 147 MG/DL (ref 30–125)
EST. AVERAGE GLUCOSE BLD GHB EST-MCNC: 143 MG/DL
GLOBULIN SER CALC-MCNC: 3.3 G/DL (ref 2–4)
GLUCOSE SERPL-MCNC: 110 MG/DL (ref 74–99)
HBA1C MFR BLD: 6.6 % (ref 4.2–5.6)
HDLC SERPL-MCNC: 72 MG/DL (ref 40–60)
HDLC SERPL: 2.6 {RATIO} (ref 0–5)
LDLC SERPL CALC-MCNC: 99.4 MG/DL (ref 0–100)
LIPID PROFILE,FLP: ABNORMAL
MICROALBUMIN UR-MCNC: 2.4 MG/DL (ref 0–3)
MICROALBUMIN/CREAT UR-RTO: 16 MG/G (ref 0–30)
POTASSIUM SERPL-SCNC: 4.1 MMOL/L (ref 3.5–5.5)
PROT SERPL-MCNC: 7.3 G/DL (ref 6.4–8.2)
SODIUM SERPL-SCNC: 138 MMOL/L (ref 136–145)
TRIGL SERPL-MCNC: 68 MG/DL (ref ?–150)
VLDLC SERPL CALC-MCNC: 13.6 MG/DL

## 2022-07-13 PROCEDURE — 83036 HEMOGLOBIN GLYCOSYLATED A1C: CPT

## 2022-07-13 PROCEDURE — 36415 COLL VENOUS BLD VENIPUNCTURE: CPT

## 2022-07-13 PROCEDURE — 80061 LIPID PANEL: CPT

## 2022-07-13 PROCEDURE — 80053 COMPREHEN METABOLIC PANEL: CPT

## 2022-07-13 PROCEDURE — 82043 UR ALBUMIN QUANTITATIVE: CPT

## 2022-07-15 DIAGNOSIS — E83.42 HYPOMAGNESEMIA: ICD-10-CM

## 2022-07-15 DIAGNOSIS — G47.62 NOCTURNAL LEG CRAMPS: ICD-10-CM

## 2022-07-15 RX ORDER — LANOLIN ALCOHOL/MO/W.PET/CERES
CREAM (GRAM) TOPICAL
Qty: 90 TABLET | Refills: 1 | Status: SHIPPED | OUTPATIENT
Start: 2022-07-15

## 2022-07-20 ENCOUNTER — OFFICE VISIT (OUTPATIENT)
Dept: FAMILY MEDICINE CLINIC | Age: 52
End: 2022-07-20
Payer: COMMERCIAL

## 2022-07-20 VITALS
HEART RATE: 63 BPM | DIASTOLIC BLOOD PRESSURE: 83 MMHG | BODY MASS INDEX: 48.73 KG/M2 | WEIGHT: 275 LBS | TEMPERATURE: 98.1 F | OXYGEN SATURATION: 94 % | RESPIRATION RATE: 18 BRPM | HEIGHT: 63 IN | SYSTOLIC BLOOD PRESSURE: 125 MMHG

## 2022-07-20 DIAGNOSIS — K59.09 OTHER CONSTIPATION: ICD-10-CM

## 2022-07-20 DIAGNOSIS — R42 DIZZINESS: ICD-10-CM

## 2022-07-20 DIAGNOSIS — E11.65 TYPE 2 DIABETES MELLITUS WITH HYPERGLYCEMIA, WITHOUT LONG-TERM CURRENT USE OF INSULIN (HCC): Primary | ICD-10-CM

## 2022-07-20 LAB — HBA1C MFR BLD HPLC: 6.2 %

## 2022-07-20 PROCEDURE — 83036 HEMOGLOBIN GLYCOSYLATED A1C: CPT | Performed by: FAMILY MEDICINE

## 2022-07-20 PROCEDURE — 99214 OFFICE O/P EST MOD 30 MIN: CPT | Performed by: FAMILY MEDICINE

## 2022-07-20 PROCEDURE — 3044F HG A1C LEVEL LT 7.0%: CPT | Performed by: FAMILY MEDICINE

## 2022-07-20 RX ORDER — PHENOL/SODIUM PHENOLATE
20 AEROSOL, SPRAY (ML) MUCOUS MEMBRANE DAILY
COMMUNITY

## 2022-07-20 NOTE — PROGRESS NOTES
1. \"Have you been to the ER, urgent care clinic since your last visit? Hospitalized since your last visit? \" No    2. \"Have you seen or consulted any other health care providers outside of the [address] [place] [address] since your last visit? \" No     3. For patients aged 39-70: Has the patient had a colonoscopy / FIT/ Cologuard? Yes - no Care Gap present      If the patient is female:    4. For patients aged 41-77: Has the patient had a mammogram within the past 2 years? Yes - no Care Gap present      5. For patients aged 21-65: Has the patient had a pap smear?  No

## 2022-07-20 NOTE — PROGRESS NOTES
Note  HPI:  Delfino Stahl is a 47 y.o. female who presents today with   Chief Complaint   Patient presents with    Dizziness     F/u    Constipation     X 1 wk          Gets \" off balance\" off and on. But thinks this is getting better. Meclizine has been helpful    Has started prilosec.;  Acid reflux is moderately controlled. Has a BM about every 3-4 days;  feels uncomfortable in between time. Patient has not constipation. Patient has a history of diabetes. Her point-of-care A1c is as listed below:  Lab Results   Component Value Date/Time    Hemoglobin A1c 6.6 (H) 07/13/2022 08:26 AM    Hemoglobin A1c (POC) 6.2 07/20/2022 04:30 PM         She has been well otherwise. She has done her labs and is here to review the same. Labs are as listed below. Lab Results   Component Value Date/Time    Cholesterol, total 185 07/13/2022 08:26 AM    HDL Cholesterol 72 (H) 07/13/2022 08:26 AM    LDL, calculated 99.4 07/13/2022 08:26 AM    VLDL, calculated 13.6 07/13/2022 08:26 AM    Triglyceride 68 07/13/2022 08:26 AM    CHOL/HDL Ratio 2.6 07/13/2022 08:26 AM       Lab Results   Component Value Date/Time    Sodium 138 07/13/2022 08:26 AM    Potassium 4.1 07/13/2022 08:26 AM    Chloride 103 07/13/2022 08:26 AM    CO2 29 07/13/2022 08:26 AM    Anion gap 6 07/13/2022 08:26 AM    Glucose 110 (H) 07/13/2022 08:26 AM    BUN 16 07/13/2022 08:26 AM    Creatinine 0.79 07/13/2022 08:26 AM    BUN/Creatinine ratio 20 07/13/2022 08:26 AM    GFR est AA >60 07/13/2022 08:26 AM    GFR est non-AA >60 07/13/2022 08:26 AM    Calcium 9.1 07/13/2022 08:26 AM    Bilirubin, total 0.3 07/13/2022 08:26 AM    Alk.  phosphatase 256 (H) 07/13/2022 08:26 AM    Protein, total 7.3 07/13/2022 08:26 AM    Albumin 4.0 07/13/2022 08:26 AM    Globulin 3.3 07/13/2022 08:26 AM    A-G Ratio 1.2 07/13/2022 08:26 AM    ALT (SGPT) 55 07/13/2022 08:26 AM    AST (SGOT) 49 (H) 07/13/2022 08:26 AM     Lab Results   Component Value Date/Time    Hemoglobin A1c 6.6 (H) 07/13/2022 08:26 AM    Hemoglobin A1c (POC) 6.2 07/20/2022 04:30 PM         She has had some depression as noted by her PHQ-9. She does not want to be on any new medications to treat this. She states she is managing this well. She has no suicidal ideation. Source of her depression is situational.  She is on Cymbalta. 3 most recent PHQ Screens 7/20/2022   PHQ Not Done Patient refuses   Little interest or pleasure in doing things More than half the days   Feeling down, depressed, irritable, or hopeless More than half the days   Total Score PHQ 2 4   Trouble falling or staying asleep, or sleeping too much More than half the days   Feeling tired or having little energy Nearly every day   Poor appetite, weight loss, or overeating Several days   Feeling bad about yourself - or that you are a failure or have let yourself or your family down Nearly every day   Trouble concentrating on things such as school, work, reading, or watching TV Several days   Moving or speaking so slowly that other people could have noticed; or the opposite being so fidgety that others notice Several days   Thoughts of being better off dead, or hurting yourself in some way Not at all   PHQ 9 Score 15   How difficult have these problems made it for you to do your work, take care of your home and get along with others Very difficult               PMH,  Meds, Allergies, Family History, Social history reviewed      Current Outpatient Medications   Medication Sig Dispense Refill    Omeprazole delayed release (PRILOSEC D/R) 20 mg tablet Take 20 mg by mouth in the morning.      magnesium oxide (MAG-OX) 400 mg tablet TAKE 1 TABLET BY MOUTH EVERY DAY 90 Tablet 1    carBAMazepine (TEGretol) 200 mg tablet TAKE 1 TABLET BY MOUTH TWICE A  Tablet 1    aspirin delayed-release 81 mg tablet TAKE 1 TABLET BY MOUTH EVERY DAY 90 Tablet 1    atorvastatin (LIPITOR) 20 mg tablet Take 1 Tablet by mouth daily.  90 Tablet 3    losartan (COZAAR) 25 mg tablet Take 1 Tablet by mouth daily. 90 Tablet 3    metoprolol tartrate (LOPRESSOR) 25 mg tablet TAKE 1/2 TABLET BY MOUTH TWICE A DAY 90 Tablet 2    nitroglycerin (NITROSTAT) 0.4 mg SL tablet Dissolve one tablet under the tongue at the start of chest pain every 5 minutes up to 3 doses. NOTIFY MD IF > 3 TABS NEEDED. 25 Tablet 0    gabapentin (NEURONTIN) 300 mg capsule 2 caps QAM, 1 cap at noon, 1 cap at dinner, and 2 caps at HS (Patient taking differently: three (3) times daily. 2 caps QAM, 1 cap at noon, 1 cap at dinner, and 2 caps at HS) 1 Cap 0    lidocaine (LIDODERM) 5 % APPLY 1 NEW PATCH TO SKIN FOR LEFT DORSAL PAIN UP TO 3 PATCHES EACH TIME Q 12 HOURS PRN. DULoxetine (CYMBALTA) 60 mg capsule Take 1 Cap by mouth daily. 90 Cap 1    multivitamin (ONE A DAY) tablet Take 1 Tab by mouth daily. 30 Tab 11    omega-3 acid ethyl esters (LOVAZA) 1 gram capsule Take 1 Cap by mouth nightly. 90 Cap 3        Allergies   Allergen Reactions    Contrave [Naltrexone-Bupropion] Other (comments)     Severe constipation    Metformin Nausea Only                  ROS as per HPI      Visit Vitals  /83 (BP 1 Location: Left upper arm, BP Patient Position: Sitting, BP Cuff Size: Adult long)   Pulse 63   Temp 98.1 °F (36.7 °C) (Temporal)   Resp 18   Ht 5' 3\" (1.6 m)   Wt 275 lb (124.7 kg)   SpO2 94%   BMI 48.71 kg/m²     Physical Exam    General appearance: alert, cooperative, no distress, appears stated age  Neck: supple, symmetrical, trachea midline, no adenopathy, thyroid: not enlarged, symmetric, no tenderness/mass/nodules, no carotid bruit and no JVD  Lungs: clear to auscultation bilaterally  Heart: regular rate and rhythm, S1, S2 normal, no murmur, click, rub or gallop    Extremities: extremities normal, atraumatic, no cyanosis or edema  Neurological exam reveals alert, oriented, normal speech, no focal findings or movement disorder noted, cranial nerves II through XII intact.  (Hypoglossal nerve not tested)        Assessment/Plan:  Diagnoses and all orders for this visit:    1. Type 2 diabetes mellitus with hyperglycemia, without long-term current use of insulin (HCC)  -     AMB POC HEMOGLOBIN A1C    2. Dizziness    3. Other constipation        As above  Patient's diabetes is stable  The dizziness is improving with meclizine, will continue to monitor  For the constipation. Patient has been given over-the-counter resources to try. She may consider Colace, Lurdes-Colace, or Dulcolax; colon cancer screening is up-to-date through Colog[facility]. Patient declines GI referral for colonoscopy at this time. Continue other current medications  Follow-up and Dispositions    Return in about 4 months (around 11/20/2022) for diabetes, htn. This has been fully explained to the patient, who indicates understanding. An After Visit Summary was printed and given to the patient. Labs ordered in anticipation of being drawn prior to the next visit.                 Chanda Doyle MD

## 2022-07-26 ENCOUNTER — OFFICE VISIT (OUTPATIENT)
Dept: CARDIOLOGY CLINIC | Age: 52
End: 2022-07-26
Payer: COMMERCIAL

## 2022-07-26 VITALS — HEIGHT: 63 IN | BODY MASS INDEX: 48.71 KG/M2

## 2022-07-26 DIAGNOSIS — I25.10 CORONARY ARTERY DISEASE INVOLVING NATIVE CORONARY ARTERY OF NATIVE HEART WITHOUT ANGINA PECTORIS: Primary | ICD-10-CM

## 2022-07-26 DIAGNOSIS — I24.9 ACS (ACUTE CORONARY SYNDROME) (HCC): ICD-10-CM

## 2022-07-26 PROCEDURE — 99214 OFFICE O/P EST MOD 30 MIN: CPT | Performed by: INTERNAL MEDICINE

## 2022-07-26 PROCEDURE — 93000 ELECTROCARDIOGRAM COMPLETE: CPT | Performed by: INTERNAL MEDICINE

## 2022-07-26 NOTE — PROGRESS NOTES
HISTORY OF PRESENT ILLNESS  Damian Rehman is a 47 y.o. female. ASSESSMENT and PLAN    Ms. Jomar Rosen has history of CAD. She presented back in December 2011 with atypical chest pains but abnormal EKG, and abnormal nuclear scan. Her coronary angiogram revealed ostial 90% LAD stenosis; FFR was performed with intrinsic value of 0.87 and adenosine induced 0.66. She subsequently underwent single vessel LIMA to LAD bypass surgery. Since her bypass surgery, she presented twice with chest pains to the emergency room which were again quite atypical. She had nuclear scans done both instances which were normal.  [facility] Nuclear scan showed ejection fraction of 60% but apical filling defect. Ultimately, repeat coronary angiography was performed. It was noted that her HECTOR to LAD was widely patent. Again, in March 2020, she presented with chest pains (shoulder and jaw pain), and abnormal nuclear scan involving the basal anterior wall, with decline in EF to 40%. She underwent coronary angiography which revealed widely patent LIMA to LAD with ostial LAD moderate stenosis. Continue medical therapy was recommended. CAD:   Clinically stable. Her episodes of right upper chest discomfort not related to physical exertion is quite atypical and unlikely from coronary ischemia. BP:   Well controlled at 110/72. Rhythm:   Stable sinus at 60 bpm.  CHF:    There is no evidence of decompensated CHF noted. Weight:    Her weight today is 271 pounds. Unfortunately, she has not been able lose any weight. Her target weight should be around 200 pounds. Again, this was discussed at length with the patient. All questions were answered. I advised her to try to lose anywhere from 6-10 pounds every 6 months. Cholesterol:   Target LDL <70. Crestor 40. Anti-platelet:   Remains on ASA. I will see her back in 6 months. Thank you.     Encounter Diagnoses Name Primary?  Coronary artery disease involving native coronary artery of native heart without angina pectoris Yes    ACS (acute coronary syndrome) ([facility] [address]      current treatment plan is effective, no change in therapy  lab results and schedule of future lab studies reviewed with patient  reviewed diet, exercise and weight control  cardiovascular risk and specific lipid/LDL goals reviewed  use of aspirin to prevent MI and TIA's discussed    Follow-up  Associated symptoms include chest pain. Pertinent negatives include no shortness of breath. Today, Ms. Jayla Grider has no complaints of exertional chest pains. She has atypical right upper chest discomfort not associated with physical exertion. Sometimes she gets these discomforts after eating. There is no associated shortness of breath. She denies any worsening WELLS. She denies any changes in her exercise capacity. She denies any orthopnea or PND. She denies any palpitations or dizziness. Review of Systems   Respiratory:  Negative for shortness of breath. Cardiovascular:  Positive for chest pain. Negative for palpitations, orthopnea, claudication, leg swelling and PND. All other systems reviewed and are negative. Physical Exam  Vitals and nursing note reviewed. Constitutional:       Appearance: She is obese. HENT:      Head: Normocephalic. Eyes:      Conjunctiva/sclera: Conjunctivae normal.   Neck:      Vascular: No carotid bruit. Cardiovascular:      Rate and Rhythm: Normal rate and regular rhythm. Pulmonary:      Breath sounds: Normal breath sounds. Abdominal:      Palpations: Abdomen is soft. Musculoskeletal:         General: No swelling. Cervical back: No rigidity. Skin:     General: Skin is warm and dry. Neurological:      General: No focal deficit present. Mental Status: She is alert and oriented to person, place, and time.    Psychiatric:         Mood and Affect: Mood normal.         Behavior: Behavior normal. PCP: Chacha Boo MD    Past Medical History:   Diagnosis Date    Abnormal PFT 10/1/2015    Dr. Jona Schroeder, Pulmonology    Acid reflux     CABG x 1 2011    LIMA-LAD    CAD (coronary artery disease)     Cardiac cath 2016    R dom. oLAD 85%. Otherwise patent coronaries. HECTOR to LAD patent. LVEDP 22 mmHg. EF 55%. No RWMA.  Cardiac echocardiogram 2015    Tech difficult. EF 50-55%. No WMA. Mild conc LVH. Gr 2 DDfx. RVSP 45-50 mmHg. Mild LAE. Mild ROSITA. Mod TR. Mild IVCE.  Cardiac nuclear imaging test, high risk 2016    High risk. Mainly fixed anteroapical defect w/partial reversibility. EF 60%. No RWMA. Nondiagnostic EKG on pharm stress test.    Cardiovascular upper extremity arterial duplex 2014    No significant occlusive arterial disease bilaterally.  Coronary atherosclerosis of native coronary artery     Diabetes mellitus ([address] 14    hgbA1C 6.8    Diabetic eye exam ([address]     Dyslipidemia     GERD (gastroesophageal reflux disease)     H/O screening mammography 2016    no evidence of malignancy    Hypercholesterolemia     Hypomagnesemia 14    1.4    Pleural effusion, left     s/p CABG and thoracentesis with removal of 900 mL    Pulmonary arterial hypertension ([address]        Past Surgical History:   Procedure Laterality Date    CARDIAC CATHETERIZATION  2016         CORONARY ARTERY ANGIOGRAM  2016         HX  SECTION      HX CORONARY ARTERY BYPASS GRAFT  12/31/2011    x1    LV ANGIOGRAPHY  2016         MS CARDIAC SURG PROCEDURE UNLIST         Current Outpatient Medications   Medication Sig Dispense Refill    Omeprazole delayed release (PRILOSEC D/R) 20 mg tablet Take 20 mg by mouth in the morning.       magnesium oxide (MAG-OX) 400 mg tablet TAKE 1 TABLET BY MOUTH EVERY DAY 90 Tablet 1    carBAMazepine (TEGretol) 200 mg tablet TAKE 1 TABLET BY MOUTH TWICE A  Tablet 1    aspirin delayed-release 81 mg tablet TAKE 1 TABLET BY MOUTH EVERY DAY 90 Tablet 1    atorvastatin (LIPITOR) 20 mg tablet Take 1 Tablet by mouth daily. 90 Tablet 3    losartan (COZAAR) 25 mg tablet Take 1 Tablet by mouth daily. 90 Tablet 3    metoprolol tartrate (LOPRESSOR) 25 mg tablet TAKE 1/2 TABLET BY MOUTH TWICE A DAY 90 Tablet 2    nitroglycerin (NITROSTAT) 0.4 mg SL tablet Dissolve one tablet under the tongue at the start of chest pain every 5 minutes up to 3 doses. NOTIFY MD IF > 3 TABS NEEDED. 25 Tablet 0    gabapentin (NEURONTIN) 300 mg capsule 2 caps QAM, 1 cap at noon, 1 cap at dinner, and 2 caps at HS (Patient taking differently: three (3) times daily. 2 caps QAM, 1 cap at noon, 1 cap at dinner, and 2 caps at HS) 1 Cap 0    lidocaine (LIDODERM) 5 % APPLY 1 NEW PATCH TO SKIN FOR LEFT DORSAL PAIN UP TO 3 PATCHES EACH TIME Q 12 HOURS PRN.  DULoxetine (CYMBALTA) 60 mg capsule Take 1 Cap by mouth daily. 90 Cap 1    multivitamin (ONE A DAY) tablet Take 1 Tab by mouth daily. 30 Tab 11    omega-3 acid ethyl esters (LOVAZA) 1 gram capsule Take 1 Cap by mouth nightly.  80 Cap 3       The patient has a family history of    Social History     Tobacco Use    Smoking status: Never    Smokeless tobacco: Never    Tobacco comments:     2nd home smoking from mother during 1st 17 yrs of life   Vaping Use    Vaping Use: Never used   Substance Use Topics    Alcohol use: No     Alcohol/week: 1.0 standard drink     Types: 1 Standard drinks or equivalent per week    Drug use: No       Lab Results   Component Value Date/Time    Cholesterol, total 185 07/13/2022 08:26 AM    HDL Cholesterol 72 (H) 07/13/2022 08:26 AM    LDL, calculated 99.4 07/13/2022 08:26 AM    Triglyceride 68 07/13/2022 08:26 AM    CHOL/HDL Ratio 2.6 07/13/2022 08:26 AM        BP Readings from Last 3 Encounters:   07/20/22 125/83   05/25/22 122/80   03/02/22 94/67        Pulse Readings from Last 3 Encounters:   07/20/22 63   05/25/22 60 03/02/22 60       Wt Readings from Last 3 Encounters:   07/20/22 124.7 kg (275 lb)   05/25/22 123 kg (271 lb 3.2 oz)   03/02/22 121.8 kg (268 lb 9.6 oz)         EKG: unchanged from previous tracings, normal sinus rhythm, nonspecific ST and T waves changes.

## 2022-10-06 DIAGNOSIS — G50.0 TRIGEMINAL NEURALGIA OF LEFT SIDE OF FACE: ICD-10-CM

## 2022-10-07 RX ORDER — ASPIRIN 81 MG/1
81 TABLET ORAL DAILY
Qty: 90 TABLET | Refills: 1 | OUTPATIENT
Start: 2022-10-07

## 2022-10-07 RX ORDER — ASPIRIN 81 MG/1
TABLET ORAL
Qty: 90 TABLET | Refills: 1 | Status: SHIPPED | OUTPATIENT
Start: 2022-10-07

## 2022-10-07 RX ORDER — CARBAMAZEPINE 200 MG/1
200 TABLET ORAL 2 TIMES DAILY
Qty: 180 TABLET | Refills: 1 | OUTPATIENT
Start: 2022-10-07

## 2022-10-07 NOTE — TELEPHONE ENCOUNTER
Tegretol was sent on 7/8/22 for #180 with 1 refill  ASA was sent to the pharmacy today      For Pharmacy Admin Tracking Only    CPA in place:   Recommendation Provided To:    Intervention Detail: Discontinued Rx: 1, reason: Duplicate Therapy and New Rx: 1, reason: Patient Preference  Gap Closed?:   Intervention Accepted By:   Time Spent (min): 5

## 2022-12-14 RX ORDER — METOPROLOL TARTRATE 25 MG/1
12.5 TABLET, FILM COATED ORAL 2 TIMES DAILY
Qty: 90 TABLET | Refills: 2 | Status: SHIPPED | OUTPATIENT
Start: 2022-12-14

## 2022-12-15 ENCOUNTER — OFFICE VISIT (OUTPATIENT)
Dept: FAMILY MEDICINE CLINIC | Age: 52
End: 2022-12-15
Payer: COMMERCIAL

## 2022-12-15 VITALS
HEIGHT: 63 IN | TEMPERATURE: 97.3 F | RESPIRATION RATE: 16 BRPM | OXYGEN SATURATION: 95 % | HEART RATE: 55 BPM | DIASTOLIC BLOOD PRESSURE: 79 MMHG | SYSTOLIC BLOOD PRESSURE: 138 MMHG | BODY MASS INDEX: 48.02 KG/M2 | WEIGHT: 271 LBS

## 2022-12-15 DIAGNOSIS — K59.00 CONSTIPATION, UNSPECIFIED CONSTIPATION TYPE: Primary | ICD-10-CM

## 2022-12-15 PROCEDURE — 3078F DIAST BP <80 MM HG: CPT | Performed by: STUDENT IN AN ORGANIZED HEALTH CARE EDUCATION/TRAINING PROGRAM

## 2022-12-15 PROCEDURE — 3074F SYST BP LT 130 MM HG: CPT | Performed by: STUDENT IN AN ORGANIZED HEALTH CARE EDUCATION/TRAINING PROGRAM

## 2022-12-15 PROCEDURE — 99213 OFFICE O/P EST LOW 20 MIN: CPT | Performed by: STUDENT IN AN ORGANIZED HEALTH CARE EDUCATION/TRAINING PROGRAM

## 2022-12-15 RX ORDER — MOMETASONE FUROATE 50 UG/1
SPRAY, METERED NASAL
COMMUNITY
Start: 2022-11-17

## 2022-12-15 RX ORDER — POLYETHYLENE GLYCOL 3350 17 G/17G
17 POWDER, FOR SOLUTION ORAL DAILY
Qty: 30 PACKET | Refills: 0 | Status: SHIPPED | OUTPATIENT
Start: 2022-12-15 | End: 2023-01-14

## 2022-12-15 NOTE — PROGRESS NOTES
HISTORY OF PRESENT ILLNESS  Luh Pierre is a 47 y.o. female. Pleasant 23-year-old female presenting today in the company of her  for evaluation with complaints of abdominal pain (poorly localized) off and on x1 month. Abdominal pain is said to be crampy, 4-5/10, no known relieving aggravating factors. Patient endorses bloating and constipation (last bowel movement was about 6 days ago). Patient denies any subjective fever, unintentional weight loss, nausea, vomiting or diarrhea    Abdominal Pain  The history is provided by the Patient. This is a new problem. The current episode started more than 1 week ago. The problem occurs every several days. The problem has not changed since onset. Associated symptoms include abdominal pain. Pertinent negatives include no chest pain, no headaches and no shortness of breath. The symptoms are aggravated by eating. The symptoms are relieved by position. Review of Systems   Constitutional: Negative. HENT: Negative. Respiratory: Negative. Negative for shortness of breath. Cardiovascular: Negative. Negative for chest pain. Gastrointestinal:  Positive for abdominal pain and constipation. Negative for blood in stool, diarrhea, nausea and vomiting. Musculoskeletal: Negative. Skin: Negative. Neurological:  Negative for headaches. /79 (BP 1 Location: Left upper arm, BP Patient Position: Sitting, BP Cuff Size: Large adult)   Pulse (!) 55   Temp 97.3 °F (36.3 °C) (Temporal)   Resp 16   Ht 5' 3\" (1.6 m)   Wt 271 lb (122.9 kg)   SpO2 95%   BMI 48.01 kg/m²     Physical Exam  Constitutional:       General: She is not in acute distress. Appearance: Normal appearance. She is obese. She is not ill-appearing, toxic-appearing or diaphoretic. HENT:      Head: Normocephalic and atraumatic. Right Ear: External ear normal.      Left Ear: External ear normal.      Nose: Nose normal. No congestion or rhinorrhea.       Mouth/Throat: Mouth: Mucous membranes are moist.      Pharynx: No oropharyngeal exudate or posterior oropharyngeal erythema. Cardiovascular:      Rate and Rhythm: Normal rate and regular rhythm. Pulmonary:      Effort: Pulmonary effort is normal.      Breath sounds: Normal breath sounds. No wheezing, rhonchi or rales. Abdominal:      General: Abdomen is flat. Bowel sounds are normal. There is no distension. Palpations: Abdomen is soft. There is no mass. Tenderness: There is no abdominal tenderness. Neurological:      Mental Status: She is alert. ASSESSMENT and PLAN  Diagnoses and all orders for this visit:    1. Constipation, unspecified constipation type  Comments:  Patient counseled on dietary adjustments such as increasing fruits, vegetables (fiber content), additional adequate hydration. Will prescribe MiraLAX. Orders:  -     polyethylene glycol (MIRALAX) 17 gram packet; Take 1 Packet by mouth daily for 30 days. Follow-up and Dispositions    Return if symptoms worsen or fail to improve.

## 2022-12-15 NOTE — PROGRESS NOTES
1. \"Have you been to the ER, urgent care clinic since your last visit? Hospitalized since your last visit? \" No    2. \"Have you seen or consulted any other health care providers outside of the [address] [place] [address] since your last visit? \" No     3. For patients aged 39-70: Has the patient had a colonoscopy / FIT/ Cologuard? Yes - Care Gap present. Most recent result on file      If the patient is female:    4. For patients aged 41-77: Has the patient had a mammogram within the past 2 years? Yes - Care Gap present. Most recent result on file      5. For patients aged 21-65: Has the patient had a pap smear?  No

## 2022-12-18 DIAGNOSIS — G50.0 TRIGEMINAL NEURALGIA OF LEFT SIDE OF FACE: ICD-10-CM

## 2022-12-19 RX ORDER — OLMESARTAN MEDOXOMIL AND HYDROCHLOROTHIAZIDE 20/12.5 20; 12.5 MG/1; MG/1
TABLET ORAL
Qty: 90 TABLET | Refills: 3 | OUTPATIENT
Start: 2022-12-19

## 2022-12-19 NOTE — TELEPHONE ENCOUNTER
Last Appointment- 7/20/22    Next Appointment- 2/2/23    - None    Urine Drug Screen- None    Controlled Substance Agreement- None     Requested Prescriptions     Pending Prescriptions Disp Refills    losartan (COZAAR) 25 mg tablet 90 Tablet 3     Sig: Take 1 Tablet by mouth daily.

## 2022-12-23 RX ORDER — CARBAMAZEPINE 200 MG/1
TABLET ORAL
Qty: 180 TABLET | Refills: 1 | Status: SHIPPED | OUTPATIENT
Start: 2022-12-23

## 2022-12-23 RX ORDER — ATORVASTATIN CALCIUM 20 MG/1
TABLET, FILM COATED ORAL
Qty: 90 TABLET | Refills: 3 | Status: SHIPPED | OUTPATIENT
Start: 2022-12-23

## 2022-12-23 RX ORDER — LOSARTAN POTASSIUM 25 MG/1
25 TABLET ORAL DAILY
Qty: 90 TABLET | Refills: 3 | Status: SHIPPED | OUTPATIENT
Start: 2022-12-23

## 2022-12-23 RX ORDER — LOSARTAN POTASSIUM 25 MG/1
TABLET ORAL
Qty: 90 TABLET | Refills: 3 | Status: SHIPPED | OUTPATIENT
Start: 2022-12-23

## 2023-01-03 ENCOUNTER — OFFICE VISIT (OUTPATIENT)
Dept: CARDIOLOGY CLINIC | Age: 53
End: 2023-01-03
Payer: COMMERCIAL

## 2023-01-03 VITALS
WEIGHT: 267 LBS | DIASTOLIC BLOOD PRESSURE: 80 MMHG | HEIGHT: 63 IN | OXYGEN SATURATION: 95 % | BODY MASS INDEX: 47.31 KG/M2 | HEART RATE: 53 BPM | SYSTOLIC BLOOD PRESSURE: 134 MMHG

## 2023-01-03 DIAGNOSIS — I25.10 CORONARY ARTERY DISEASE INVOLVING NATIVE CORONARY ARTERY OF NATIVE HEART WITHOUT ANGINA PECTORIS: Primary | ICD-10-CM

## 2023-01-03 DIAGNOSIS — R07.9 CHEST PAIN, UNSPECIFIED TYPE: ICD-10-CM

## 2023-01-03 DIAGNOSIS — I24.9 ACS (ACUTE CORONARY SYNDROME) (HCC): ICD-10-CM

## 2023-01-03 PROCEDURE — 3075F SYST BP GE 130 - 139MM HG: CPT | Performed by: INTERNAL MEDICINE

## 2023-01-03 PROCEDURE — 93000 ELECTROCARDIOGRAM COMPLETE: CPT | Performed by: INTERNAL MEDICINE

## 2023-01-03 PROCEDURE — 99214 OFFICE O/P EST MOD 30 MIN: CPT | Performed by: INTERNAL MEDICINE

## 2023-01-03 PROCEDURE — 3079F DIAST BP 80-89 MM HG: CPT | Performed by: INTERNAL MEDICINE

## 2023-01-03 RX ORDER — GABAPENTIN 800 MG/1
800 TABLET ORAL 3 TIMES DAILY
COMMUNITY

## 2023-01-03 RX ORDER — OLMESARTAN MEDOXOMIL AND HYDROCHLOROTHIAZIDE 20/12.5 20; 12.5 MG/1; MG/1
1 TABLET ORAL DAILY
COMMUNITY
Start: 2022-10-05

## 2023-01-03 NOTE — PROGRESS NOTES
HISTORY OF PRESENT ILLNESS  Delfino Stahl is a 47 y.o. female. ASSESSMENT and PLAN    Ms. Jason Blount has history of CAD. She presented back in December 2011 with atypical chest pains but abnormal EKG, and abnormal nuclear scan. Her coronary angiogram revealed ostial 90% LAD stenosis; FFR was performed with intrinsic value of 0.87 and adenosine induced 0.66. She subsequently underwent single vessel LIMA to LAD bypass surgery. Since her bypass surgery, she presented twice with chest pains to the emergency room which were again quite atypical. She had nuclear scans done both instances which were normal.  Back in November 2016, she presented to [facility] with chest pain. Nuclear scan showed ejection fraction of 60% but apical filling defect. Ultimately, repeat coronary angiography was performed. It was noted that her HECTOR to LAD was widely patent. Again, in March 2020, she presented with chest pains (shoulder and jaw pain), and abnormal nuclear scan involving the basal anterior wall, with decline in EF to 40%. She underwent coronary angiography which revealed widely patent LIMA to LAD with ostial LAD moderate stenosis. Continue medical therapy was recommended. CAD:    Symptomatically stable. BP:    Well controlled at 134/80. Rhythm:    Asymptomatic sinus bradycardia 56 bpm.  CHF:    There is no evidence of decompensated CHF noted. Weight:     Her weight today is 267 pounds. Over the last 5 years, she has gained significant amount of weight. Unfortunately, she has not been able lose any significant weight. Again, strong encouragement was given for weight control. I did discuss with her about possible new medication options. She will discuss these with her PCP. Cholesterol:   Target LDL <70. Crestor 40. Anti-platelet:   Remains on ASA. I will see her back in 6 months. Thank you. Encounter Diagnoses   Name Primary?     Coronary artery disease involving native coronary artery of native heart without angina pectoris Yes    ACS (acute coronary syndrome) ([address]     Chest pain, unspecified type      current treatment plan is effective, no change in therapy  lab results and schedule of future lab studies reviewed with patient  reviewed diet, exercise and weight control  cardiovascular risk and specific lipid/LDL goals reviewed  use of aspirin to prevent MI and TIA's discussed    Follow-up  Associated symptoms include shortness of breath. Pertinent negatives include no chest pain. Today, Ms. Jennie Verma has no complaints of chest pains, increased dyspnea on exertion, or decreased exercise capacity. She has baseline WELLS without recent change. She denies any changes in her activities. She is limited by dyspnea when she does physical activities. She has not been able to lose significant weight. She denies any orthopnea or PND. She denies any palpitations or dizziness. Review of Systems   Respiratory:  Positive for shortness of breath. Cardiovascular:  Negative for chest pain, palpitations, orthopnea, claudication, leg swelling and PND. All other systems reviewed and are negative. Physical Exam  Vitals and nursing note reviewed. Constitutional:       Appearance: She is obese. HENT:      Head: Normocephalic. Eyes:      Conjunctiva/sclera: Conjunctivae normal.   Neck:      Vascular: No carotid bruit. Cardiovascular:      Rate and Rhythm: Regular rhythm. Bradycardia present. Pulmonary:      Breath sounds: Normal breath sounds. Abdominal:      Palpations: Abdomen is soft. Musculoskeletal:         General: No swelling. Cervical back: No rigidity. Skin:     General: Skin is warm and dry. Neurological:      General: No focal deficit present. Mental Status: She is alert and oriented to person, place, and time.    Psychiatric:         Mood and Affect: Mood normal.         Behavior: Behavior normal.       PCP: Taye Omalley MD    Past Medical History: Diagnosis Date    Abnormal PFT 10/1/2015    Dr. Mady Schroeder, Pulmonology    Acid reflux     CABG x 1 2011    LIMA-LAD    CAD (coronary artery disease)     Cardiac cath 2016    R dom. oLAD 85%. Otherwise patent coronaries. HECTOR to LAD patent. LVEDP 22 mmHg. EF 55%. No RWMA.  Cardiac echocardiogram 2015    Tech difficult. EF 50-55%. No WMA. Mild conc LVH. Gr 2 DDfx. RVSP 45-50 mmHg. Mild LAE. Mild ROSITA. Mod TR. Mild IVCE.  Cardiac nuclear imaging test, high risk 2016    High risk. Mainly fixed anteroapical defect w/partial reversibility. EF 60%. No RWMA. Nondiagnostic EKG on pharm stress test.    Cardiovascular upper extremity arterial duplex 2014    No significant occlusive arterial disease bilaterally.  Coronary atherosclerosis of native coronary artery     Diabetes mellitus ([address] 14    hgbA1C 6.8    Diabetic eye exam ([address]     Dyslipidemia     GERD (gastroesophageal reflux disease)     H/O screening mammography 2016    no evidence of malignancy    Hypercholesterolemia     Hypomagnesemia 14    1.4    Pleural effusion, left     s/p CABG and thoracentesis with removal of 900 mL    Pulmonary arterial hypertension ([address]        Past Surgical History:   Procedure Laterality Date    CARDIAC CATHETERIZATION  2016         CORONARY ARTERY ANGIOGRAM  2016         HX  SECTION      HX CORONARY ARTERY BYPASS GRAFT  12/31/2011    x1    LV ANGIOGRAPHY  2016         AR UNLISTED PROCEDURE CARDIAC SURGERY         Current Outpatient Medications   Medication Sig Dispense Refill    olmesartan-hydroCHLOROthiazide (BENICAR HCT) 20-12.5 mg per tablet Take 1 Tablet by mouth daily.  gabapentin (NEURONTIN) 800 mg tablet Take 800 mg by mouth three (3) times daily.       losartan (COZAAR) 25 mg tablet TAKE 1 TABLET BY MOUTH EVERY DAY 90 Tablet 3    atorvastatin (LIPITOR) 20 mg tablet TAKE 1 TABLET BY MOUTH EVERY DAY 90 Tablet 3    carBAMazepine (TEGretol) 200 mg tablet TAKE 1 TABLET BY MOUTH TWICE A  Tablet 1    mometasone (NASONEX) 50 mcg/actuation nasal spray USE 2 SPRAYS EACH NOSTRIL ONCE A DAY 1SPRAYS EACH NOSTRIL ONCE A DAY      polyethylene glycol (MIRALAX) 17 gram packet Take 1 Packet by mouth daily for 30 days. 30 Packet 0    metoprolol tartrate (LOPRESSOR) 25 mg tablet Take 0.5 Tablets by mouth two (2) times a day. 90 Tablet 2    aspirin delayed-release 81 mg tablet TAKE 1 TABLET BY MOUTH EVERY DAY 90 Tablet 1    Omeprazole delayed release (PRILOSEC D/R) 20 mg tablet Take 20 mg by mouth in the morning.  magnesium oxide (MAG-OX) 400 mg tablet TAKE 1 TABLET BY MOUTH EVERY DAY 90 Tablet 1    nitroglycerin (NITROSTAT) 0.4 mg SL tablet Dissolve one tablet under the tongue at the start of chest pain every 5 minutes up to 3 doses. NOTIFY MD IF > 3 TABS NEEDED. 25 Tablet 0    lidocaine (LIDODERM) 5 % APPLY 1 NEW PATCH TO SKIN FOR LEFT DORSAL PAIN UP TO 3 PATCHES EACH TIME Q 12 HOURS PRN.  DULoxetine (CYMBALTA) 60 mg capsule Take 1 Cap by mouth daily. 90 Cap 1    multivitamin (ONE A DAY) tablet Take 1 Tab by mouth daily. 30 Tab 11    omega-3 acid ethyl esters (LOVAZA) 1 gram capsule Take 1 Cap by mouth nightly.  80 Cap 3       The patient has a family history of    Social History     Tobacco Use    Smoking status: Never    Smokeless tobacco: Never    Tobacco comments:     2nd home smoking from mother during 1st 17 yrs of life   Vaping Use    Vaping Use: Never used   Substance Use Topics    Alcohol use: No     Alcohol/week: 1.0 standard drink     Types: 1 Standard drinks or equivalent per week    Drug use: No       Lab Results   Component Value Date/Time    Cholesterol, total 185 07/13/2022 08:26 AM    HDL Cholesterol 72 (H) 07/13/2022 08:26 AM    LDL, calculated 99.4 07/13/2022 08:26 AM    Triglyceride 68 07/13/2022 08:26 AM    CHOL/HDL Ratio 2.6 07/13/2022 08:26 AM        BP Readings from Last 3 Encounters:   01/03/23 134/80   12/15/22 138/79   07/20/22 125/83        Pulse Readings from Last 3 Encounters:   01/03/23 (!) 53   12/15/22 (!) 55   07/20/22 63       Wt Readings from Last 3 Encounters:   01/03/23 121.1 kg (267 lb)   12/15/22 122.9 kg (271 lb)   07/20/22 124.7 kg (275 lb)         EKG: unchanged from previous tracings, nonspecific ST and T waves changes, sinus bradycardia.

## 2023-01-03 NOTE — PROGRESS NOTES
Brigida Guerra presents today for   Chief Complaint   Patient presents with    Follow-up     6 month follow up- no complaints            Brigida Guerra preferred language for health care discussion is english/other. Is someone accompanying this pt? no    Is the patient using any DME equipment during [address]? no    Depression Screening:  3 most recent PHQ Screens 12/15/2022   PHQ Not Done -   Little interest or pleasure in doing things Not at all   Feeling down, depressed, irritable, or hopeless Not at all   Total Score PHQ 2 0   Trouble falling or staying asleep, or sleeping too much -   Feeling tired or having little energy -   Poor appetite, weight loss, or overeating -   Feeling bad about yourself - or that you are a failure or have let yourself or your family down -   Trouble concentrating on things such as school, work, reading, or watching TV -   Moving or speaking so slowly that other people could have noticed; or the opposite being so fidgety that others notice -   Thoughts of being better off dead, or hurting yourself in some way -   PHQ 9 Score -   How difficult have these problems made it for you to do your work, take care of your home and get along with others -       Learning Assessment:  Learning Assessment 3/17/2017   PRIMARY LEARNER Patient   HIGHEST LEVEL OF EDUCATION - PRIMARY LEARNER  -   BARRIERS PRIMARY LEARNER -   CO-LEARNER CAREGIVER -   PRIMARY LANGUAGE ENGLISH   LEARNER PREFERENCE PRIMARY DEMONSTRATION   ANSWERED BY patient   RELATIONSHIP SELF       Abuse Screening:  Abuse Screening Questionnaire 12/15/2022   Do you ever feel afraid of your partner? N   Are you in a relationship with someone who physically or mentally threatens you? N   Is it safe for you to go home? Y       Fall Risk  Fall Risk Assessment, last 12 mths 7/20/2022   Able to walk? Yes   Fall in past 12 months? 0   Do you feel unsteady? 0   Are you worried about falling 0       Pt currently taking Anticoagulant therapy? no    Coordination of Care:  1. Have you been to the ER, urgent care clinic since your last visit? Hospitalized since your last visit? no    2. Have you seen or consulted any other health care providers outside of the [address] [place] [address] since your last visit? Include any pap smears or colon screening.  no

## 2023-01-05 ENCOUNTER — OFFICE VISIT (OUTPATIENT)
Dept: FAMILY MEDICINE CLINIC | Age: 53
End: 2023-01-05
Payer: COMMERCIAL

## 2023-01-05 ENCOUNTER — HOSPITAL ENCOUNTER (OUTPATIENT)
Dept: LAB | Age: 53
Discharge: HOME | End: 2023-01-05
Payer: COMMERCIAL

## 2023-01-05 VITALS
WEIGHT: 269 LBS | RESPIRATION RATE: 16 BRPM | SYSTOLIC BLOOD PRESSURE: 127 MMHG | TEMPERATURE: 97.9 F | DIASTOLIC BLOOD PRESSURE: 66 MMHG | HEIGHT: 63 IN | OXYGEN SATURATION: 99 % | BODY MASS INDEX: 47.66 KG/M2 | HEART RATE: 60 BPM

## 2023-01-05 DIAGNOSIS — R21 RASH: Primary | ICD-10-CM

## 2023-01-05 DIAGNOSIS — R21 RASH: ICD-10-CM

## 2023-01-05 PROCEDURE — 3078F DIAST BP <80 MM HG: CPT | Performed by: NURSE PRACTITIONER

## 2023-01-05 PROCEDURE — 86695 HERPES SIMPLEX TYPE 1 TEST: CPT

## 2023-01-05 PROCEDURE — 86787 VARICELLA-ZOSTER ANTIBODY: CPT

## 2023-01-05 PROCEDURE — 36415 COLL VENOUS BLD VENIPUNCTURE: CPT

## 2023-01-05 PROCEDURE — 99213 OFFICE O/P EST LOW 20 MIN: CPT | Performed by: NURSE PRACTITIONER

## 2023-01-05 PROCEDURE — 3074F SYST BP LT 130 MM HG: CPT | Performed by: NURSE PRACTITIONER

## 2023-01-05 RX ORDER — MUPIROCIN CALCIUM 20 MG/G
CREAM TOPICAL 2 TIMES DAILY
Qty: 30 G | Refills: 0 | Status: SHIPPED | OUTPATIENT
Start: 2023-01-05 | End: 2023-01-12

## 2023-01-05 NOTE — PROGRESS NOTES
1. \"Have you been to the ER, urgent care clinic since your last visit? Hospitalized since your last visit? \" No    2. \"Have you seen or consulted any other health care providers outside of the [address] [place] [address] since your last visit? \" No     3. For patients aged 39-70: Has the patient had a colonoscopy / FIT/ Cologuard? Yes - Care Gap present. Most recent result on file      If the patient is female:    4. For patients aged 41-77: Has the patient had a mammogram within the past 2 years? Yes - Care Gap present. Most recent result on file      5. For patients aged 21-65: Has the patient had a pap smear?  No

## 2023-01-05 NOTE — PROGRESS NOTES
Elba Jimenez is a 47 y.o. female who was seen in clinic today (1/5/2023) for Rash (Buttocks per patient x's a couple weeks)  . Assessment & Plan:   Diagnoses and all orders for this visit:    1. Rash  -     MISC. LAB TEST; Future    Other orders  -     mupirocin calcium (BACTROBAN) 2 % topical cream; Apply  to affected area two (2) times a day for 7 days. I have discussed the diagnosis with the patient and the intended plan as seen in the above orders. The patient has received an after-visit summary and questions were answered concerning future plans. I have discussed medication side effects and warnings with the patient as well. Patient agreeable with above plan and verbalizes understanding. Follow-up and Dispositions    Return if symptoms worsen or fail to improve. Subjective:   Patient reports she had noticed a rash to her buttocks over the last 2 weeks. States rash has been recurrent, first noticed June/July after sitting in daughter's backs seat(has a dog) thought it may have been flea bites. Rash resolved on it's own after a couple of weeks. It then returned a 2nd time, associated itching(bad), in Sept, area as bigger, resolved after a couple of weeks, no scabbing present with the first 2 episodes. Current rash was painful with associated pusulant drainage. Comments she thinks area has been healing and currently scabbed over but comments when her  looked at the area he informed her if the scab was removed area would likely bleed. Began 12/22/22. Did try otc topical cream for itching used for 2 days. Didn't use cream on Sunday, by Mon/Tues is when pain increased and  noticed pustulant appearance to rash.     Lab Results   Component Value Date/Time    Sodium 138 07/13/2022 08:26 AM    Potassium 4.1 07/13/2022 08:26 AM    Chloride 103 07/13/2022 08:26 AM    CO2 29 07/13/2022 08:26 AM    Anion gap 6 07/13/2022 08:26 AM    Glucose 110 (H) 07/13/2022 08:26 AM    BUN 16 07/13/2022 08:26 AM    Creatinine 0.79 07/13/2022 08:26 AM    BUN/Creatinine ratio 20 07/13/2022 08:26 AM    GFR est AA >60 07/13/2022 08:26 AM    GFR est non-AA >60 07/13/2022 08:26 AM    Calcium 9.1 07/13/2022 08:26 AM    Bilirubin, total 0.3 07/13/2022 08:26 AM    Alk. phosphatase 256 (H) 07/13/2022 08:26 AM    Protein, total 7.3 07/13/2022 08:26 AM    Albumin 4.0 07/13/2022 08:26 AM    Globulin 3.3 07/13/2022 08:26 AM    A-G Ratio 1.2 07/13/2022 08:26 AM    ALT (SGPT) 55 07/13/2022 08:26 AM    AST (SGOT) 49 (H) 07/13/2022 08:26 AM     Lab Results   Component Value Date/Time    Cholesterol, total 185 07/13/2022 08:26 AM    HDL Cholesterol 72 (H) 07/13/2022 08:26 AM    LDL, calculated 99.4 07/13/2022 08:26 AM    VLDL, calculated 13.6 07/13/2022 08:26 AM    Triglyceride 68 07/13/2022 08:26 AM    CHOL/HDL Ratio 2.6 07/13/2022 08:26 AM     Lab Results   Component Value Date/Time    Hemoglobin A1c 6.6 (H) 07/13/2022 08:26 AM    Hemoglobin A1c (POC) 6.2 07/20/2022 04:30 PM     Lab Results   Component Value Date/Time    Vitamin D 25-Hydroxy 36.0 02/07/2017 10:12 AM       Lab Results   Component Value Date/Time    WBC 7.0 05/25/2022 12:18 PM    Hemoglobin, POC 7.8 (L) 01/01/2012 04:07 AM    HGB 12.3 05/25/2022 12:18 PM    Hematocrit, POC 23 (L) 01/01/2012 04:07 AM    HCT 38.3 05/25/2022 12:18 PM    PLATELET 250 64/52/2136 12:18 PM    MCV 88.2 05/25/2022 12:18 PM       Wt Readings from Last 3 Encounters:   01/05/23 269 lb (122 kg)   01/03/23 267 lb (121.1 kg)   12/15/22 271 lb (122.9 kg)     Temp Readings from Last 3 Encounters:   01/05/23 97.9 °F (36.6 °C) (Temporal)   12/15/22 97.3 °F (36.3 °C) (Temporal)   07/20/22 98.1 °F (36.7 °C) (Temporal)     BP Readings from Last 3 Encounters:   01/05/23 127/66   01/03/23 134/80   12/15/22 138/79     Pulse Readings from Last 3 Encounters:   01/05/23 60   01/03/23 (!) 53   12/15/22 (!) 55       Prior to Admission medications    Medication Sig Start Date End Date Taking? Authorizing Provider   olmesartan-hydroCHLOROthiazide (BENICAR HCT) 20-12.5 mg per tablet Take 1 Tablet by mouth daily. 10/5/22  Yes Provider, Historical   gabapentin (NEURONTIN) 800 mg tablet Take 800 mg by mouth three (3) times daily. Yes Provider, Historical   losartan (COZAAR) 25 mg tablet TAKE 1 TABLET BY MOUTH EVERY DAY 12/23/22  Yes Mari Bryant MD   atorvastatin (LIPITOR) 20 mg tablet TAKE 1 TABLET BY MOUTH EVERY DAY 12/23/22  Yes Mari Bryant MD   carBAMazepine (TEGretol) 200 mg tablet TAKE 1 TABLET BY MOUTH TWICE A DAY 12/23/22  Yes Mari Bryant MD   mometasone (NASONEX) 50 mcg/actuation nasal spray USE 2 SPRAYS EACH NOSTRIL ONCE A DAY 1SPRAYS EACH NOSTRIL ONCE A DAY 11/17/22  Yes Provider, Historical   polyethylene glycol (MIRALAX) 17 gram packet Take 1 Packet by mouth daily for 30 days. 12/15/22 1/14/23 Yes Izabel Pham,    metoprolol tartrate (LOPRESSOR) 25 mg tablet Take 0.5 Tablets by mouth two (2) times a day. 12/14/22  Yes Omari Bernal MD   aspirin delayed-release 81 mg tablet TAKE 1 TABLET BY MOUTH EVERY DAY 10/7/22  Yes Mari Bryant MD   Omeprazole delayed release (PRILOSEC D/R) 20 mg tablet Take 20 mg by mouth in the morning. Yes Provider, Historical   magnesium oxide (MAG-OX) 400 mg tablet TAKE 1 TABLET BY MOUTH EVERY DAY 7/15/22  Yes Mari Bryant MD   nitroglycerin (NITROSTAT) 0.4 mg SL tablet Dissolve one tablet under the tongue at the start of chest pain every 5 minutes up to 3 doses. NOTIFY MD IF > 3 TABS NEEDED. 12/27/21  Yes Mari Braynt MD   lidocaine (LIDODERM) 5 % APPLY 1 NEW PATCH TO SKIN FOR LEFT DORSAL PAIN UP TO 3 PATCHES EACH TIME Q 12 HOURS PRN. 1/25/20  Yes Provider, Historical   DULoxetine (CYMBALTA) 60 mg capsule Take 1 Cap by mouth daily. 2/21/19  Yes Sujatha Wells NP   multivitamin (ONE A DAY) tablet Take 1 Tab by mouth daily.  3/31/15  Yes Rosa Oneal NP   omega-3 acid ethyl esters (LOVAZA) 1 gram capsule Take 1 Cap by mouth nightly. 5/14/14  Yes Howard Brito NP         The following sections were reviewed & updated as appropriate: PMH, PSH, FH, and SH. Review of Systems   Constitutional:  Negative for activity change, appetite change, chills, fatigue and fever. Respiratory:  Negative for chest tightness and shortness of breath. Cardiovascular:  Negative for chest pain. Skin:  Positive for rash. Neurological:  Negative for dizziness and headaches. Objective:   Visit Vitals  /66 (BP 1 Location: Left upper arm, BP Patient Position: Sitting, BP Cuff Size: Large adult)   Pulse 60   Temp 97.9 °F (36.6 °C) (Temporal)   Resp 16   Ht 5' 3\" (1.6 m)   Wt 269 lb (122 kg)   SpO2 99%   BMI 47.65 kg/m²      Physical Exam  Constitutional:       General: She is not in acute distress. Appearance: She is well-developed. HENT:      Head: Normocephalic and atraumatic. Neck:      Vascular: No carotid bruit. Cardiovascular:      Rate and Rhythm: Normal rate and regular rhythm. Heart sounds: Normal heart sounds. No murmur heard. No friction rub. No gallop. Pulmonary:      Effort: Pulmonary effort is normal.      Breath sounds: Normal breath sounds. No decreased breath sounds, wheezing, rhonchi or rales. Musculoskeletal:      Cervical back: Normal range of motion and neck supple. Lymphadenopathy:      Cervical: No cervical adenopathy. Skin:     General: Skin is warm and dry. Neurological:      Mental Status: She is alert and oriented to person, place, and time. Disclaimer: The patient understands our medical plan. Alternatives have been explained and offered. The risks, benefits and significant side effects of all medications have been reviewed. Anticipated time course and progression of condition reviewed. All questions have been addressed. She is encouraged to employ the information provided in the after visit summary, which was reviewed.       Where applicable, she is instructed to call the clinic if she has not been notified either by phone or through [address] with the results of her tests or with an appointment plan for any referrals within 1 week(s). No news is not good news; it's no news. The patient  is to call if her condition worsens or fails to improve or if significant side effects are experienced. Aspects of this note may have been generated using voice recognition software. Despite editing, there may be unrecognized errors.        Brii Tavarez, NP

## 2023-01-06 ENCOUNTER — TELEPHONE (OUTPATIENT)
Dept: FAMILY MEDICINE CLINIC | Age: 53
End: 2023-01-06

## 2023-01-08 ENCOUNTER — TELEPHONE (OUTPATIENT)
Dept: FAMILY MEDICINE CLINIC | Age: 53
End: 2023-01-08

## 2023-01-08 DIAGNOSIS — G50.0 TRIGEMINAL NEURALGIA OF LEFT SIDE OF FACE: ICD-10-CM

## 2023-01-08 LAB — VZV IGG SER IA-ACNC: 3401 INDEX

## 2023-01-09 LAB
HSV1 IGG SER IA-ACNC: 1.5 INDEX (ref 0–0.9)
HSV2 IGG SER IA-ACNC: >23.6 INDEX (ref 0–0.9)
VZV IGM SER IA-ACNC: <0.91 INDEX (ref 0–0.9)

## 2023-01-10 NOTE — TELEPHONE ENCOUNTER
This patient contacted office for the following prescriptions to be filled:    Last office visit: 1/5/23  Follow up appointment: 2/2/23   Medication requested :   Requested Prescriptions     Pending Prescriptions Disp Refills    carBAMazepine (TEGretol) 200 mg tablet 180 Tablet 1     Sig: Take 1 Tablet by mouth two (2) times a day. olmesartan-hydroCHLOROthiazide (BENICAR HCT) 20-12.5 mg per tablet       Sig: Take 1 Tablet by mouth daily.        PCP: Dr. April Rutherford   Mail order or Local pharmacy name  [address]

## 2023-01-12 ENCOUNTER — HOSPITAL ENCOUNTER (EMERGENCY)
Age: 53
Discharge: HOME | End: 2023-01-12
Attending: EMERGENCY MEDICINE
Payer: COMMERCIAL

## 2023-01-12 VITALS
RESPIRATION RATE: 17 BRPM | OXYGEN SATURATION: 95 % | DIASTOLIC BLOOD PRESSURE: 81 MMHG | WEIGHT: 270 LBS | TEMPERATURE: 98.9 F | HEART RATE: 58 BPM | SYSTOLIC BLOOD PRESSURE: 153 MMHG | BODY MASS INDEX: 47.84 KG/M2 | HEIGHT: 63 IN

## 2023-01-12 DIAGNOSIS — R07.89 CHEST WALL PAIN: Primary | ICD-10-CM

## 2023-01-12 LAB
D DIMER PPP FEU-MCNC: 0.46 UG/ML(FEU)
TROPONIN-HIGH SENSITIVITY: 6 NG/L (ref 0–54)

## 2023-01-12 PROCEDURE — 85379 FIBRIN DEGRADATION QUANT: CPT

## 2023-01-12 PROCEDURE — 93005 ELECTROCARDIOGRAM TRACING: CPT

## 2023-01-12 PROCEDURE — 99284 EMERGENCY DEPT VISIT MOD MDM: CPT

## 2023-01-12 PROCEDURE — 84484 ASSAY OF TROPONIN QUANT: CPT

## 2023-01-12 RX ORDER — OLMESARTAN MEDOXOMIL AND HYDROCHLOROTHIAZIDE 20/12.5 20; 12.5 MG/1; MG/1
1 TABLET ORAL DAILY
Qty: 90 TABLET | Refills: 1 | Status: SHIPPED | OUTPATIENT
Start: 2023-01-12

## 2023-01-12 RX ORDER — CARBAMAZEPINE 200 MG/1
200 TABLET ORAL 2 TIMES DAILY
Qty: 180 TABLET | Refills: 1 | Status: SHIPPED | OUTPATIENT
Start: 2023-01-12

## 2023-01-12 NOTE — TELEPHONE ENCOUNTER
Patient calling for the status of her med refill. States she needs it before she leaves town on Saturday.

## 2023-01-12 NOTE — TELEPHONE ENCOUNTER
Last Appointment 7/20/2022  Next Appointment 2/2/2023        Requested Prescriptions     Pending Prescriptions Disp Refills    carBAMazepine (TEGretol) 200 mg tablet 180 Tablet 1     Sig: Take 1 Tablet by mouth two (2) times a day. olmesartan-hydroCHLOROthiazide (BENICAR HCT) 20-12.5 mg per tablet       Sig: Take 1 Tablet by mouth daily.

## 2023-01-12 NOTE — ED PROVIDER NOTES
EMERGENCY DEPARTMENT HISTORY AND PHYSICAL EXAM    4:44 PM patient seen at this time in room QC      Date: 1/12/2023  Patient Name: Luh Pierre    History of Presenting Illness     Chief Complaint   Patient presents with    Chest Pain         History Provided By: patient    Additional History (Context): Luh Pierre is a 47 y.o. female presents with reports 3 days of on and off left upper chest pain, very sharp and well localized severe at its worst.  8 out of 10 at the time of my first exam.  It will go away completely in between. It has prevented her from going to sleep. Has never woken her up from sleep. No clearly exacerbating or alleviating factors, nonpleuritic not changed with food nonexertional.  No medications tried for this. No contributory medical history including no VTE risk. PCP: Alicia Green MD    Chief Complaint:   Duration:    Timing:    Location:   Quality:   Severity:   Modifying Factors:   Associated Symptoms:       Current Outpatient Medications   Medication Sig Dispense Refill    mupirocin calcium (BACTROBAN) 2 % topical cream Apply  to affected area two (2) times a day for 7 days. 30 g 0    olmesartan-hydroCHLOROthiazide (BENICAR HCT) 20-12.5 mg per tablet Take 1 Tablet by mouth daily. gabapentin (NEURONTIN) 800 mg tablet Take 800 mg by mouth three (3) times daily. losartan (COZAAR) 25 mg tablet TAKE 1 TABLET BY MOUTH EVERY DAY 90 Tablet 3    atorvastatin (LIPITOR) 20 mg tablet TAKE 1 TABLET BY MOUTH EVERY DAY 90 Tablet 3    carBAMazepine (TEGretol) 200 mg tablet TAKE 1 TABLET BY MOUTH TWICE A  Tablet 1    mometasone (NASONEX) 50 mcg/actuation nasal spray USE 2 SPRAYS EACH NOSTRIL ONCE A DAY 1SPRAYS EACH NOSTRIL ONCE A DAY      polyethylene glycol (MIRALAX) 17 gram packet Take 1 Packet by mouth daily for 30 days. 30 Packet 0    metoprolol tartrate (LOPRESSOR) 25 mg tablet Take 0.5 Tablets by mouth two (2) times a day.  90 Tablet 2    aspirin delayed-release 81 mg tablet TAKE 1 TABLET BY MOUTH EVERY DAY 90 Tablet 1    Omeprazole delayed release (PRILOSEC D/R) 20 mg tablet Take 20 mg by mouth in the morning.      magnesium oxide (MAG-OX) 400 mg tablet TAKE 1 TABLET BY MOUTH EVERY DAY 90 Tablet 1    nitroglycerin (NITROSTAT) 0.4 mg SL tablet Dissolve one tablet under the tongue at the start of chest pain every 5 minutes up to 3 doses. NOTIFY MD IF > 3 TABS NEEDED. 25 Tablet 0    lidocaine (LIDODERM) 5 % APPLY 1 NEW PATCH TO SKIN FOR LEFT DORSAL PAIN UP TO 3 PATCHES EACH TIME Q 12 HOURS PRN. DULoxetine (CYMBALTA) 60 mg capsule Take 1 Cap by mouth daily. 90 Cap 1    multivitamin (ONE A DAY) tablet Take 1 Tab by mouth daily. 30 Tab 11    omega-3 acid ethyl esters (LOVAZA) 1 gram capsule Take 1 Cap by mouth nightly. 90 Cap 3       Past History     Past Medical History:  Past Medical History:   Diagnosis Date    Abnormal PFT 10/1/2015    Dr. Jose Schroeder, Pulmonology    Acid reflux     CABG x 1 12/31/2011    LIMA-LAD    CAD (coronary artery disease)     Cardiac cath 11/23/2016    R dom. oLAD 85%. Otherwise patent coronaries. HECTOR to LAD patent. LVEDP 22 mmHg. EF 55%. No RWMA. Cardiac echocardiogram 07/28/2015    Tech difficult. EF 50-55%. No WMA. Mild conc LVH. Gr 2 DDfx. RVSP 45-50 mmHg. Mild LAE. Mild ROSITA. Mod TR. Mild IVCE. Cardiac nuclear imaging test, high risk 11/23/2016    High risk. Mainly fixed anteroapical defect w/partial reversibility. EF 60%. No RWMA. Nondiagnostic EKG on pharm stress test.    Cardiovascular upper extremity arterial duplex 01/03/2014    No significant occlusive arterial disease bilaterally.     Coronary atherosclerosis of native coronary artery     Diabetes mellitus ([facility] [address] 9/22/14    hgbA1C 6.8    Diabetic eye exam ([facility] [address] 2016    Dyslipidemia     GERD (gastroesophageal reflux disease)     H/O screening mammography 12/06/2016    no evidence of malignancy    Hypercholesterolemia     Hypomagnesemia 8/14/14    1.4 Pleural effusion, left     s/p CABG and thoracentesis with removal of 900 mL    Pulmonary arterial hypertension ([address]        Past Surgical History:  Past Surgical History:   Procedure Laterality Date    CARDIAC CATHETERIZATION  2016         CORONARY ARTERY ANGIOGRAM  2016         HX  SECTION      HX CORONARY ARTERY BYPASS GRAFT  12/31/2011    x1    LV ANGIOGRAPHY  2016         [place] UNLISTED PROCEDURE CARDIAC SURGERY         Family History:  Family History   Problem Relation Age of Onset    Diabetes Mother     Hypertension Father     Alzheimer's Disease Father 61       Social History:  Social History     Tobacco Use    Smoking status: Never    Smokeless tobacco: Never    Tobacco comments:     2nd home smoking from mother during 1st 17 yrs of life   Vaping Use    Vaping Use: Never used   Substance Use Topics    Alcohol use: No     Alcohol/week: 1.0 standard drink     Types: 1 Standard drinks or equivalent per week    Drug use: No       Allergies: Allergies   Allergen Reactions    Contrave [Naltrexone-Bupropion] Other (comments)     Severe constipation    Metformin Nausea Only         Review of Systems     Review of Systems   Constitutional:  Negative for diaphoresis and fever. HENT:  Negative for congestion and sore throat. Eyes:  Negative for pain and itching. Respiratory:  Negative for cough and shortness of breath. Cardiovascular:  Positive for chest pain. Negative for palpitations. Gastrointestinal:  Negative for abdominal pain and diarrhea. Endocrine: Negative for polydipsia and polyuria. Genitourinary:  Negative for dysuria and hematuria. Musculoskeletal:  Negative for arthralgias and myalgias. Skin:  Negative for rash and wound. Neurological:  Negative for seizures and syncope. Hematological:  Does not bruise/bleed easily. Psychiatric/Behavioral:  Negative for agitation and hallucinations.         Physical Exam       Patient Vitals for the past 12 hrs:   Temp Pulse Resp BP SpO2   01/12/23 1638 98.9 °F (37.2 °C) (!) 58 17 (!) 153/81 95 %       IPVITALS  Patient Vitals for the past 24 hrs:   BP Temp Pulse Resp SpO2 Height Weight   01/12/23 1638 (!) 153/81 98.9 °F (37.2 °C) (!) 58 17 95 % 5' 3\" (1.6 m) 122.5 kg (270 lb)       Physical Exam  Vitals and nursing note reviewed. Constitutional:       General: She is not in acute distress. Appearance: She is well-developed. She is obese. She is not ill-appearing. HENT:      Head: Normocephalic and atraumatic. Eyes:      General: No scleral icterus. Conjunctiva/sclera: Conjunctivae normal.   Neck:      Vascular: No JVD. Cardiovascular:      Rate and Rhythm: Normal rate and regular rhythm. Heart sounds: Normal heart sounds. Comments: 4 intact extremity pulses  Pulmonary:      Effort: Pulmonary effort is normal.      Breath sounds: Normal breath sounds. Chest:      Chest wall: Tenderness (About the level of the fifth rib left of the sternum and into the pectoralis muscle reproduces her pain.) present. Abdominal:      Palpations: Abdomen is soft. There is no mass. Tenderness: There is no abdominal tenderness. Musculoskeletal:         General: Normal range of motion. Cervical back: Normal range of motion and neck supple. Comments: Giving herself a hug with her upper extremities reproduces the pain. Lymphadenopathy:      Cervical: No cervical adenopathy. Skin:     General: Skin is warm and dry. Neurological:      Mental Status: She is alert.          Diagnostic Study Results   Labs -  Recent Results (from the past 24 hour(s))   EKG, 12 LEAD, INITIAL    Collection Time: 01/12/23  4:27 PM   Result Value Ref Range    Ventricular Rate 63 BPM    Atrial Rate 63 BPM    P-R Interval 152 ms    QRS Duration 90 ms    Q-T Interval 400 ms    QTC Calculation (Bezet) 409 ms    Calculated P Axis 27 degrees    Calculated R Axis 55 degrees    Calculated T Axis 90 degrees    Diagnosis       Normal sinus rhythm  Normal ECG  When compared with ECG of 22-DEC-2021 14:02,  T wave inversion no longer evident in Anterior leads     TROPONIN-HIGH SENSITIVITY    Collection Time: 01/12/23  4:36 PM   Result Value Ref Range    Troponin-High Sensitivity 6 0 - 54 ng/L   D DIMER    Collection Time: 01/12/23  4:36 PM   Result Value Ref Range    D DIMER 0.46 (H) <0.46 ug/ml(FEU)       Radiologic Studies -   No orders to display     No results found. Medications ordered:   Medications - No data to display      Medical Decision Making   Initial Medical Decision Making and DDx:  My interpretation twelve-lead EKG, sinus rhythm at 63, T wave inversions in 1 and aVL these are not changed compared to previous ones in our EMR. Not suggestive of PE ACS MI aortic disease pneumothorax pneumonia, giving waxing and waning pattern and still severe 3 days later without a bad outcome. Most suggestive of rib pain or rib muscle pain. We will get D-dimer and troponin for reassurance. 1 troponin sufficient. Assuming negative troponin heart score will be 1    ED Course: Progress Notes, Reevaluation, and Consults:         I am the first provider for this patient. I reviewed the vital signs, available nursing notes, past medical history, past surgical history, family history and social history. Patient Vitals for the past 12 hrs:   Temp Pulse Resp BP SpO2   01/12/23 1638 98.9 °F (37.2 °C) (!) 58 17 (!) 153/81 95 %       Vital Signs-Reviewed the patient's vital signs. Pulse Oximetry Analysis, Cardiac Monitor, 12 lead ekg:      Interpreted by the EP. Records Reviewed: Nursing notes reviewed (Time of Review: 4:44 PM)    Procedures:   Critical Care Time: 0  If critical care time is note it is exclusive of any separately billable procedures. Aspirin: (was aspirin given for stroke?)    Diagnosis     Clinical Impression:   1.  Chest wall pain        Disposition: Discharged      Follow-up Information       Follow up With Specialties Details Why Contact Info    Osbaldo Vinson MD Family Medicine In 3 days  [address] [facility]  [address]  [phone]               Patient's Medications   Start Taking    No medications on file   Continue Taking    ASPIRIN DELAYED-RELEASE 81 MG TABLET    TAKE 1 TABLET BY MOUTH EVERY DAY    ATORVASTATIN (LIPITOR) 20 MG TABLET    TAKE 1 TABLET BY MOUTH EVERY DAY    CARBAMAZEPINE (TEGRETOL) 200 MG TABLET    TAKE 1 TABLET BY MOUTH TWICE A DAY    DULOXETINE (CYMBALTA) 60 MG CAPSULE    Take 1 Cap by mouth daily. GABAPENTIN (NEURONTIN) 800 MG TABLET    Take 800 mg by mouth three (3) times daily. LIDOCAINE (LIDODERM) 5 %    APPLY 1 NEW PATCH TO SKIN FOR LEFT DORSAL PAIN UP TO 3 PATCHES EACH TIME Q 12 HOURS PRN. LOSARTAN (COZAAR) 25 MG TABLET    TAKE 1 TABLET BY MOUTH EVERY DAY    MAGNESIUM OXIDE (MAG-OX) 400 MG TABLET    TAKE 1 TABLET BY MOUTH EVERY DAY    METOPROLOL TARTRATE (LOPRESSOR) 25 MG TABLET    Take 0.5 Tablets by mouth two (2) times a day. MOMETASONE (NASONEX) 50 MCG/ACTUATION NASAL SPRAY    USE 2 SPRAYS EACH NOSTRIL ONCE A DAY 1SPRAYS EACH NOSTRIL ONCE A DAY    MULTIVITAMIN (ONE A DAY) TABLET    Take 1 Tab by mouth daily. MUPIROCIN CALCIUM (BACTROBAN) 2 % TOPICAL CREAM    Apply  to affected area two (2) times a day for 7 days. NITROGLYCERIN (NITROSTAT) 0.4 MG SL TABLET    Dissolve one tablet under the tongue at the start of chest pain every 5 minutes up to 3 doses. NOTIFY MD IF > 3 TABS NEEDED. OLMESARTAN-HYDROCHLOROTHIAZIDE (BENICAR HCT) 20-12.5 MG PER TABLET    Take 1 Tablet by mouth daily. OMEGA-3 ACID ETHYL ESTERS (LOVAZA) 1 GRAM CAPSULE    Take 1 Cap by mouth nightly. OMEPRAZOLE DELAYED RELEASE (PRILOSEC D/R) 20 MG TABLET    Take 20 mg by mouth in the morning. POLYETHYLENE GLYCOL (MIRALAX) 17 GRAM PACKET    Take 1 Packet by mouth daily for 30 days.    These Medications have changed    No medications on file   Stop Taking    No medications on file _______________________________    Notes:    Harle Escort, MD using Dragon dictation      _______________________________

## 2023-01-13 LAB
ATRIAL RATE: 63 BPM
CALCULATED P AXIS, ECG09: 27 DEGREES
CALCULATED R AXIS, ECG10: 55 DEGREES
CALCULATED T AXIS, ECG11: 90 DEGREES
DIAGNOSIS, 93000: NORMAL
P-R INTERVAL, ECG05: 152 MS
Q-T INTERVAL, ECG07: 400 MS
QRS DURATION, ECG06: 90 MS
QTC CALCULATION (BEZET), ECG08: 409 MS
VENTRICULAR RATE, ECG03: 63 BPM

## 2023-02-02 ENCOUNTER — OFFICE VISIT (OUTPATIENT)
Dept: FAMILY MEDICINE CLINIC | Age: 53
End: 2023-02-02
Payer: COMMERCIAL

## 2023-02-02 VITALS
HEART RATE: 64 BPM | DIASTOLIC BLOOD PRESSURE: 73 MMHG | RESPIRATION RATE: 16 BRPM | WEIGHT: 267.3 LBS | BODY MASS INDEX: 47.36 KG/M2 | TEMPERATURE: 97.3 F | OXYGEN SATURATION: 96 % | HEIGHT: 63 IN | SYSTOLIC BLOOD PRESSURE: 111 MMHG

## 2023-02-02 DIAGNOSIS — R13.14 PHARYNGOESOPHAGEAL DYSPHAGIA: ICD-10-CM

## 2023-02-02 DIAGNOSIS — E11.65 TYPE 2 DIABETES MELLITUS WITH HYPERGLYCEMIA, WITHOUT LONG-TERM CURRENT USE OF INSULIN (HCC): ICD-10-CM

## 2023-02-02 DIAGNOSIS — Z01.419 WELL WOMAN EXAM WITH ROUTINE GYNECOLOGICAL EXAM: Primary | ICD-10-CM

## 2023-02-02 RX ORDER — LOSARTAN POTASSIUM 25 MG/1
25 TABLET ORAL DAILY
COMMUNITY
End: 2023-02-12

## 2023-02-02 RX ORDER — POLYETHYLENE GLYCOL 3350 17 G/17G
17 POWDER, FOR SOLUTION ORAL DAILY
COMMUNITY

## 2023-02-02 NOTE — PROGRESS NOTES
1. \"Have you been to the ER, urgent care clinic since your last visit? Hospitalized since your last visit? \" No    2. \"Have you seen or consulted any other health care providers outside of the [address] [place] [address] since your last visit? \" No     3. For patients aged 39-70: Has the patient had a colonoscopy / FIT/ Cologuard? Yes - Care Gap present. Most recent result on file      If the patient is female:    4. For patients aged 41-77: Has the patient had a mammogram within the past 2 years? Yes - Care Gap present. Most recent result on file      5. For patients aged 21-65: Has the patient had a pap smear?  No

## 2023-02-02 NOTE — PROGRESS NOTES
Subjective:   46 y.o. female for Well Woman Check. She is postmenopausal.  Social History: {Sexual Hx:5163}. Pertinent past medical hstory: {contraception pert hx:80656::\"no history of HTN, DVT, CAD, DM, liver disease, migraines or smoking\"}. {Choose one or more SmartLinks; press DELETE if none desired:8454807}     ROS:  Feeling well. No dyspnea or chest pain on exertion. No abdominal pain, change in bowel habits, black or bloody stools. No urinary tract symptoms. GYN ROS: {gyn ros:864939::\"no breast pain or new or enlarging lumps on self exam\",\"no vaginal bleeding\",\"no discharge or pelvic pain\"}. Menopausal symptoms: {Gyn cyclic WM:12802::\"WHCE\"}. No neurological complaints. Last DEXA scan and T-score: ***none  Last Colonoscopy: ***none  Last Mammogram: ***none    Objective:   Visit Vitals  Temp 97.3 °F (36.3 °C) (Temporal)   Ht 5' 3\" (1.6 m)   Wt 267 lb 4.8 oz (121.2 kg)   BMI 47.35 kg/m²     The patient appears well, alert, oriented x 3, in no distress. ENT normal.  Neck supple. No adenopathy or thyromegaly. TOMASZ. Lungs are clear, good air entry, no wheezes, rhonchi or rales. S1 and S2 normal, no murmurs, regular rate and rhythm. Abdomen soft without tenderness, guarding, mass or organomegaly. Extremities show no edema, normal peripheral pulses. Neurological is normal, no focal findings. BREAST EXAM: {pe breast exam:085750::\"breasts appear normal, no suspicious masses, no skin or nipple changes or axillary nodes\"}    PELVIC EXAM: {pelvic exam:926910::\"normal external genitalia, vulva, vagina, cervix, uterus and adnexa\"}    Assessment/Plan:   {gyn assessment:138296:p:\"well woman\",\"postmenopausal\"}  {gyn plan:589283:p:\"mammogram\",\"pap smear\",\"bone density screening ordered\",\"screening colonoscopy referral written\",\"return annually or prn\"}  {Assessment and Plan:14276}. echocardiogram 2015    Tech difficult. EF 50-55%. No WMA. Mild conc LVH. Gr 2 DDfx. RVSP 45-50 mmHg. Mild LAE. Mild ROSITA. Mod TR. Mild IVCE. Cardiac nuclear imaging test, high risk 2016    High risk. Mainly fixed anteroapical defect w/partial reversibility. EF 60%. No RWMA. Nondiagnostic EKG on pharm stress test.    Cardiovascular upper extremity arterial duplex 2014    No significant occlusive arterial disease bilaterally. Coronary atherosclerosis of native coronary artery     Diabetes mellitus ([address] 14    hgbA1C 6.8    Diabetic eye exam ([facility] [address]     Dyslipidemia     GERD (gastroesophageal reflux disease)     H/O screening mammography 2016    no evidence of malignancy    Hypercholesterolemia     Hypomagnesemia 14    1.4    Pleural effusion, left     s/p CABG and thoracentesis with removal of 900 mL    Pulmonary arterial hypertension ([address]      Past Surgical History:   Procedure Laterality Date    CARDIAC CATHETERIZATION  2016         CORONARY ARTERY ANGIOGRAM  2016         HX  SECTION      HX CORONARY ARTERY BYPASS GRAFT  12/31/2011    x1    LV ANGIOGRAPHY  2016         [place] UNLISTED PROCEDURE CARDIAC SURGERY       Family History   Problem Relation Age of Onset    Diabetes Mother     Hypertension Father     Alzheimer's Disease Father 61     Social History     Tobacco Use    Smoking status: Never    Smokeless tobacco: Never    Tobacco comments:     2nd home smoking from mother during 1st 17 yrs of life   Substance Use Topics    Alcohol use: No     Alcohol/week: 1.0 standard drink     Types: 1 Standard drinks or equivalent per week        ROS:  Feeling well. No dyspnea or chest pain on exertion. No abdominal pain, change in bowel habits, black or bloody stools. No urinary tract symptoms. GYN ROS: no breast pain or new or enlarging lumps on self exam, no vaginal bleeding, no discharge or pelvic pain. Menopausal symptoms: none.  No neurological complaints. Objective:   Visit Vitals  /73 (BP 1 Location: Left upper arm, BP Patient Position: Sitting, BP Cuff Size: Large adult)   Pulse 64   Temp 97.3 °F (36.3 °C) (Temporal)   Resp 16   Ht 5' 3\" (1.6 m)   Wt 267 lb 4.8 oz (121.2 kg)   SpO2 96%   BMI 47.35 kg/m²     The patient appears well, alert, oriented x 3, in no distress. ENT normal.  Neck supple. No adenopathy or thyromegaly. TOMASZ. Lungs are clear, good air entry, no wheezes, rhonchi or rales. S1 and S2 normal, no murmurs, regular rate and rhythm. Abdomen soft without tenderness, guarding, mass or organomegaly. Extremities show no edema, normal peripheral pulses. Neurological is normal, no focal findings. BREAST EXAM: breasts appear normal, no suspicious masses, no skin or nipple changes or axillary nodes    PELVIC EXAM: normal external genitalia, vulva, vagina, cervix, uterus and adnexa    Assessment/Plan:   well woman  postmenopausal  mammogram  pap smear  return annually or prn    ICD-10-CM ICD-9-CM    1. Well woman exam with routine gynecological exam  Z01.419 V72.31 PAP IG, RFX APTIMA HPV ASCUS (429236)    [V72.31]      2. Type 2 diabetes mellitus with hyperglycemia, without long-term current use of insulin (HCC)  E11.65 250.00 LIPID PANEL     505.24 METABOLIC PANEL, COMPREHENSIVE      HEMOGLOBIN A1C WITH EAG      3. Pharyngoesophageal dysphagia  R13.14 787.24 REFERRAL TO GASTROENTEROLOGY      . As above  #3 is new  GI referral  Dc losartan  Continue Benicar HCT  Labs as per orders  Follow-up and Dispositions    Return in 2 months (on 4/2/2023) for htn. This has been fully explained to the patient, who indicates understanding. An After Visit Summary was printed and given to the patient.

## 2023-02-03 ENCOUNTER — HOSPITAL ENCOUNTER (OUTPATIENT)
Dept: LAB | Age: 53
Discharge: HOME | End: 2023-02-03
Payer: COMMERCIAL

## 2023-02-03 PROCEDURE — 88175 CYTOPATH C/V AUTO FLUID REDO: CPT

## 2023-02-12 NOTE — PATIENT INSTRUCTIONS
DASH Diet: Care Instructions  Your Care Instructions     The DASH diet is an eating plan that can help lower your blood pressure. DASH stands for Dietary Approaches to Stop Hypertension. Hypertension is high blood pressure. The DASH diet focuses on eating foods that are high in calcium, potassium, and magnesium. These nutrients can lower blood pressure. The foods that are highest in these nutrients are fruits, vegetables, low-fat dairy products, nuts, seeds, and legumes. But taking calcium, potassium, and magnesium supplements instead of eating foods that are high in those nutrients does not have the same effect. The DASH diet also includes whole grains, fish, and poultry. The DASH diet is one of several lifestyle changes your doctor may recommend to lower your high blood pressure. Your doctor may also want you to decrease the amount of sodium in your diet. Lowering sodium while following the DASH diet can lower blood pressure even further than just the DASH diet alone. Follow-up care is a key part of your treatment and safety. Be sure to make and go to all appointments, and call your doctor if you are having problems. It's also a good idea to know your test results and keep a list of the medicines you take. How can you care for yourself at home? Following the DASH diet  Eat 4 to 5 servings of fruit each day. A serving is 1 medium-sized piece of fruit, ½ cup chopped or canned fruit, 1/4 cup dried fruit, or 4 ounces (½ cup) of fruit juice. Choose fruit more often than fruit juice. Eat 4 to 5 servings of vegetables each day. A serving is 1 cup of lettuce or raw leafy vegetables, ½ cup of chopped or cooked vegetables, or 4 ounces (½ cup) of vegetable juice. Choose vegetables more often than vegetable juice. Get 2 to 3 servings of low-fat and fat-free dairy each day. A serving is 8 ounces of milk, 1 cup of yogurt, or 1 ½ ounces of cheese. Eat 6 to 8 servings of grains each day.  A serving is 1 slice of bread, 1 ounce of dry cereal, or ½ cup of cooked rice, pasta, or cooked cereal. Try to choose whole-grain products as much as possible. Limit lean meat, poultry, and fish to 2 servings each day. A serving is 3 ounces, about the size of a deck of cards. Eat 4 to 5 servings of nuts, seeds, and legumes (cooked dried beans, lentils, and split peas) each week. A serving is 1/3 cup of nuts, 2 tablespoons of seeds, or ½ cup of cooked beans or peas. Limit fats and oils to 2 to 3 servings each day. A serving is 1 teaspoon of vegetable oil or 2 tablespoons of salad dressing. Limit sweets and added sugars to 5 servings or less a week. A serving is 1 tablespoon jelly or jam, ½ cup sorbet, or 1 cup of lemonade. Eat less than 2,300 milligrams (mg) of sodium a day. If you limit your sodium to 1,500 mg a day, you can lower your blood pressure even more. Be aware that all of these are the suggested number of servings for people who eat 1,800 to 2,000 calories a day. Your recommended number of servings may be different if you need more or fewer calories. Tips for success  Start small. Do not try to make dramatic changes to your diet all at once. You might feel that you are missing out on your favorite foods and then be more likely to not follow the plan. Make small changes, and stick with them. Once those changes become habit, add a few more changes. Try some of the following:  Make it a goal to eat a fruit or vegetable at every meal and at snacks. This will make it easy to get the recommended amount of fruits and vegetables each day. Try yogurt topped with fruit and nuts for a snack or healthy dessert. Add lettuce, tomato, cucumber, and onion to sandwiches. Combine a ready-made pizza crust with low-fat mozzarella cheese and lots of vegetable toppings. Try using tomatoes, squash, spinach, broccoli, carrots, cauliflower, and onions.   Have a variety of cut-up vegetables with a low-fat dip as an appetizer instead of chips and dip.  Sprinkle sunflower seeds or chopped almonds over salads. Or try adding chopped walnuts or almonds to cooked vegetables. Try some vegetarian meals using beans and peas. Add garbanzo or kidney beans to salads. Make burritos and tacos with mashed souza beans or black beans. Where can you learn more? Go to http://www.ferris.com/  Enter H967 in the search box to learn more about \"DASH Diet: Care Instructions. \"  Current as of: January 10, 2022               Content Version: 13.4  © 2365-2561 [employer]. Care instructions adapted under license by [employer] (which disclaims liability or warranty for this information). If you have questions about a medical condition or this instruction, always ask your healthcare professional. [address] any warranty or liability for your use of this information.

## 2023-02-27 ENCOUNTER — TRANSCRIBE ORDERS (OUTPATIENT)
Facility: HOSPITAL | Age: 53
End: 2023-02-27

## 2023-02-27 DIAGNOSIS — Z12.31 VISIT FOR SCREENING MAMMOGRAM: Primary | ICD-10-CM

## 2023-03-05 DIAGNOSIS — K59.00 CONSTIPATION, UNSPECIFIED: ICD-10-CM

## 2023-03-06 NOTE — TELEPHONE ENCOUNTER
Last Visit: 02-   Next Appointment: 06-        Requested Prescriptions     Pending Prescriptions Disp Refills    [facility] PURELAX 17 g packet [Pharmacy Med Name: [facility] PURELAX POWDER PACKET]       Sig: TAKE 1 PACKET BY MOUTH DAILY FOR 30 DAYS.  *NOT COVERED

## 2023-03-09 RX ORDER — POLYETHYLENE GLYCOL 3350 17 G/17G
17 POWDER, FOR SOLUTION ORAL DAILY PRN
Qty: 30 EACH | Refills: 1 | Status: SHIPPED | OUTPATIENT
Start: 2023-03-09

## 2023-03-23 ENCOUNTER — HOSPITAL ENCOUNTER (OUTPATIENT)
Facility: HOSPITAL | Age: 53
Discharge: HOME | End: 2023-03-23
Payer: COMMERCIAL

## 2023-03-23 DIAGNOSIS — Z12.31 VISIT FOR SCREENING MAMMOGRAM: ICD-10-CM

## 2023-03-23 PROCEDURE — 77067 SCR MAMMO BI INCL CAD: CPT

## 2023-03-23 PROCEDURE — 77063 BREAST TOMOSYNTHESIS BI: CPT

## 2023-04-19 ENCOUNTER — ANESTHESIA EVENT (OUTPATIENT)
Facility: HOSPITAL | Age: 53
End: 2023-04-19
Payer: COMMERCIAL

## 2023-04-19 RX ORDER — LINACLOTIDE 290 UG/1
1 CAPSULE, GELATIN COATED ORAL DAILY
COMMUNITY
Start: 2023-03-03

## 2023-04-19 RX ORDER — SEMAGLUTIDE 1.34 MG/ML
INJECTION, SOLUTION SUBCUTANEOUS
COMMUNITY
Start: 2023-02-02

## 2023-04-25 ENCOUNTER — ANESTHESIA (OUTPATIENT)
Facility: HOSPITAL | Age: 53
End: 2023-04-25
Payer: COMMERCIAL

## 2023-04-25 ENCOUNTER — HOSPITAL ENCOUNTER (OUTPATIENT)
Facility: HOSPITAL | Age: 53
Setting detail: OUTPATIENT SURGERY
Discharge: HOME | End: 2023-04-25
Attending: INTERNAL MEDICINE | Admitting: INTERNAL MEDICINE
Payer: COMMERCIAL

## 2023-04-25 VITALS
WEIGHT: 258.38 LBS | TEMPERATURE: 98.4 F | DIASTOLIC BLOOD PRESSURE: 76 MMHG | RESPIRATION RATE: 16 BRPM | HEIGHT: 63 IN | OXYGEN SATURATION: 99 % | HEART RATE: 76 BPM | SYSTOLIC BLOOD PRESSURE: 118 MMHG | BODY MASS INDEX: 45.78 KG/M2

## 2023-04-25 LAB
GLUCOSE BLD STRIP.AUTO-MCNC: 103 MG/DL (ref 70–110)
GLUCOSE BLD STRIP.AUTO-MCNC: 108 MG/DL (ref 70–110)

## 2023-04-25 PROCEDURE — 00813 ANES UPR LWR GI NDSC PX: CPT | Performed by: ANESTHESIOLOGY

## 2023-04-25 PROCEDURE — 3700000000 HC ANESTHESIA ATTENDED CARE: Performed by: INTERNAL MEDICINE

## 2023-04-25 PROCEDURE — 3600007513: Performed by: INTERNAL MEDICINE

## 2023-04-25 PROCEDURE — 6360000002 HC RX W HCPCS: Performed by: ANESTHESIOLOGY

## 2023-04-25 PROCEDURE — 2580000003 HC RX 258: Performed by: NURSE ANESTHETIST, CERTIFIED REGISTERED

## 2023-04-25 PROCEDURE — 2500000003 HC RX 250 WO HCPCS: Performed by: ANESTHESIOLOGY

## 2023-04-25 PROCEDURE — 7100000010 HC PHASE II RECOVERY - FIRST 15 MIN: Performed by: INTERNAL MEDICINE

## 2023-04-25 PROCEDURE — 2709999900 HC NON-CHARGEABLE SUPPLY: Performed by: INTERNAL MEDICINE

## 2023-04-25 PROCEDURE — 3700000001 HC ADD 15 MINUTES (ANESTHESIA): Performed by: INTERNAL MEDICINE

## 2023-04-25 PROCEDURE — 3600007503: Performed by: INTERNAL MEDICINE

## 2023-04-25 PROCEDURE — 82962 GLUCOSE BLOOD TEST: CPT

## 2023-04-25 PROCEDURE — 7100000000 HC PACU RECOVERY - FIRST 15 MIN: Performed by: INTERNAL MEDICINE

## 2023-04-25 RX ORDER — PROPOFOL 10 MG/ML
INJECTION, EMULSION INTRAVENOUS PRN
Status: DISCONTINUED | OUTPATIENT
Start: 2023-04-25 | End: 2023-04-25 | Stop reason: SDUPTHER

## 2023-04-25 RX ORDER — LIDOCAINE HYDROCHLORIDE 20 MG/ML
INJECTION, SOLUTION EPIDURAL; INFILTRATION; INTRACAUDAL; PERINEURAL PRN
Status: DISCONTINUED | OUTPATIENT
Start: 2023-04-25 | End: 2023-04-25 | Stop reason: SDUPTHER

## 2023-04-25 RX ORDER — INSULIN LISPRO 100 [IU]/ML
0-16 INJECTION, SOLUTION INTRAVENOUS; SUBCUTANEOUS AS NEEDED
Status: DISCONTINUED | OUTPATIENT
Start: 2023-04-25 | End: 2023-04-25 | Stop reason: HOSPADM

## 2023-04-25 RX ORDER — SODIUM CHLORIDE 9 MG/ML
INJECTION, SOLUTION INTRAVENOUS PRN
Status: DISCONTINUED | OUTPATIENT
Start: 2023-04-25 | End: 2023-04-25 | Stop reason: HOSPADM

## 2023-04-25 RX ORDER — SODIUM CHLORIDE 0.9 % (FLUSH) 0.9 %
5-40 SYRINGE (ML) INJECTION PRN
Status: DISCONTINUED | OUTPATIENT
Start: 2023-04-25 | End: 2023-04-25 | Stop reason: HOSPADM

## 2023-04-25 RX ORDER — SODIUM CHLORIDE 0.9 % (FLUSH) 0.9 %
5-40 SYRINGE (ML) INJECTION EVERY 12 HOURS SCHEDULED
Status: DISCONTINUED | OUTPATIENT
Start: 2023-04-25 | End: 2023-04-25 | Stop reason: HOSPADM

## 2023-04-25 RX ORDER — LIDOCAINE HYDROCHLORIDE 10 MG/ML
1 INJECTION, SOLUTION EPIDURAL; INFILTRATION; INTRACAUDAL; PERINEURAL
Status: DISCONTINUED | OUTPATIENT
Start: 2023-04-25 | End: 2023-04-25 | Stop reason: HOSPADM

## 2023-04-25 RX ADMIN — PROPOFOL 20 MG: 10 INJECTION, EMULSION INTRAVENOUS at 12:23

## 2023-04-25 RX ADMIN — PROPOFOL 50 MG: 10 INJECTION, EMULSION INTRAVENOUS at 12:18

## 2023-04-25 RX ADMIN — PROPOFOL 50 MG: 10 INJECTION, EMULSION INTRAVENOUS at 12:13

## 2023-04-25 RX ADMIN — LIDOCAINE HYDROCHLORIDE 60 MG: 20 INJECTION, SOLUTION EPIDURAL; INFILTRATION; INTRACAUDAL; PERINEURAL at 12:04

## 2023-04-25 RX ADMIN — PROPOFOL 50 MG: 10 INJECTION, EMULSION INTRAVENOUS at 12:08

## 2023-04-25 RX ADMIN — SODIUM CHLORIDE: 9 INJECTION, SOLUTION INTRAVENOUS at 11:40

## 2023-04-25 RX ADMIN — PROPOFOL 100 MG: 10 INJECTION, EMULSION INTRAVENOUS at 12:04

## 2023-04-25 ASSESSMENT — PAIN SCALES - GENERAL
PAINLEVEL_OUTOF10: 0

## 2023-04-25 ASSESSMENT — PAIN - FUNCTIONAL ASSESSMENT: PAIN_FUNCTIONAL_ASSESSMENT: 0-10

## 2023-04-25 NOTE — DISCHARGE INSTRUCTIONS
Upper GI Endoscopy: What to Expect at [address] had an upper GI endoscopy. Your doctor used a thin, lighted tube that bends to look at the inside of your esophagus, your stomach, and the first part of the small intestine, called the duodenum. After you have an endoscopy, you will stay at the hospital or clinic for 1 to 2 hours. This will allow the medicine to wear off. You will be able to go home after your doctor or nurse checks to make sure that you're not having any problems. You may have to stay overnight if you had treatment during the test. You may have a sore throat for a day or two after the test.  This care sheet gives you a general idea about what to expect after the test.  How can you care for yourself at home? Activity   Rest as much as you need to after you go home. You should be able to go back to your usual activities the day after the test.  Diet   Follow your doctor's directions for eating after the test.  Drink plenty of fluids (unless your doctor has told you not to). Medications   If you have a sore throat the day after the test, use an over-the-counter spray to numb your throat. Follow-up care is a key part of your treatment and safety. Be sure to make and go to all appointments, and call your doctor if you are having problems. It's also a good idea to know your test results and keep a list of the medicines you take. When should you call for help? Call 911 anytime you think you may need emergency care. For example, call if:    You passed out (lost consciousness). You have trouble breathing. You pass maroon or bloody stools. Call your doctor now or seek immediate medical care if:    You have pain that does not get better after your take pain medicine. You have new or worse belly pain. You have blood in your stools. You are sick to your stomach and cannot keep fluids down. You have a fever. You cannot pass stools or gas.    Watch closely for

## 2023-04-25 NOTE — H&P
Chief Complaint: GERD and CRCS    History of present illness: Dysphagia to solids. Long standing heartburn. PMH:   Past Medical History:   Diagnosis Date    Abnormal PFT 10/1/2015    Dr. Rere Howell, Pulmonology    Acid reflux     CAD (coronary artery disease)     Coronary atherosclerosis of native coronary artery     Diabetes mellitus ([facility] 9/22/14    hgbA1C 6.8    Diabetic eye exam ([facility] 2016    Dyslipidemia     Echocardiogram abnormal 07/28/2015    Tech difficult. EF 50-55%. No WMA. Mild conc LVH. Gr 2 DDfx. RVSP 45-50 mmHg. Mild LAE. Mild LUPE. Mod TR. Mild IVCE. GERD (gastroesophageal reflux disease)     H/O screening mammography 12/06/2016    no evidence of malignancy    History of myocardial perfusion scan 11/23/2016    High risk. Mainly fixed anteroapical defect w/partial reversibility. EF 60%. No RWMA. Nondiagnostic EKG on pharm stress test.    Hypercholesterolemia     Hypomagnesemia 8/14/14    1.4    PAD (peripheral artery disease) ([facility] 01/03/2014    No significant occlusive arterial disease bilaterally. Pharyngoesophageal dysphagia     Pleural effusion, left     s/p CABG and thoracentesis with removal of 900 mL    Pulmonary arterial hypertension (HCC)     S/P CABG x 1 12/31/2011    LIMA-LAD    S/P cardiac cath 11/23/2016    R dom. oLAD 85%. Otherwise patent coronaries. JORDAN to LAD patent. LVEDP 22 mmHg. EF 55%. No RWMA. Trigeminal neuralgia of left side of face     per medical notes epic     Allergies:    Allergies   Allergen Reactions    Metformin Nausea Only    Naltrexone-Bupropion Hcl Er Other (See Comments)     Severe constipation     Medications:   Current Facility-Administered Medications:     lidocaine PF 1 % injection 1 mL, 1 mL, IntraDERmal, Once PRN, [place] Azeem, APRN - CRNA    sodium chloride flush 0.9 % injection 5-40 mL, 5-40 mL, IntraVENous, 2 times per day, [place] Azeem, APRN - CRNA    sodium chloride flush 0.9 % injection 5-40 mL, 5-40 mL, IntraVENous,

## 2023-04-25 NOTE — ANESTHESIA POSTPROCEDURE EVALUATION
Department of Anesthesiology  Postprocedure Note    Patient: Gaetano Ward  MRN: [id number]  YOB: 1970  Date of evaluation: 4/25/2023      Procedure Summary     Date: 04/25/23 Room / Location: [facility] ENDO 02 / [facility] ENDOSCOPY    Anesthesia Start: 1039 Anesthesia Stop: 200    Procedures:       EGD ESOPHAGOGASTRODUODENOSCOPY DILATION (Upper GI Region)      COLONOSCOPY (Abdomen) Diagnosis:       Esophageal dysphagia      Chronic idiopathic constipation      Esophageal dysmotility      Screening for colorectal cancer      BMI 45.0-49.9, adult (HCC)      (Esophageal dysphagia [R13.19])      (Chronic idiopathic constipation [K59.04])      (Esophageal dysmotility [K22.4])      (Screening for colorectal cancer [Z12.11, Z12.12])      (BMI 45.0-49.9, adult ([address] [Z68.42])    Surgeons: Betha Landau, MD Responsible Provider: Keith Hutchison MD    Anesthesia Type: MAC ASA Status: 3          Anesthesia Type: MAC    Alden Phase I: Alden Score: 10    Alden Phase II: Alden Score: 10      Anesthesia Post Evaluation    Patient location during evaluation: PACU  Patient participation: complete - patient participated  Level of consciousness: awake  Airway patency: patent  Nausea & Vomiting: no vomiting  Complications: no  Cardiovascular status: hemodynamically stable  Respiratory status: acceptable  Hydration status: stable

## 2023-04-25 NOTE — ANESTHESIA PRE PROCEDURE
Department of Anesthesiology  Preprocedure Note       Name:  Kyung Jean   Age:  46 y.o.  :  1970                                          MRN:  [id number]         Date:  2023      Surgeon: Janine Beasley): Brook Beasley MD    Procedure: Procedure(s):  EGD ESOPHAGOGASTRODUODENOSCOPY DILATION  COLONOSCOPY    Medications prior to admission:   Prior to Admission medications    Medication Sig Start Date End Date Taking? Authorizing Provider   LINZESS 290 MCG CAPS capsule Take 1 capsule by mouth daily 3/3/23   Historical Provider, MD   OZEMPIC, 0.25 OR 0.5 MG/DOSE, 2 MG/1.5ML SOPN INJECT 0.25 MG SUBCUTANEOUSLY EVERY 7 DAYS 23   Historical Provider, MD   polyethylene glycol ([facility] PURELAX) 17 g packet Take 17 g by mouth daily as needed for Constipation 3/9/23   Arleth Murray MD   aspirin 81 MG EC tablet TAKE 1 TABLET BY MOUTH EVERY DAY 10/7/22   Ar Automatic Reconciliation   atorvastatin (LIPITOR) 20 MG tablet TAKE 1 TABLET BY MOUTH EVERY DAY 22   Ar Automatic Reconciliation   carBAMazepine (TEGRETOL) 200 MG tablet TAKE 1 TABLET BY MOUTH TWICE A DAY 22   Ar Automatic Reconciliation   DULoxetine (CYMBALTA) 60 MG extended release capsule Take 1 capsule by mouth daily 19   Ar Automatic Reconciliation   gabapentin (NEURONTIN) 300 MG capsule 1 capsule 3 times daily.  3/2/20   Ar Automatic Reconciliation   lidocaine (LIDODERM) 5 % APPLY 1 NEW PATCH TO SKIN FOR LEFT DORSAL PAIN UP TO 3 PATCHES EACH TIME Q 12 HOURS PRN. 20   Ar Automatic Reconciliation   losartan (COZAAR) 25 MG tablet Take 1 tablet by mouth daily 22   Ar Automatic Reconciliation   magnesium oxide (MAG-OX) 400 MG tablet TAKE 1 TABLET BY MOUTH EVERY DAY 7/15/22   Ar Automatic Reconciliation   metoprolol tartrate (LOPRESSOR) 25 MG tablet Take 1 tablet by mouth daily 22   Ar Automatic Reconciliation   mometasone (NASONEX) 50 MCG/ACT nasal spray USE 2 SPRAYS EACH NOSTRIL ONCE A DAY 1SPRAYS EACH NOSTRIL ONCE A DAY

## 2023-04-25 NOTE — BRIEF OP NOTE
[facility]  [facility], [address]      Brief Procedure Note    Letha Villatoro  1970  [id number]    Date of Procedure: 4/25/2023     Preoperative diagnosis: Esophageal dysphagia [R13.19]  Chronic idiopathic constipation [K59.04]  Esophageal dysmotility [K22.4]  Screening for colorectal cancer [Z12.11, Z12.12]  BMI 45.0-49.9, adult ([address] [Z68.42]    Postoperative diagnosis: Esophageal dysphagia [R13.19]  Chronic idiopathic constipation [K59.04]  Esophageal dysmotility [K22.4]  Screening for colorectal cancer [Z12.11, Z12.12]  BMI 45.0-49.9, adult ([address] [Z68.42]    Type of Anesthesia: MAC (Monitored anesthesia care)    Description of findings: same as post op dx    Procedure: Procedure(s):  EGD ESOPHAGOGASTRODUODENOSCOPY DILATION  COLONOSCOPY    :  Dr. Greg Ward MD    Assistant(s): Circulator: Alvina Ford RN  Endoscopy Technician: Silva Fontana    Devices/implants/grafts/tissues/prosthesis: None    EBL:None    Specimens: * No specimens in log *    Findings: See printed and scanned procedure note    Complications: None    Dr. Greg Ward MD  4/25/2023  12:51 PM

## 2023-05-17 DIAGNOSIS — G47.62 SLEEP RELATED LEG CRAMPS: ICD-10-CM

## 2023-05-17 DIAGNOSIS — E83.42 HYPOMAGNESEMIA: ICD-10-CM

## 2023-05-17 NOTE — TELEPHONE ENCOUNTER
Last Visit: 02- OV   Next Appointment: 06-  Previous Refill Encounter: 07- #90 tabs with 1 refills  Benicar on 01- # 90 tabs with 1 refill     Aspirin on 10- # 90 tabs with 1 refill     Requested Prescriptions     Pending Prescriptions Disp Refills    olmesartan-hydroCHLOROthiazide (BENICAR HCT) 20-12.5 MG per tablet [Pharmacy Med Name: OLMESARTAN-HCTZ 20-12.5 MG TAB] 90 tablet 1     Sig: TAKE 1 TABLET BY MOUTH EVERY DAY    magnesium oxide (MAG-OX) 400 (240 Mg) MG tablet [Pharmacy Med Name: MAGNESIUM OXIDE 400 MG TABLET] 90 tablet 1     Sig: TAKE 1 TABLET BY MOUTH EVERY DAY    aspirin 81 MG EC tablet [Pharmacy Med Name: [facility] ASPIRIN EC 81 MG TABLET] 90 tablet 1     Sig: TAKE 1 TABLET BY MOUTH EVERY DAY

## 2023-05-19 RX ORDER — ASPIRIN 81 MG/1
TABLET ORAL
Qty: 90 TABLET | Refills: 1 | Status: SHIPPED | OUTPATIENT
Start: 2023-05-19

## 2023-05-19 RX ORDER — OLMESARTAN MEDOXOMIL AND HYDROCHLOROTHIAZIDE 20/12.5 20; 12.5 MG/1; MG/1
TABLET ORAL
Qty: 90 TABLET | Refills: 1 | Status: SHIPPED | OUTPATIENT
Start: 2023-05-19

## 2023-05-19 RX ORDER — LANOLIN ALCOHOL/MO/W.PET/CERES
CREAM (GRAM) TOPICAL
Qty: 90 TABLET | Refills: 1 | Status: SHIPPED | OUTPATIENT
Start: 2023-05-19

## 2023-07-08 DIAGNOSIS — G47.62 SLEEP RELATED LEG CRAMPS: ICD-10-CM

## 2023-07-08 DIAGNOSIS — E83.42 HYPOMAGNESEMIA: ICD-10-CM

## 2023-07-10 RX ORDER — OLMESARTAN MEDOXOMIL AND HYDROCHLOROTHIAZIDE 20/12.5 20; 12.5 MG/1; MG/1
1 TABLET ORAL DAILY
Qty: 90 TABLET | Refills: 1 | OUTPATIENT
Start: 2023-07-10

## 2023-07-10 RX ORDER — LANOLIN ALCOHOL/MO/W.PET/CERES
400 CREAM (GRAM) TOPICAL DAILY
Qty: 90 TABLET | Refills: 1 | OUTPATIENT
Start: 2023-07-10

## 2023-07-10 RX ORDER — ASPIRIN 81 MG/1
81 TABLET ORAL DAILY
Qty: 90 TABLET | Refills: 1 | OUTPATIENT
Start: 2023-07-10

## 2023-07-14 DIAGNOSIS — E83.42 HYPOMAGNESEMIA: ICD-10-CM

## 2023-07-14 DIAGNOSIS — G47.62 SLEEP RELATED LEG CRAMPS: ICD-10-CM

## 2023-07-17 RX ORDER — LANOLIN ALCOHOL/MO/W.PET/CERES
400 CREAM (GRAM) TOPICAL DAILY
Qty: 90 TABLET | Refills: 1 | OUTPATIENT
Start: 2023-07-17

## 2023-07-17 RX ORDER — OLMESARTAN MEDOXOMIL AND HYDROCHLOROTHIAZIDE 20/12.5 20; 12.5 MG/1; MG/1
1 TABLET ORAL DAILY
Qty: 90 TABLET | Refills: 1 | OUTPATIENT
Start: 2023-07-17

## 2023-07-17 RX ORDER — ASPIRIN 81 MG/1
81 TABLET ORAL DAILY
Qty: 90 TABLET | Refills: 1 | OUTPATIENT
Start: 2023-07-17

## 2023-07-18 ENCOUNTER — OFFICE VISIT (OUTPATIENT)
Age: 53
End: 2023-07-18
Payer: COMMERCIAL

## 2023-07-18 VITALS
HEART RATE: 60 BPM | HEIGHT: 63 IN | SYSTOLIC BLOOD PRESSURE: 130 MMHG | DIASTOLIC BLOOD PRESSURE: 82 MMHG | OXYGEN SATURATION: 98 % | BODY MASS INDEX: 47.31 KG/M2 | WEIGHT: 267 LBS

## 2023-07-18 DIAGNOSIS — I25.10 ATHEROSCLEROSIS OF NATIVE CORONARY ARTERY OF NATIVE HEART WITHOUT ANGINA PECTORIS: Primary | ICD-10-CM

## 2023-07-18 DIAGNOSIS — I24.9 ACUTE ISCHEMIC HEART DISEASE, UNSPECIFIED (HCC): ICD-10-CM

## 2023-07-18 DIAGNOSIS — R07.9 CHEST PAIN, UNSPECIFIED TYPE: ICD-10-CM

## 2023-07-18 PROCEDURE — 3079F DIAST BP 80-89 MM HG: CPT | Performed by: INTERNAL MEDICINE

## 2023-07-18 PROCEDURE — 93000 ELECTROCARDIOGRAM COMPLETE: CPT | Performed by: INTERNAL MEDICINE

## 2023-07-18 PROCEDURE — 3075F SYST BP GE 130 - 139MM HG: CPT | Performed by: INTERNAL MEDICINE

## 2023-07-18 PROCEDURE — 99214 OFFICE O/P EST MOD 30 MIN: CPT | Performed by: INTERNAL MEDICINE

## 2023-07-18 ASSESSMENT — ANXIETY QUESTIONNAIRES
4. TROUBLE RELAXING: 0
5. BEING SO RESTLESS THAT IT IS HARD TO SIT STILL: 0
6. BECOMING EASILY ANNOYED OR IRRITABLE: 0
7. FEELING AFRAID AS IF SOMETHING AWFUL MIGHT HAPPEN: 0
GAD7 TOTAL SCORE: 0
1. FEELING NERVOUS, ANXIOUS, OR ON EDGE: 0
2. NOT BEING ABLE TO STOP OR CONTROL WORRYING: 0
3. WORRYING TOO MUCH ABOUT DIFFERENT THINGS: 0

## 2023-07-18 ASSESSMENT — PATIENT HEALTH QUESTIONNAIRE - PHQ9
1. LITTLE INTEREST OR PLEASURE IN DOING THINGS: 0
SUM OF ALL RESPONSES TO PHQ QUESTIONS 1-9: 0
2. FEELING DOWN, DEPRESSED OR HOPELESS: 0
SUM OF ALL RESPONSES TO PHQ QUESTIONS 1-9: 0
SUM OF ALL RESPONSES TO PHQ9 QUESTIONS 1 & 2: 0

## 2023-07-18 NOTE — PROGRESS NOTES
Javier De Leon presents today for   Chief Complaint   Patient presents with    Follow-up     1 year       Javier De Leon preferred language for health care discussion is english/other. Is someone accompanying this pt? no    Is the patient using any DME equipment during OV? no    Depression Screening:  Depression: Not at risk    PHQ-2 Score: 0        Learning Assessment:  Who is the primary learner? Patient    What is the preferred language for health care of the primary learner? ENGLISH    How does the primary learner prefer to learn new concepts? DEMONSTRATION    Answered By patient    Relationship to Learner SELF           Pt currently taking Anticoagulant therapy? no    Pt currently taking Antiplatelet therapy ? Aspirin 81 mg daily      Coordination of Care:  1. Have you been to the ER, urgent care clinic since your last visit? Hospitalized since your last visit? no    2. Have you seen or consulted any other health care providers outside of the [address] [place] [address] since your last visit? Include any pap smears or colon screening.  no
tablet by mouth daily      DULoxetine (CYMBALTA) 60 MG extended release capsule Take 1 capsule by mouth daily      gabapentin (NEURONTIN) 300 MG capsule Take 1 capsule by mouth 3 times daily. lidocaine (LIDODERM) 5 % Place 1 patch onto the skin as needed for Pain      magnesium oxide (MAG-OX) 400 MG tablet TAKE 1 TABLET BY MOUTH EVERY DAY      metoprolol tartrate (LOPRESSOR) 25 MG tablet Take 0.5 tablets by mouth 2 times daily      mometasone (NASONEX) 50 MCG/ACT nasal spray 2 sprays by Nasal route daily      nitroGLYCERIN (NITROSTAT) 0.4 MG SL tablet Place 1 tablet under the tongue every 5 minutes as needed for Chest pain      omeprazole (PRILOSEC OTC) 20 MG tablet Take 1 tablet by mouth daily       No current facility-administered medications for this visit. The patient has a family history of    Social History     Tobacco Use    Smoking status: Never    Smokeless tobacco: Never   Vaping Use    Vaping Use: Never used   Substance Use Topics    Alcohol use: No     Alcohol/week: 1.0 standard drink    Drug use: No       Lab Results   Component Value Date/Time    CHOL 185 07/13/2022 08:26 AM    HDL 72 07/13/2022 08:26 AM        BP Readings from Last 3 Encounters:   07/18/23 130/82   04/25/23 118/76   02/02/23 111/73        Pulse Readings from Last 3 Encounters:   07/18/23 60   04/25/23 76   02/02/23 64       Wt Readings from Last 3 Encounters:   07/18/23 267 lb (121.1 kg)   04/25/23 258 lb 6 oz (117.2 kg)   02/02/23 267 lb 4.8 oz (121.2 kg)         EKG: normal sinus rhythm, nonspecific ST and T waves changes, Q waves in leads III, aVF, and V1, unchanged from previous tracings.      Cristian Camara MD   7/18/2023

## 2023-07-28 DIAGNOSIS — G50.0 TRIGEMINAL NEURALGIA: ICD-10-CM

## 2023-08-04 RX ORDER — CARBAMAZEPINE 200 MG/1
TABLET ORAL
Qty: 60 TABLET | Refills: 0 | Status: SHIPPED | OUTPATIENT
Start: 2023-08-04

## 2023-08-07 NOTE — TELEPHONE ENCOUNTER
Last appointment: 02/02/2023    Next appointment: 08/29/2023    Patient requesting refill for     magnesium oxide (MAG-OX) 400 MG tablet      Pharmacy  [facility]   [facility],[place] [id number]   Phone:  [phone]  Fax:  [phone]

## 2023-08-07 NOTE — TELEPHONE ENCOUNTER
Requested Prescriptions     Pending Prescriptions Disp Refills    magnesium oxide (MAG-OX) 400 MG tablet 30 tablet 0     Sig: Take 1 tablet by mouth daily

## 2023-08-09 RX ORDER — MAGNESIUM OXIDE 400 MG/1
1 TABLET ORAL DAILY
Qty: 30 TABLET | Refills: 0 | Status: SHIPPED | OUTPATIENT
Start: 2023-08-09

## 2023-08-29 ENCOUNTER — OFFICE VISIT (OUTPATIENT)
Age: 53
End: 2023-08-29
Payer: COMMERCIAL

## 2023-08-29 VITALS
RESPIRATION RATE: 18 BRPM | OXYGEN SATURATION: 96 % | TEMPERATURE: 97.5 F | DIASTOLIC BLOOD PRESSURE: 84 MMHG | WEIGHT: 262 LBS | HEIGHT: 63 IN | SYSTOLIC BLOOD PRESSURE: 122 MMHG | BODY MASS INDEX: 46.42 KG/M2 | HEART RATE: 63 BPM

## 2023-08-29 DIAGNOSIS — E11.65 TYPE 2 DIABETES MELLITUS WITH HYPERGLYCEMIA, WITHOUT LONG-TERM CURRENT USE OF INSULIN (HCC): ICD-10-CM

## 2023-08-29 DIAGNOSIS — I10 ESSENTIAL (PRIMARY) HYPERTENSION: Primary | ICD-10-CM

## 2023-08-29 PROCEDURE — 3074F SYST BP LT 130 MM HG: CPT | Performed by: FAMILY MEDICINE

## 2023-08-29 PROCEDURE — 3078F DIAST BP <80 MM HG: CPT | Performed by: FAMILY MEDICINE

## 2023-08-29 PROCEDURE — 99214 OFFICE O/P EST MOD 30 MIN: CPT | Performed by: FAMILY MEDICINE

## 2023-08-29 RX ORDER — MAGNESIUM OXIDE 400 MG/1
1 TABLET ORAL DAILY
Qty: 90 TABLET | Refills: 3 | Status: SHIPPED | OUTPATIENT
Start: 2023-08-29

## 2023-08-29 RX ORDER — GABAPENTIN 600 MG/1
TABLET ORAL
COMMUNITY
Start: 2023-07-27

## 2023-08-29 RX ORDER — PANTOPRAZOLE SODIUM 40 MG/1
40 TABLET, DELAYED RELEASE ORAL
COMMUNITY
Start: 2023-08-03

## 2023-08-29 RX ORDER — SEMAGLUTIDE 1.34 MG/ML
1 INJECTION, SOLUTION SUBCUTANEOUS WEEKLY
Qty: 3 ML | Refills: 0 | Status: SHIPPED | OUTPATIENT
Start: 2023-08-29 | End: 2023-09-28

## 2023-08-29 SDOH — ECONOMIC STABILITY: HOUSING INSECURITY
IN THE LAST 12 MONTHS, WAS THERE A TIME WHEN YOU DID NOT HAVE A STEADY PLACE TO SLEEP OR SLEPT IN A SHELTER (INCLUDING NOW)?: NO

## 2023-08-29 SDOH — ECONOMIC STABILITY: TRANSPORTATION INSECURITY
IN THE PAST 12 MONTHS, HAS LACK OF TRANSPORTATION KEPT YOU FROM MEETINGS, WORK, OR FROM GETTING THINGS NEEDED FOR DAILY LIVING?: NO

## 2023-08-29 SDOH — ECONOMIC STABILITY: INCOME INSECURITY: HOW HARD IS IT FOR YOU TO PAY FOR THE VERY BASICS LIKE FOOD, HOUSING, MEDICAL CARE, AND HEATING?: HARD

## 2023-08-29 SDOH — ECONOMIC STABILITY: FOOD INSECURITY: WITHIN THE PAST 12 MONTHS, YOU WORRIED THAT YOUR FOOD WOULD RUN OUT BEFORE YOU GOT MONEY TO BUY MORE.: OFTEN TRUE

## 2023-08-29 SDOH — ECONOMIC STABILITY: FOOD INSECURITY: WITHIN THE PAST 12 MONTHS, THE FOOD YOU BOUGHT JUST DIDN'T LAST AND YOU DIDN'T HAVE MONEY TO GET MORE.: NEVER TRUE

## 2023-08-29 NOTE — PROGRESS NOTES
HPI:  Tesfaye Noonan is a 46 y.o. female who presents today with   Chief Complaint   Patient presents with    Hypertension     Follow-up      Patient has high blood pressure. Her blood pressures have been stable. She is on medications as listed below and tolerates her medications well. She is in need of refills. Patient also has diabetes. She is currently on Ozempic. She has tolerated Ozempic without any difficulty. She has lost weight. Her eye examination was done in February. Wt Readings from Last 3 Encounters:   08/29/23 262 lb (118.8 kg)   07/18/23 267 lb (121.1 kg)   04/25/23 258 lb 6 oz (117.2 kg)     Has a toenail that has been sore. She would like to see podiatry for her toe pain. PMH,  Meds, Allergies, Family History, Social history reviewed      Current Outpatient Medications   Medication Sig Dispense Refill    pantoprazole (PROTONIX) 40 MG tablet Take 1 tablet by mouth every morning (before breakfast)      hyoscyamine (LEVSIN/SL) 125 MCG sublingual tablet TAKE 1 UNDER THE TONGUE EVERY 4-6 HOURS AS NEEDED FOR ABDOMINAL PAIN OR CRAMPING      gabapentin (NEURONTIN) 600 MG tablet       magnesium oxide (MAG-OX) 400 MG tablet Take 1 tablet by mouth daily 30 tablet 0    carBAMazepine (TEGRETOL) 200 MG tablet TAKE 1 TABLET BY MOUTH TWO TIMES A DAY.  60 tablet 0    metoprolol tartrate (LOPRESSOR) 25 MG tablet TAKE 0.5 TABLETS BY MOUTH TWICE A DAY 90 tablet 3    Multiple Vitamins-Minerals (ONE-A-DAY WOMENS PO) Take 1 tablet by mouth daily      olmesartan-hydroCHLOROthiazide (BENICAR HCT) 20-12.5 MG per tablet TAKE 1 TABLET BY MOUTH EVERY DAY 90 tablet 1    aspirin 81 MG EC tablet TAKE 1 TABLET BY MOUTH EVERY DAY 90 tablet 1    OZEMPIC, 0.25 OR 0.5 MG/DOSE, 2 MG/1.5ML SOPN INJECT 0.25 MG SUBCUTANEOUSLY EVERY 7 DAYS      polyethylene glycol ([facility] PURELAX) 17 g packet Take 17 g by mouth daily as needed for Constipation 30 each 1    atorvastatin (LIPITOR) 20 MG tablet Take 1 tablet by

## 2023-08-29 NOTE — PROGRESS NOTES
1. \"Have you been to the ER, urgent care clinic since your last visit? Hospitalized since your last visit? \" No    2. \"Have you seen or consulted any other health care providers outside of the [address] [place] [address] since your last visit? \" No     3. For patients aged 43-73: Has the patient had a colonoscopy / FIT/ Cologuard? Yes - Care Gap present. Most recent result on file      If the patient is female:    4. For patients aged 43-66: Has the patient had a mammogram within the past 2 years? Yes - Care Gap present. Most recent result on file      5. For patients aged 21-65: Has the patient had a pap smear? Yes - Care Gap present.  Most recent result on file

## 2023-10-10 NOTE — TELEPHONE ENCOUNTER
Last Visit: 08/29/2023   Next Appointment: 01/03/2024    Requested Prescriptions     Pending Prescriptions Disp Refills    olmesartan-hydroCHLOROthiazide (BENICAR HCT) 20-12.5 MG per tablet 90 tablet 1     Sig: Take 1 tablet by mouth daily    aspirin 81 MG EC tablet 90 tablet 1     Sig: Take 1 tablet by mouth daily

## 2023-10-13 RX ORDER — ASPIRIN 81 MG/1
81 TABLET ORAL DAILY
Qty: 90 TABLET | Refills: 1 | OUTPATIENT
Start: 2023-10-13

## 2023-10-13 RX ORDER — OLMESARTAN MEDOXOMIL AND HYDROCHLOROTHIAZIDE 20/12.5 20; 12.5 MG/1; MG/1
1 TABLET ORAL DAILY
Qty: 90 TABLET | Refills: 1 | OUTPATIENT
Start: 2023-10-13

## 2023-10-15 RX ORDER — ASPIRIN 81 MG/1
81 TABLET ORAL DAILY
Qty: 90 TABLET | Refills: 1 | Status: SHIPPED | OUTPATIENT
Start: 2023-10-15

## 2023-10-15 RX ORDER — OLMESARTAN MEDOXOMIL AND HYDROCHLOROTHIAZIDE 20/12.5 20; 12.5 MG/1; MG/1
1 TABLET ORAL DAILY
Qty: 90 TABLET | Refills: 1 | Status: SHIPPED | OUTPATIENT
Start: 2023-10-15

## 2023-10-16 ENCOUNTER — TELEMEDICINE (OUTPATIENT)
Age: 53
End: 2023-10-16
Payer: COMMERCIAL

## 2023-10-16 DIAGNOSIS — U07.1 COVID-19: Primary | ICD-10-CM

## 2023-10-16 PROCEDURE — 99214 OFFICE O/P EST MOD 30 MIN: CPT | Performed by: FAMILY MEDICINE

## 2023-10-16 NOTE — PROGRESS NOTES
Tesfaye Noonan, was evaluated through a synchronous (real-time) audio-video encounter. The patient (or guardian if applicable) is aware that this is a billable service, which includes applicable co-pays. This Virtual Visit was conducted with patient's (and/or legal guardian's) consent. Patient identification was verified, and a caregiver was present when appropriate.    The patient was located at Home: [employer] [id number]  Provider was located at [facility] (Appt Dept): [facility], [address] [place] [address],  [address] [place] [address]      Tesfaye Noonan (:  1970) is a Established patient, presenting virtually for evaluation of the following:        --Anselmo Hills MA

## 2023-10-16 NOTE — PROGRESS NOTES
10/16/2023    TELEHEALTH EVALUATION -- Audio/Visual    HPI:    Josh Carvalho (:  1970) has requested an audio/video evaluation for the following concern(s):    Pt developed nasal congestion that was progressive and eventually started having a cough. She took a home COVID test and was positive. Symptoms began . She still has a cough and has significant nasal congestion. Patient is not a candidate for Paxlovid due to Tegretol use. Review of Systems as per HPI    Prior to Visit Medications    Medication Sig Taking? Authorizing Provider   olmesartan-hydroCHLOROthiazide (BENICAR HCT) 20-12.5 MG per tablet Take 1 tablet by mouth daily Yes Unruly Herbert MD   aspirin 81 MG EC tablet Take 1 tablet by mouth daily Yes Unruly Herbert MD   pantoprazole (PROTONIX) 40 MG tablet Take 1 tablet by mouth every morning (before breakfast) Yes Provider, MD Indira   hyoscyamine (LEVSIN/SL) 125 MCG sublingual tablet TAKE 1 UNDER THE TONGUE EVERY 4-6 HOURS AS NEEDED FOR ABDOMINAL PAIN OR CRAMPING Yes Provider, MD Indira   gabapentin (NEURONTIN) 600 MG tablet  Yes Provider, MD Indira   magnesium oxide (MAG-OX) 400 MG tablet Take 1 tablet by mouth daily Yes Unruly Herbert MD   Semaglutide, 2 MG/DOSE, 8 MG/3ML SOPN Inject 2 mg into the skin once a week Yes Unruly Herbert MD   carBAMazepine (TEGRETOL) 200 MG tablet TAKE 1 TABLET BY MOUTH TWO TIMES A DAY.  Yes TOMASA Engle NP   metoprolol tartrate (LOPRESSOR) 25 MG tablet TAKE 0.5 TABLETS BY MOUTH TWICE A DAY Yes TOMASA Teague NP   Multiple Vitamins-Minerals (ONE-A-DAY WOMENS PO) Take 1 tablet by mouth daily Yes ProviderIndira MD   atorvastatin (LIPITOR) 20 MG tablet Take 1 tablet by mouth daily Yes Automatic Reconciliation, Ar   DULoxetine (CYMBALTA) 60 MG extended release capsule Take 1 capsule by mouth daily Yes Automatic Reconciliation, Ar   lidocaine (LIDODERM) 5 % Place 1 patch onto the skin as needed

## 2023-10-18 DIAGNOSIS — G50.0 TRIGEMINAL NEURALGIA: ICD-10-CM

## 2023-10-18 NOTE — TELEPHONE ENCOUNTER
Last Visit: 10- VV   Next Appointment: 01-  Previous Refill Encounter: 08- #60tabs with 0 refills      Requested Prescriptions     Pending Prescriptions Disp Refills    carBAMazepine (TEGRETOL) 200 MG tablet 60 tablet 0     Sig: Take 1 tablet by mouth 2 times daily

## 2023-10-23 RX ORDER — SEMAGLUTIDE 1.34 MG/ML
INJECTION, SOLUTION SUBCUTANEOUS
OUTPATIENT
Start: 2023-10-23

## 2023-10-25 RX ORDER — CARBAMAZEPINE 200 MG/1
200 TABLET ORAL 2 TIMES DAILY
Qty: 60 TABLET | Refills: 0 | Status: SHIPPED | OUTPATIENT
Start: 2023-10-25

## 2023-11-01 ENCOUNTER — OFFICE VISIT (OUTPATIENT)
Age: 53
End: 2023-11-01
Payer: COMMERCIAL

## 2023-11-01 VITALS
OXYGEN SATURATION: 96 % | DIASTOLIC BLOOD PRESSURE: 83 MMHG | RESPIRATION RATE: 18 BRPM | HEART RATE: 69 BPM | TEMPERATURE: 97.5 F | HEIGHT: 63 IN | SYSTOLIC BLOOD PRESSURE: 114 MMHG | BODY MASS INDEX: 46 KG/M2 | WEIGHT: 259.6 LBS

## 2023-11-01 DIAGNOSIS — R21 RASH: Primary | ICD-10-CM

## 2023-11-01 PROCEDURE — 3078F DIAST BP <80 MM HG: CPT | Performed by: NURSE PRACTITIONER

## 2023-11-01 PROCEDURE — 3074F SYST BP LT 130 MM HG: CPT | Performed by: NURSE PRACTITIONER

## 2023-11-01 PROCEDURE — 99214 OFFICE O/P EST MOD 30 MIN: CPT | Performed by: NURSE PRACTITIONER

## 2023-11-01 RX ORDER — CLOTRIMAZOLE AND BETAMETHASONE DIPROPIONATE 10; .64 MG/G; MG/G
CREAM TOPICAL
Qty: 45 G | Refills: 0 | Status: SHIPPED | OUTPATIENT
Start: 2023-11-01

## 2023-11-01 ASSESSMENT — PATIENT HEALTH QUESTIONNAIRE - PHQ9
SUM OF ALL RESPONSES TO PHQ QUESTIONS 1-9: 8
7. TROUBLE CONCENTRATING ON THINGS, SUCH AS READING THE NEWSPAPER OR WATCHING TELEVISION: 0
5. POOR APPETITE OR OVEREATING: 0
8. MOVING OR SPEAKING SO SLOWLY THAT OTHER PEOPLE COULD HAVE NOTICED. OR THE OPPOSITE, BEING SO FIGETY OR RESTLESS THAT YOU HAVE BEEN MOVING AROUND A LOT MORE THAN USUAL: 0
2. FEELING DOWN, DEPRESSED OR HOPELESS: 2
SUM OF ALL RESPONSES TO PHQ QUESTIONS 1-9: 8
1. LITTLE INTEREST OR PLEASURE IN DOING THINGS: 2
6. FEELING BAD ABOUT YOURSELF - OR THAT YOU ARE A FAILURE OR HAVE LET YOURSELF OR YOUR FAMILY DOWN: 1
SUM OF ALL RESPONSES TO PHQ9 QUESTIONS 1 & 2: 4
SUM OF ALL RESPONSES TO PHQ QUESTIONS 1-9: 8
SUM OF ALL RESPONSES TO PHQ QUESTIONS 1-9: 8
9. THOUGHTS THAT YOU WOULD BE BETTER OFF DEAD, OR OF HURTING YOURSELF: 0
3. TROUBLE FALLING OR STAYING ASLEEP: 0
4. FEELING TIRED OR HAVING LITTLE ENERGY: 3

## 2023-11-01 NOTE — PROGRESS NOTES
1. \"Have you been to the ER, urgent care clinic since your last visit? Hospitalized since your last visit? \" No    2. \"Have you seen or consulted any other health care providers outside of the [address] [place] [address] since your last visit? \" No     3. For patients aged 43-73: Has the patient had a colonoscopy / FIT/ Cologuard? Yes - no Care Gap present      If the patient is female:    4. For patients aged 43-66: Has the patient had a mammogram within the past 2 years? Yes - no Care Gap present      5. For patients aged 21-65: Has the patient had a pap smear?  Yes - no Care Gap present

## 2023-11-10 ENCOUNTER — HOSPITAL ENCOUNTER (OUTPATIENT)
Facility: HOSPITAL | Age: 53
Discharge: HOME | End: 2023-11-10
Payer: COMMERCIAL

## 2023-11-10 DIAGNOSIS — E11.65 TYPE 2 DIABETES MELLITUS WITH HYPERGLYCEMIA, WITHOUT LONG-TERM CURRENT USE OF INSULIN (HCC): ICD-10-CM

## 2023-11-10 LAB
ALBUMIN SERPL-MCNC: 3.9 G/DL (ref 3.4–5)
ALBUMIN/GLOB SERPL: 1.1 (ref 0.8–1.7)
ALP SERPL-CCNC: 234 U/L (ref 45–117)
ALT SERPL-CCNC: 54 U/L (ref 13–56)
ANION GAP SERPL CALC-SCNC: 5 MMOL/L (ref 3–18)
AST SERPL-CCNC: 46 U/L (ref 10–38)
BILIRUB SERPL-MCNC: 0.4 MG/DL (ref 0.2–1)
BUN SERPL-MCNC: 16 MG/DL (ref 7–18)
BUN/CREAT SERPL: 20 (ref 12–20)
CALCIUM SERPL-MCNC: 9.2 MG/DL (ref 8.5–10.1)
CHLORIDE SERPL-SCNC: 102 MMOL/L (ref 100–111)
CHOLEST SERPL-MCNC: 198 MG/DL
CO2 SERPL-SCNC: 32 MMOL/L (ref 21–32)
CREAT SERPL-MCNC: 0.8 MG/DL (ref 0.6–1.3)
CREAT UR-MCNC: 181 MG/DL (ref 30–125)
EST. AVERAGE GLUCOSE BLD GHB EST-MCNC: 126 MG/DL
GLOBULIN SER CALC-MCNC: 3.5 G/DL (ref 2–4)
GLUCOSE SERPL-MCNC: 85 MG/DL (ref 74–99)
HBA1C MFR BLD: 6 % (ref 4.2–5.6)
HDLC SERPL-MCNC: 86 MG/DL (ref 40–60)
HDLC SERPL: 2.3 (ref 0–5)
LDLC SERPL CALC-MCNC: 100 MG/DL (ref 0–100)
LIPID PANEL: ABNORMAL
MICROALBUMIN UR-MCNC: 1.94 MG/DL (ref 0–3)
MICROALBUMIN/CREAT UR-RTO: 11 MG/G (ref 0–30)
POTASSIUM SERPL-SCNC: 4.4 MMOL/L (ref 3.5–5.5)
PROT SERPL-MCNC: 7.4 G/DL (ref 6.4–8.2)
SODIUM SERPL-SCNC: 139 MMOL/L (ref 136–145)
TRIGL SERPL-MCNC: 60 MG/DL
TSH SERPL DL<=0.05 MIU/L-ACNC: 0.65 UIU/ML (ref 0.36–3.74)
VLDLC SERPL CALC-MCNC: 12 MG/DL

## 2023-11-10 PROCEDURE — 83036 HEMOGLOBIN GLYCOSYLATED A1C: CPT

## 2023-11-10 PROCEDURE — 82043 UR ALBUMIN QUANTITATIVE: CPT

## 2023-11-10 PROCEDURE — 80053 COMPREHEN METABOLIC PANEL: CPT

## 2023-11-10 PROCEDURE — 82570 ASSAY OF URINE CREATININE: CPT

## 2023-11-10 PROCEDURE — 80061 LIPID PANEL: CPT

## 2023-11-10 PROCEDURE — 36415 COLL VENOUS BLD VENIPUNCTURE: CPT

## 2023-11-10 PROCEDURE — 84443 ASSAY THYROID STIM HORMONE: CPT

## 2023-11-17 DIAGNOSIS — G50.0 TRIGEMINAL NEURALGIA: ICD-10-CM

## 2023-11-17 NOTE — TELEPHONE ENCOUNTER
Last appointment: 11/01/2023    Next appointment: 0/03/2024    Pharmacy requesting 90 day supply for     carBAMazepine (TEGRETOL) 200 MG tablet [[id number]]       Pharmacy   [facility]/pharmacy [address] [phone] (Pharmacy)

## 2023-11-22 NOTE — TELEPHONE ENCOUNTER
Requested Prescriptions     Pending Prescriptions Disp Refills    carBAMazepine (TEGRETOL) 200 MG tablet 60 tablet 0     Sig: Take 1 tablet by mouth 2 times daily

## 2023-11-25 RX ORDER — CARBAMAZEPINE 200 MG/1
200 TABLET ORAL 2 TIMES DAILY
Qty: 60 TABLET | Refills: 3 | Status: SHIPPED | OUTPATIENT
Start: 2023-11-25

## 2023-12-04 ENCOUNTER — HOSPITAL ENCOUNTER (EMERGENCY)
Facility: HOSPITAL | Age: 53
Discharge: HOME | End: 2023-12-04
Attending: EMERGENCY MEDICINE
Payer: COMMERCIAL

## 2023-12-04 ENCOUNTER — APPOINTMENT (OUTPATIENT)
Facility: HOSPITAL | Age: 53
End: 2023-12-04
Payer: COMMERCIAL

## 2023-12-04 VITALS
TEMPERATURE: 97.9 F | BODY MASS INDEX: 46.95 KG/M2 | WEIGHT: 265 LBS | SYSTOLIC BLOOD PRESSURE: 129 MMHG | OXYGEN SATURATION: 98 % | RESPIRATION RATE: 18 BRPM | HEIGHT: 63 IN | HEART RATE: 61 BPM | DIASTOLIC BLOOD PRESSURE: 82 MMHG

## 2023-12-04 DIAGNOSIS — R07.9 CHEST PAIN, UNSPECIFIED TYPE: Primary | ICD-10-CM

## 2023-12-04 LAB
ALBUMIN SERPL-MCNC: 3.7 G/DL (ref 3.4–5)
ALBUMIN/GLOB SERPL: 1 (ref 0.8–1.7)
ALP SERPL-CCNC: 276 U/L (ref 45–117)
ALT SERPL-CCNC: 122 U/L (ref 13–56)
ANION GAP SERPL CALC-SCNC: 5 MMOL/L (ref 3–18)
AST SERPL-CCNC: 52 U/L (ref 10–38)
BASOPHILS # BLD: 0 K/UL (ref 0–0.1)
BASOPHILS NFR BLD: 0 % (ref 0–2)
BILIRUB SERPL-MCNC: 0.4 MG/DL (ref 0.2–1)
BUN SERPL-MCNC: 17 MG/DL (ref 7–18)
BUN/CREAT SERPL: 20 (ref 12–20)
CALCIUM SERPL-MCNC: 9 MG/DL (ref 8.5–10.1)
CHLORIDE SERPL-SCNC: 104 MMOL/L (ref 100–111)
CO2 SERPL-SCNC: 31 MMOL/L (ref 21–32)
CREAT SERPL-MCNC: 0.86 MG/DL (ref 0.6–1.3)
DIFFERENTIAL METHOD BLD: NORMAL
EKG ATRIAL RATE: 69 BPM
EKG DIAGNOSIS: NORMAL
EKG P AXIS: 27 DEGREES
EKG P-R INTERVAL: 144 MS
EKG Q-T INTERVAL: 370 MS
EKG QRS DURATION: 78 MS
EKG QTC CALCULATION (BAZETT): 396 MS
EKG R AXIS: 61 DEGREES
EKG T AXIS: 164 DEGREES
EKG VENTRICULAR RATE: 69 BPM
EOSINOPHIL # BLD: 0.1 K/UL (ref 0–0.4)
EOSINOPHIL NFR BLD: 1 % (ref 0–5)
ERYTHROCYTE [DISTWIDTH] IN BLOOD BY AUTOMATED COUNT: 14.2 % (ref 11.6–14.5)
GLOBULIN SER CALC-MCNC: 3.7 G/DL (ref 2–4)
GLUCOSE SERPL-MCNC: 103 MG/DL (ref 74–99)
HCT VFR BLD AUTO: 38.3 % (ref 35–45)
HGB BLD-MCNC: 12.8 G/DL (ref 12–16)
IMM GRANULOCYTES # BLD AUTO: 0 K/UL (ref 0–0.04)
IMM GRANULOCYTES NFR BLD AUTO: 0 % (ref 0–0.5)
LYMPHOCYTES # BLD: 1.7 K/UL (ref 0.9–3.6)
LYMPHOCYTES NFR BLD: 26 % (ref 21–52)
MCH RBC QN AUTO: 29.5 PG (ref 24–34)
MCHC RBC AUTO-ENTMCNC: 33.4 G/DL (ref 31–37)
MCV RBC AUTO: 88.2 FL (ref 78–100)
MONOCYTES # BLD: 0.4 K/UL (ref 0.05–1.2)
MONOCYTES NFR BLD: 6 % (ref 3–10)
NEUTS SEG # BLD: 4.4 K/UL (ref 1.8–8)
NEUTS SEG NFR BLD: 68 % (ref 40–73)
NRBC # BLD: 0 K/UL (ref 0–0.01)
NRBC BLD-RTO: 0 PER 100 WBC
NT PRO BNP: 96 PG/ML (ref 0–900)
PLATELET # BLD AUTO: 157 K/UL (ref 135–420)
PMV BLD AUTO: 11.7 FL (ref 9.2–11.8)
POTASSIUM SERPL-SCNC: 3.8 MMOL/L (ref 3.5–5.5)
PROT SERPL-MCNC: 7.4 G/DL (ref 6.4–8.2)
RBC # BLD AUTO: 4.34 M/UL (ref 4.2–5.3)
SODIUM SERPL-SCNC: 140 MMOL/L (ref 136–145)
TROPONIN I SERPL HS-MCNC: 4 NG/L (ref 0–54)
TROPONIN I SERPL HS-MCNC: 5 NG/L (ref 0–54)
WBC # BLD AUTO: 6.6 K/UL (ref 4.6–13.2)

## 2023-12-04 PROCEDURE — 83880 ASSAY OF NATRIURETIC PEPTIDE: CPT

## 2023-12-04 PROCEDURE — 80053 COMPREHEN METABOLIC PANEL: CPT

## 2023-12-04 PROCEDURE — 84484 ASSAY OF TROPONIN QUANT: CPT

## 2023-12-04 PROCEDURE — 93005 ELECTROCARDIOGRAM TRACING: CPT | Performed by: EMERGENCY MEDICINE

## 2023-12-04 PROCEDURE — 6370000000 HC RX 637 (ALT 250 FOR IP): Performed by: EMERGENCY MEDICINE

## 2023-12-04 PROCEDURE — 99285 EMERGENCY DEPT VISIT HI MDM: CPT

## 2023-12-04 PROCEDURE — 71045 X-RAY EXAM CHEST 1 VIEW: CPT

## 2023-12-04 PROCEDURE — 93010 ELECTROCARDIOGRAM REPORT: CPT | Performed by: INTERNAL MEDICINE

## 2023-12-04 PROCEDURE — 85025 COMPLETE CBC W/AUTO DIFF WBC: CPT

## 2023-12-04 RX ORDER — NITROGLYCERIN 0.4 MG/1
0.4 TABLET SUBLINGUAL EVERY 5 MIN PRN
Status: DISCONTINUED | OUTPATIENT
Start: 2023-12-04 | End: 2023-12-04 | Stop reason: HOSPADM

## 2023-12-04 RX ADMIN — NITROGLYCERIN 0.4 MG: 0.4 TABLET, ORALLY DISINTEGRATING SUBLINGUAL at 13:20

## 2023-12-04 ASSESSMENT — LIFESTYLE VARIABLES
HOW OFTEN DO YOU HAVE A DRINK CONTAINING ALCOHOL: NEVER
HOW MANY STANDARD DRINKS CONTAINING ALCOHOL DO YOU HAVE ON A TYPICAL DAY: PATIENT DOES NOT DRINK

## 2023-12-04 ASSESSMENT — PAIN SCALES - GENERAL: PAINLEVEL_OUTOF10: 8

## 2023-12-04 ASSESSMENT — PAIN - FUNCTIONAL ASSESSMENT: PAIN_FUNCTIONAL_ASSESSMENT: 0-10

## 2023-12-04 ASSESSMENT — PAIN DESCRIPTION - LOCATION: LOCATION: CHEST

## 2023-12-04 ASSESSMENT — HEART SCORE: ECG: 1

## 2023-12-04 NOTE — ED PROVIDER NOTES
EMERGENCY DEPARTMENT HISTORY AND PHYSICAL EXAM    9:29 PM      Date: 12/4/2023  Patient Name: Tor Goltz    History of Presenting Illness     Chief Complaint   Patient presents with    Chest Pain    Dizziness       History From: Patient  Patient with history of CABG and CAD presents for chest pain. She states that her chest feels heavy and is also having L sided sharp chest pains that do not change with exertion with left arm tingling and lightheadedness that started yesterday around noon. She denies SOB, nausea, vomiting, and edema. Nursing Notes were all reviewed and agreed with or any disagreements were addressed in the HPI. PCP: Javon Patel MD    No current facility-administered medications for this encounter. Current Outpatient Medications   Medication Sig Dispense Refill    carBAMazepine (TEGRETOL) 200 MG tablet Take 1 tablet by mouth 2 times daily 60 tablet 3    clotrimazole-betamethasone (LOTRISONE) 1-0.05 % cream Apply topically 2 times daily.  45 g 0    olmesartan-hydroCHLOROthiazide (BENICAR HCT) 20-12.5 MG per tablet Take 1 tablet by mouth daily 90 tablet 1    aspirin 81 MG EC tablet Take 1 tablet by mouth daily 90 tablet 1    pantoprazole (PROTONIX) 40 MG tablet Take 1 tablet by mouth every morning (before breakfast)      hyoscyamine (LEVSIN/SL) 125 MCG sublingual tablet TAKE 1 UNDER THE TONGUE EVERY 4-6 HOURS AS NEEDED FOR ABDOMINAL PAIN OR CRAMPING      gabapentin (NEURONTIN) 600 MG tablet       magnesium oxide (MAG-OX) 400 MG tablet Take 1 tablet by mouth daily 90 tablet 3    Semaglutide, 2 MG/DOSE, 8 MG/3ML SOPN Inject 2 mg into the skin once a week 3 mL 3    metoprolol tartrate (LOPRESSOR) 25 MG tablet TAKE 0.5 TABLETS BY MOUTH TWICE A DAY 90 tablet 3    Multiple Vitamins-Minerals (ONE-A-DAY WOMENS PO) Take 1 tablet by mouth daily      atorvastatin (LIPITOR) 20 MG tablet Take 1 tablet by mouth daily      DULoxetine (CYMBALTA) 60 MG extended release capsule Take 1

## 2023-12-04 NOTE — ED NOTES
Discharge instructions reviewed with patient. Patient verbalized understanding. Patient advised to follow up as directed on discharge instructions. Patient denies questions, needs or concerns at this time. Patient verbalized understanding. No s/sx of distress noted.        Jenni Perea RN  12/04/23 [id number]

## 2023-12-04 NOTE — ED TRIAGE NOTES
Chest feels heavy, sharp pains in left arm , tingly numbness feeling in left arm, lightheaded started yesterday around noon

## 2023-12-04 NOTE — DISCHARGE INSTRUCTIONS
The results of your testing are reassuring. Please call your cardiologist and schedule and appointment as soon as your can to discuss the next steps. Please return to the ED if you start to experience chest pain again.

## 2023-12-05 ASSESSMENT — ENCOUNTER SYMPTOMS
NAUSEA: 0
SHORTNESS OF BREATH: 0
ABDOMINAL PAIN: 0
CHEST TIGHTNESS: 1
VOMITING: 0

## 2023-12-06 ENCOUNTER — TELEPHONE (OUTPATIENT)
Age: 53
End: 2023-12-06

## 2023-12-06 DIAGNOSIS — R07.9 CHEST PAIN, UNSPECIFIED TYPE: Primary | ICD-10-CM

## 2023-12-06 DIAGNOSIS — I25.10 ATHEROSCLEROSIS OF NATIVE CORONARY ARTERY OF NATIVE HEART WITHOUT ANGINA PECTORIS: ICD-10-CM

## 2023-12-06 NOTE — TELEPHONE ENCOUNTER
----- Message from Mj Nolan sent at 12/5/2023  9:29 AM EST -----  Patient said she went to the ED last night due to not feeling good and she said she wanted to do a stress test because she hasn't had one in a while. I advised patient that Dr. Lovely Manjarrez would need to review and make the decision of having one done.

## 2023-12-06 NOTE — TELEPHONE ENCOUNTER
Verbal order and read back per Arianna Machado MD  Order   Pharm nuc for chest pain and CAD     Patient aware

## 2024-01-03 ENCOUNTER — OFFICE VISIT (OUTPATIENT)
Age: 54
End: 2024-01-03
Payer: COMMERCIAL

## 2024-01-03 VITALS
BODY MASS INDEX: 46 KG/M2 | WEIGHT: 259.6 LBS | OXYGEN SATURATION: 97 % | TEMPERATURE: 97.2 F | DIASTOLIC BLOOD PRESSURE: 76 MMHG | HEIGHT: 63 IN | SYSTOLIC BLOOD PRESSURE: 113 MMHG | HEART RATE: 63 BPM | RESPIRATION RATE: 18 BRPM

## 2024-01-03 DIAGNOSIS — I10 ESSENTIAL (PRIMARY) HYPERTENSION: Primary | ICD-10-CM

## 2024-01-03 DIAGNOSIS — E11.65 TYPE 2 DIABETES MELLITUS WITH HYPERGLYCEMIA, WITHOUT LONG-TERM CURRENT USE OF INSULIN (HCC): ICD-10-CM

## 2024-01-03 PROCEDURE — 3078F DIAST BP <80 MM HG: CPT | Performed by: FAMILY MEDICINE

## 2024-01-03 PROCEDURE — 99214 OFFICE O/P EST MOD 30 MIN: CPT | Performed by: FAMILY MEDICINE

## 2024-01-03 PROCEDURE — 3074F SYST BP LT 130 MM HG: CPT | Performed by: FAMILY MEDICINE

## 2024-01-03 ASSESSMENT — ANXIETY QUESTIONNAIRES
4. TROUBLE RELAXING: 0
3. WORRYING TOO MUCH ABOUT DIFFERENT THINGS: 0
2. NOT BEING ABLE TO STOP OR CONTROL WORRYING: 0
6. BECOMING EASILY ANNOYED OR IRRITABLE: 0
GAD7 TOTAL SCORE: 0
7. FEELING AFRAID AS IF SOMETHING AWFUL MIGHT HAPPEN: 0
1. FEELING NERVOUS, ANXIOUS, OR ON EDGE: 0
IF YOU CHECKED OFF ANY PROBLEMS ON THIS QUESTIONNAIRE, HOW DIFFICULT HAVE THESE PROBLEMS MADE IT FOR YOU TO DO YOUR WORK, TAKE CARE OF THINGS AT HOME, OR GET ALONG WITH OTHER PEOPLE: NOT DIFFICULT AT ALL
5. BEING SO RESTLESS THAT IT IS HARD TO SIT STILL: 0

## 2024-01-03 ASSESSMENT — PATIENT HEALTH QUESTIONNAIRE - PHQ9
SUM OF ALL RESPONSES TO PHQ QUESTIONS 1-9: 0
2. FEELING DOWN, DEPRESSED OR HOPELESS: 0
SUM OF ALL RESPONSES TO PHQ QUESTIONS 1-9: 0
1. LITTLE INTEREST OR PLEASURE IN DOING THINGS: 0
SUM OF ALL RESPONSES TO PHQ9 QUESTIONS 1 & 2: 0
SUM OF ALL RESPONSES TO PHQ QUESTIONS 1-9: 0
SUM OF ALL RESPONSES TO PHQ QUESTIONS 1-9: 0

## 2024-01-03 NOTE — PROGRESS NOTES
HPI:  Jo Ann Rodriguez is a 53 y.o. female who presents today with   Chief Complaint   Patient presents with    Diabetes     4 month follow-up   She has lost weight  Last A1c was controlled 6.0%  She is on ozempic; she is in need of a refill on the Ozempic.  Patient recently was seen for chest pain.  A stress test is scheduled for 1/11/2024.  He continues to have a pain in her left chest wall in the evening.      Patient also has hypertension.  Her blood pressure is stable today.    Wt Readings from Last 3 Encounters:   01/03/24 117.8 kg (259 lb 9.6 oz)   12/04/23 120.2 kg (265 lb)   11/01/23 117.8 kg (259 lb 9.6 oz)     Lab Results   Component Value Date     12/04/2023    K 3.8 12/04/2023     12/04/2023    CO2 31 12/04/2023    BUN 17 12/04/2023    CREATININE 0.86 12/04/2023    GLUCOSE 103 (H) 12/04/2023    CALCIUM 9.0 12/04/2023    PROT 7.4 12/04/2023    LABALBU 3.7 12/04/2023    BILITOT 0.4 12/04/2023    ALKPHOS 276 (H) 12/04/2023    AST 52 (H) 12/04/2023     (H) 12/04/2023    LABGLOM >60 12/04/2023    GFRAA >60 07/13/2022    AGRATIO 1.2 07/13/2022    GLOB 3.7 12/04/2023       Hemoglobin A1C   Date Value Ref Range Status   11/10/2023 6.0 (H) 4.2 - 5.6 % Final     Comment:     (NOTE)  HbA1C Interpretive Ranges  <5.7              Normal  5.7 - 6.4         Consider Prediabetes  >6.5              Consider Diabetes       Lab Results   Component Value Date    CHOL 198 11/10/2023    TRIG 60 11/10/2023    HDL 86 (H) 11/10/2023    LDLCALC 100 11/10/2023    LABVLDL 12 11/10/2023    CHOLHDLRATIO 2.3 11/10/2023                    No data to display                        PMH,  Meds, Allergies, Family History, Social history reviewed      Current Outpatient Medications   Medication Sig Dispense Refill    Semaglutide, 2 MG/DOSE, 8 MG/3ML SOPN Inject 2 mg into the skin once a week 3 mL 4    carBAMazepine (TEGRETOL) 200 MG tablet Take 1 tablet by mouth 2 times daily 60 tablet 3    clotrimazole-betamethasone

## 2024-01-03 NOTE — PROGRESS NOTES
1. \"Have you been to the ER, urgent care clinic since your last visit?  Hospitalized since your last visit?\" No    2. \"Have you seen or consulted any other health care providers outside of the [facility] since your last visit?\" No     3. For patients aged 45-75: Has the patient had a colonoscopy / FIT/ Cologuard? Yes - Care Gap present. Most recent result on file      If the patient is female:    4. For patients aged 40-74: Has the patient had a mammogram within the past 2 years? Yes - Care Gap present. Rooming MA/LPN to request most recent results      5. For patients aged 21-65: Has the patient had a pap smear? Yes - Care Gap present. Most recent result on file

## 2024-01-24 ENCOUNTER — OFFICE VISIT (OUTPATIENT)
Age: 54
End: 2024-01-24
Payer: COMMERCIAL

## 2024-01-24 VITALS
SYSTOLIC BLOOD PRESSURE: 138 MMHG | BODY MASS INDEX: 46.07 KG/M2 | HEART RATE: 60 BPM | WEIGHT: 260 LBS | DIASTOLIC BLOOD PRESSURE: 72 MMHG | OXYGEN SATURATION: 98 % | HEIGHT: 63 IN

## 2024-01-24 DIAGNOSIS — I24.9 ACUTE ISCHEMIC HEART DISEASE, UNSPECIFIED (HCC): ICD-10-CM

## 2024-01-24 DIAGNOSIS — I25.10 ATHEROSCLEROSIS OF NATIVE CORONARY ARTERY OF NATIVE HEART WITHOUT ANGINA PECTORIS: Primary | ICD-10-CM

## 2024-01-24 DIAGNOSIS — R07.9 CHEST PAIN, UNSPECIFIED TYPE: ICD-10-CM

## 2024-01-24 PROCEDURE — 3075F SYST BP GE 130 - 139MM HG: CPT | Performed by: INTERNAL MEDICINE

## 2024-01-24 PROCEDURE — 93000 ELECTROCARDIOGRAM COMPLETE: CPT | Performed by: INTERNAL MEDICINE

## 2024-01-24 PROCEDURE — 99214 OFFICE O/P EST MOD 30 MIN: CPT | Performed by: INTERNAL MEDICINE

## 2024-01-24 PROCEDURE — 3078F DIAST BP <80 MM HG: CPT | Performed by: INTERNAL MEDICINE

## 2024-01-24 ASSESSMENT — PATIENT HEALTH QUESTIONNAIRE - PHQ9
1. LITTLE INTEREST OR PLEASURE IN DOING THINGS: 0
SUM OF ALL RESPONSES TO PHQ QUESTIONS 1-9: 0
SUM OF ALL RESPONSES TO PHQ9 QUESTIONS 1 & 2: 0
SUM OF ALL RESPONSES TO PHQ QUESTIONS 1-9: 0
SUM OF ALL RESPONSES TO PHQ QUESTIONS 1-9: 0
2. FEELING DOWN, DEPRESSED OR HOPELESS: 0
SUM OF ALL RESPONSES TO PHQ QUESTIONS 1-9: 0

## 2024-01-24 NOTE — PROGRESS NOTES
HISTORY OF PRESENT ILLNESS  Jo Ann Rodriguez  54 y.o. female     Chief Complaint   Patient presents with    Follow-up     6 months       ASSESSMENT and PLAN    The primary encounter diagnosis was Atherosclerosis of native coronary artery of native heart without angina pectoris. Diagnoses of Chest pain, unspecified type and Acute ischemic heart disease, unspecified (HCC) were also pertinent to this visit.    Ms. Jo Ann Rodriguez has history of CAD. She presented back in December 2011 with atypical chest pains but abnormal EKG, and abnormal nuclear scan. Her coronary angiogram revealed ostial 90% LAD stenosis; FFR was performed with intrinsic value of 0.87 and adenosine induced 0.66. She subsequently underwent single vessel LIMA to LAD bypass surgery. Since her bypass surgery, she presented twice with chest pains to the emergency room which were again quite atypical. She had nuclear scans done both instances which were normal.  Back in November 2016, she presented to [facility] with chest pain.  Nuclear scan showed ejection fraction of 60% but apical filling defect.  Ultimately, repeat coronary angiography was performed.  It was noted that her JORDAN to LAD was widely patent.    Again, in March 2020, she presented with chest pains (shoulder and jaw pain), and abnormal nuclear scan involving the basal anterior wall, with decline in EF to 40%.  She underwent coronary angiography which revealed widely patent LIMA to LAD with ostial LAD moderate stenosis.  Continue medical therapy was recommended.  Because of chest pains, she presented to the emergency room.  In January 2024, she had repeat nuclear scan which showed EF 52% with likely normal perfusion.    CAD:    Clinically stable.  BP:    Well-controlled at 138/72.  Rhythm:    Stable sinus rhythm at 60 bpm.  CHF:    There is no evidence of decompensated CHF noted.  Weight:     Her weight today is 260 pounds.  Her baseline weight is around 250

## 2024-01-24 NOTE — PROGRESS NOTES
Jo Ann Rodriguez presents today for   Chief Complaint   Patient presents with    Follow-up     6 months       Jo Ann Rodriguez preferred language for health care discussion is english/other.    Is someone accompanying this pt? no    Is the patient using any DME equipment during OV? no    Depression Screening:  Depression: Not at risk (1/24/2024)    PHQ-2     PHQ-2 Score: 0   Recent Concern: Depression - At risk (11/1/2023)    PHQ-2     PHQ-2 Score: 8        Learning Assessment:  Who is the primary learner? Patient    What is the preferred language for health care of the primary learner? ENGLISH    How does the primary learner prefer to learn new concepts? DEMONSTRATION    Answered By patient    Relationship to Learner SELF           Pt currently taking Anticoagulant therapy? no    Pt currently taking Antiplatelet therapy ? aspirin      Coordination of Care:  1. Have you been to the ER, urgent care clinic since your last visit? Hospitalized since your last visit? no    2. Have you seen or consulted any other health care providers outside of the [facility] since your last visit? Include any pap smears or colon screening. no

## 2024-03-07 NOTE — TELEPHONE ENCOUNTER
Last Visit: 01/03/2024  Next Appointment: 05/06/2024    Requested Prescriptions     Pending Prescriptions Disp Refills    magnesium oxide (MAG-OX) 400 MG tablet 90 tablet 3     Sig: Take 1 tablet by mouth daily    aspirin 81 MG EC tablet 90 tablet 1     Sig: Take 1 tablet by mouth daily

## 2024-03-09 RX ORDER — MAGNESIUM OXIDE 400 MG/1
1 TABLET ORAL DAILY
Qty: 90 TABLET | Refills: 3 | Status: SHIPPED | OUTPATIENT
Start: 2024-03-09

## 2024-03-09 RX ORDER — ASPIRIN 81 MG/1
81 TABLET ORAL DAILY
Qty: 90 TABLET | Refills: 1 | Status: SHIPPED | OUTPATIENT
Start: 2024-03-09

## 2024-03-13 ENCOUNTER — TELEPHONE (OUTPATIENT)
Age: 54
End: 2024-03-13

## 2024-03-13 NOTE — TELEPHONE ENCOUNTER
Patient called requesting appointment for migraines states blood pressure been in the high range 175/106 was the highest and took bp this morning 151/96 states took blood pressure medication at 4:45 this morning and still having migraines patient has been scheduled for virtual visit tomorrow with pcp patient requesting call from nurse to advised if she needs to go to e/r.

## 2024-03-13 NOTE — TELEPHONE ENCOUNTER
The message has been forwarded to the provider for review. Will wait for response and will give the patient a call back in regards to providers advise, thank you.

## 2024-03-13 NOTE — TELEPHONE ENCOUNTER
Spoke with the patient who stated that she has no SOB, Chest pain, no slurred speech. Patient stated that she does have a little, lightheaded with dizziness and a headache, pain 8/10. Patient stated that she did recheck her blood pressure at 1406 and it read 139/88 and is much better but still have the headache. Patient will keep her Virtual appointment for tomorrow, 03/14/2024 at 1640. Patient informed no need to go to the ER and acknowledged understanding and had no questions or concerns for the provider at this time. The provider was made aware, thank you.

## 2024-03-14 ENCOUNTER — APPOINTMENT (OUTPATIENT)
Facility: HOSPITAL | Age: 54
End: 2024-03-14
Payer: COMMERCIAL

## 2024-03-14 ENCOUNTER — HOSPITAL ENCOUNTER (INPATIENT)
Facility: HOSPITAL | Age: 54
LOS: 1 days | Discharge: HOME | End: 2024-03-15
Attending: EMERGENCY MEDICINE | Admitting: INTERNAL MEDICINE
Payer: COMMERCIAL

## 2024-03-14 ENCOUNTER — HOSPITAL ENCOUNTER (INPATIENT)
Facility: HOSPITAL | Age: 54
End: 2024-03-14
Payer: COMMERCIAL

## 2024-03-14 DIAGNOSIS — I63.9 CEREBROVASCULAR ACCIDENT (CVA), UNSPECIFIED MECHANISM (HCC): Primary | ICD-10-CM

## 2024-03-14 PROBLEM — G45.9 TIA (TRANSIENT ISCHEMIC ATTACK): Status: ACTIVE | Noted: 2024-03-14

## 2024-03-14 PROBLEM — I61.9 CVA (CEREBROVASCULAR ACCIDENT DUE TO INTRACEREBRAL HEMORRHAGE) (HCC): Status: ACTIVE | Noted: 2024-03-14

## 2024-03-14 LAB
ALBUMIN SERPL-MCNC: 3.9 G/DL (ref 3.4–5)
ALBUMIN/GLOB SERPL: 1.2 (ref 0.8–1.7)
ALP SERPL-CCNC: 282 U/L (ref 45–117)
ALT SERPL-CCNC: 55 U/L (ref 13–56)
ANION GAP SERPL CALC-SCNC: 7 MMOL/L (ref 3–18)
AST SERPL-CCNC: 33 U/L (ref 10–38)
BASOPHILS # BLD: 0 K/UL (ref 0–0.1)
BASOPHILS NFR BLD: 0 % (ref 0–2)
BILIRUB SERPL-MCNC: 0.4 MG/DL (ref 0.2–1)
BUN SERPL-MCNC: 11 MG/DL (ref 7–18)
BUN/CREAT SERPL: 13 (ref 12–20)
CALCIUM SERPL-MCNC: 9 MG/DL (ref 8.5–10.1)
CHLORIDE SERPL-SCNC: 97 MMOL/L (ref 100–111)
CHOLEST SERPL-MCNC: 203 MG/DL
CO2 SERPL-SCNC: 30 MMOL/L (ref 21–32)
CREAT SERPL-MCNC: 0.83 MG/DL (ref 0.6–1.3)
DIFFERENTIAL METHOD BLD: NORMAL
EKG ATRIAL RATE: 73 BPM
EKG DIAGNOSIS: NORMAL
EKG P AXIS: 49 DEGREES
EKG P-R INTERVAL: 170 MS
EKG Q-T INTERVAL: 394 MS
EKG QRS DURATION: 92 MS
EKG QTC CALCULATION (BAZETT): 434 MS
EKG R AXIS: 57 DEGREES
EKG T AXIS: 165 DEGREES
EKG VENTRICULAR RATE: 73 BPM
EOSINOPHIL # BLD: 0.1 K/UL (ref 0–0.4)
EOSINOPHIL NFR BLD: 1 % (ref 0–5)
ERYTHROCYTE [DISTWIDTH] IN BLOOD BY AUTOMATED COUNT: 13.2 % (ref 11.6–14.5)
EST. AVERAGE GLUCOSE BLD GHB EST-MCNC: 123 MG/DL
GLOBULIN SER CALC-MCNC: 3.3 G/DL (ref 2–4)
GLUCOSE BLD STRIP.AUTO-MCNC: 117 MG/DL (ref 70–110)
GLUCOSE SERPL-MCNC: 111 MG/DL (ref 74–99)
HBA1C MFR BLD: 5.9 % (ref 4.2–5.6)
HCT VFR BLD AUTO: 38.8 % (ref 35–45)
HDLC SERPL-MCNC: 91 MG/DL (ref 40–60)
HDLC SERPL: 2.2 (ref 0–5)
HGB BLD-MCNC: 13.4 G/DL (ref 12–16)
IMM GRANULOCYTES # BLD AUTO: 0 K/UL (ref 0–0.04)
IMM GRANULOCYTES NFR BLD AUTO: 0 % (ref 0–0.5)
INR PPP: 1.1 (ref 0.9–1.1)
LDLC SERPL CALC-MCNC: 97.2 MG/DL (ref 0–100)
LIPID PANEL: ABNORMAL
LYMPHOCYTES # BLD: 2 K/UL (ref 0.9–3.6)
LYMPHOCYTES NFR BLD: 30 % (ref 21–52)
MCH RBC QN AUTO: 29.3 PG (ref 24–34)
MCHC RBC AUTO-ENTMCNC: 34.5 G/DL (ref 31–37)
MCV RBC AUTO: 84.9 FL (ref 78–100)
MONOCYTES # BLD: 0.5 K/UL (ref 0.05–1.2)
MONOCYTES NFR BLD: 8 % (ref 3–10)
NEUTS SEG # BLD: 4.1 K/UL (ref 1.8–8)
NEUTS SEG NFR BLD: 61 % (ref 40–73)
NRBC # BLD: 0 K/UL (ref 0–0.01)
NRBC BLD-RTO: 0 PER 100 WBC
PLATELET # BLD AUTO: 174 K/UL (ref 135–420)
PMV BLD AUTO: 11.3 FL (ref 9.2–11.8)
POTASSIUM SERPL-SCNC: 3.8 MMOL/L (ref 3.5–5.5)
PROT SERPL-MCNC: 7.2 G/DL (ref 6.4–8.2)
PROTHROMBIN TIME: 14.2 SEC (ref 11.9–14.7)
RBC # BLD AUTO: 4.57 M/UL (ref 4.2–5.3)
SODIUM SERPL-SCNC: 134 MMOL/L (ref 136–145)
TRIGL SERPL-MCNC: 74 MG/DL
TROPONIN I SERPL HS-MCNC: 4 NG/L (ref 0–54)
TSH SERPL DL<=0.05 MIU/L-ACNC: 1.57 UIU/ML (ref 0.36–3.74)
VLDLC SERPL CALC-MCNC: 14.8 MG/DL
WBC # BLD AUTO: 6.8 K/UL (ref 4.6–13.2)

## 2024-03-14 PROCEDURE — 94761 N-INVAS EAR/PLS OXIMETRY MLT: CPT

## 2024-03-14 PROCEDURE — 2580000003 HC RX 258: Performed by: INTERNAL MEDICINE

## 2024-03-14 PROCEDURE — 36415 COLL VENOUS BLD VENIPUNCTURE: CPT

## 2024-03-14 PROCEDURE — 84443 ASSAY THYROID STIM HORMONE: CPT

## 2024-03-14 PROCEDURE — 83036 HEMOGLOBIN GLYCOSYLATED A1C: CPT

## 2024-03-14 PROCEDURE — 97166 OT EVAL MOD COMPLEX 45 MIN: CPT

## 2024-03-14 PROCEDURE — 84484 ASSAY OF TROPONIN QUANT: CPT

## 2024-03-14 PROCEDURE — 82962 GLUCOSE BLOOD TEST: CPT

## 2024-03-14 PROCEDURE — 6360000002 HC RX W HCPCS: Performed by: EMERGENCY MEDICINE

## 2024-03-14 PROCEDURE — 6370000000 HC RX 637 (ALT 250 FOR IP): Performed by: INTERNAL MEDICINE

## 2024-03-14 PROCEDURE — 85025 COMPLETE CBC W/AUTO DIFF WBC: CPT

## 2024-03-14 PROCEDURE — A9575 INJ GADOTERATE MEGLUMI 0.1ML: HCPCS | Performed by: EMERGENCY MEDICINE

## 2024-03-14 PROCEDURE — 93010 ELECTROCARDIOGRAM REPORT: CPT | Performed by: INTERNAL MEDICINE

## 2024-03-14 PROCEDURE — 97530 THERAPEUTIC ACTIVITIES: CPT

## 2024-03-14 PROCEDURE — 1100000003 HC PRIVATE W/ TELEMETRY

## 2024-03-14 PROCEDURE — 99222 1ST HOSP IP/OBS MODERATE 55: CPT | Performed by: INTERNAL MEDICINE

## 2024-03-14 PROCEDURE — 85610 PROTHROMBIN TIME: CPT

## 2024-03-14 PROCEDURE — 93005 ELECTROCARDIOGRAM TRACING: CPT | Performed by: EMERGENCY MEDICINE

## 2024-03-14 PROCEDURE — 70498 CT ANGIOGRAPHY NECK: CPT

## 2024-03-14 PROCEDURE — 96375 TX/PRO/DX INJ NEW DRUG ADDON: CPT

## 2024-03-14 PROCEDURE — 80053 COMPREHEN METABOLIC PANEL: CPT

## 2024-03-14 PROCEDURE — 6360000004 HC RX CONTRAST MEDICATION: Performed by: EMERGENCY MEDICINE

## 2024-03-14 PROCEDURE — 99285 EMERGENCY DEPT VISIT HI MDM: CPT

## 2024-03-14 PROCEDURE — 70553 MRI BRAIN STEM W/O & W/DYE: CPT

## 2024-03-14 PROCEDURE — 80061 LIPID PANEL: CPT

## 2024-03-14 PROCEDURE — 96374 THER/PROPH/DIAG INJ IV PUSH: CPT

## 2024-03-14 PROCEDURE — 70450 CT HEAD/BRAIN W/O DYE: CPT

## 2024-03-14 RX ORDER — CARBAMAZEPINE 200 MG/1
200 TABLET ORAL 2 TIMES DAILY
Status: DISCONTINUED | OUTPATIENT
Start: 2024-03-14 | End: 2024-03-15 | Stop reason: HOSPADM

## 2024-03-14 RX ORDER — SODIUM CHLORIDE 0.9 % (FLUSH) 0.9 %
5-40 SYRINGE (ML) INJECTION PRN
Status: DISCONTINUED | OUTPATIENT
Start: 2024-03-14 | End: 2024-03-15 | Stop reason: HOSPADM

## 2024-03-14 RX ORDER — ATORVASTATIN CALCIUM 20 MG/1
20 TABLET, FILM COATED ORAL DAILY
Status: DISCONTINUED | OUTPATIENT
Start: 2024-03-14 | End: 2024-03-14 | Stop reason: SDUPTHER

## 2024-03-14 RX ORDER — POTASSIUM CHLORIDE 7.45 MG/ML
10 INJECTION INTRAVENOUS PRN
Status: DISCONTINUED | OUTPATIENT
Start: 2024-03-14 | End: 2024-03-15 | Stop reason: HOSPADM

## 2024-03-14 RX ORDER — ASPIRIN 81 MG/1
81 TABLET ORAL DAILY
Status: DISCONTINUED | OUTPATIENT
Start: 2024-03-14 | End: 2024-03-14 | Stop reason: SDUPTHER

## 2024-03-14 RX ORDER — ASPIRIN 81 MG/1
81 TABLET ORAL DAILY
Status: DISCONTINUED | OUTPATIENT
Start: 2024-03-15 | End: 2024-03-15 | Stop reason: HOSPADM

## 2024-03-14 RX ORDER — ONDANSETRON 2 MG/ML
4 INJECTION INTRAMUSCULAR; INTRAVENOUS EVERY 6 HOURS PRN
Status: DISCONTINUED | OUTPATIENT
Start: 2024-03-14 | End: 2024-03-15 | Stop reason: HOSPADM

## 2024-03-14 RX ORDER — ATORVASTATIN CALCIUM 20 MG/1
20 TABLET, FILM COATED ORAL DAILY
Status: DISCONTINUED | OUTPATIENT
Start: 2024-03-15 | End: 2024-03-15 | Stop reason: HOSPADM

## 2024-03-14 RX ORDER — GABAPENTIN 300 MG/1
600 CAPSULE ORAL 2 TIMES DAILY
Status: DISCONTINUED | OUTPATIENT
Start: 2024-03-14 | End: 2024-03-15 | Stop reason: HOSPADM

## 2024-03-14 RX ORDER — DULOXETIN HYDROCHLORIDE 60 MG/1
60 CAPSULE, DELAYED RELEASE ORAL DAILY
Status: DISCONTINUED | OUTPATIENT
Start: 2024-03-15 | End: 2024-03-15 | Stop reason: HOSPADM

## 2024-03-14 RX ORDER — LOSARTAN POTASSIUM 50 MG/1
50 TABLET ORAL DAILY
Status: DISCONTINUED | OUTPATIENT
Start: 2024-03-14 | End: 2024-03-15 | Stop reason: HOSPADM

## 2024-03-14 RX ORDER — POLYETHYLENE GLYCOL 3350 17 G/17G
17 POWDER, FOR SOLUTION ORAL DAILY PRN
Status: DISCONTINUED | OUTPATIENT
Start: 2024-03-14 | End: 2024-03-15 | Stop reason: HOSPADM

## 2024-03-14 RX ORDER — ONDANSETRON 4 MG/1
4 TABLET, ORALLY DISINTEGRATING ORAL EVERY 8 HOURS PRN
Status: DISCONTINUED | OUTPATIENT
Start: 2024-03-14 | End: 2024-03-15 | Stop reason: HOSPADM

## 2024-03-14 RX ORDER — MAGNESIUM SULFATE IN WATER 40 MG/ML
2000 INJECTION, SOLUTION INTRAVENOUS PRN
Status: DISCONTINUED | OUTPATIENT
Start: 2024-03-14 | End: 2024-03-15 | Stop reason: HOSPADM

## 2024-03-14 RX ORDER — SODIUM CHLORIDE 9 MG/ML
INJECTION, SOLUTION INTRAVENOUS PRN
Status: DISCONTINUED | OUTPATIENT
Start: 2024-03-14 | End: 2024-03-15 | Stop reason: HOSPADM

## 2024-03-14 RX ORDER — DIPHENHYDRAMINE HYDROCHLORIDE 50 MG/ML
25 INJECTION INTRAMUSCULAR; INTRAVENOUS
Status: COMPLETED | OUTPATIENT
Start: 2024-03-14 | End: 2024-03-14

## 2024-03-14 RX ORDER — HYDROCHLOROTHIAZIDE 25 MG/1
12.5 TABLET ORAL DAILY
Status: DISCONTINUED | OUTPATIENT
Start: 2024-03-14 | End: 2024-03-15 | Stop reason: HOSPADM

## 2024-03-14 RX ORDER — LANOLIN ALCOHOL/MO/W.PET/CERES
400 CREAM (GRAM) TOPICAL EVERY MORNING
Status: DISCONTINUED | OUTPATIENT
Start: 2024-03-15 | End: 2024-03-15 | Stop reason: HOSPADM

## 2024-03-14 RX ORDER — KETOROLAC TROMETHAMINE 15 MG/ML
15 INJECTION, SOLUTION INTRAMUSCULAR; INTRAVENOUS EVERY 6 HOURS PRN
Status: DISCONTINUED | OUTPATIENT
Start: 2024-03-14 | End: 2024-03-14

## 2024-03-14 RX ORDER — METOCLOPRAMIDE HYDROCHLORIDE 5 MG/ML
10 INJECTION INTRAMUSCULAR; INTRAVENOUS
Status: COMPLETED | OUTPATIENT
Start: 2024-03-14 | End: 2024-03-14

## 2024-03-14 RX ORDER — OLMESARTAN MEDOXOMIL AND HYDROCHLOROTHIAZIDE 20/12.5 20; 12.5 MG/1; MG/1
1 TABLET ORAL DAILY
Status: DISCONTINUED | OUTPATIENT
Start: 2024-03-14 | End: 2024-03-14 | Stop reason: CLARIF

## 2024-03-14 RX ORDER — MORPHINE SULFATE 2 MG/ML
2 INJECTION, SOLUTION INTRAMUSCULAR; INTRAVENOUS
Status: COMPLETED | OUTPATIENT
Start: 2024-03-14 | End: 2024-03-14

## 2024-03-14 RX ORDER — PANTOPRAZOLE SODIUM 40 MG/1
40 TABLET, DELAYED RELEASE ORAL
Status: DISCONTINUED | OUTPATIENT
Start: 2024-03-15 | End: 2024-03-15 | Stop reason: HOSPADM

## 2024-03-14 RX ORDER — SODIUM CHLORIDE 0.9 % (FLUSH) 0.9 %
5-40 SYRINGE (ML) INJECTION EVERY 12 HOURS SCHEDULED
Status: DISCONTINUED | OUTPATIENT
Start: 2024-03-14 | End: 2024-03-15 | Stop reason: HOSPADM

## 2024-03-14 RX ORDER — ACETAMINOPHEN 325 MG/1
650 TABLET ORAL EVERY 6 HOURS PRN
Status: DISCONTINUED | OUTPATIENT
Start: 2024-03-14 | End: 2024-03-15 | Stop reason: HOSPADM

## 2024-03-14 RX ORDER — SODIUM CHLORIDE 9 MG/ML
INJECTION, SOLUTION INTRAVENOUS CONTINUOUS
Status: DISCONTINUED | OUTPATIENT
Start: 2024-03-14 | End: 2024-03-15

## 2024-03-14 RX ORDER — ACETAMINOPHEN 650 MG/1
650 SUPPOSITORY RECTAL EVERY 6 HOURS PRN
Status: DISCONTINUED | OUTPATIENT
Start: 2024-03-14 | End: 2024-03-15 | Stop reason: HOSPADM

## 2024-03-14 RX ORDER — GABAPENTIN 600 MG/1
600 TABLET ORAL 2 TIMES DAILY
Status: DISCONTINUED | OUTPATIENT
Start: 2024-03-14 | End: 2024-03-14 | Stop reason: SDUPTHER

## 2024-03-14 RX ADMIN — METOPROLOL TARTRATE 12.5 MG: 25 TABLET, FILM COATED ORAL at 20:54

## 2024-03-14 RX ADMIN — SODIUM CHLORIDE, PRESERVATIVE FREE 10 ML: 5 INJECTION INTRAVENOUS at 12:06

## 2024-03-14 RX ADMIN — CARBAMAZEPINE 200 MG: 200 TABLET ORAL at 20:54

## 2024-03-14 RX ADMIN — SODIUM CHLORIDE: 9 INJECTION, SOLUTION INTRAVENOUS at 11:17

## 2024-03-14 RX ADMIN — GABAPENTIN 600 MG: 300 CAPSULE ORAL at 20:54

## 2024-03-14 RX ADMIN — DIPHENHYDRAMINE HYDROCHLORIDE 25 MG: 50 INJECTION INTRAMUSCULAR; INTRAVENOUS at 03:13

## 2024-03-14 RX ADMIN — LOSARTAN POTASSIUM 50 MG: 50 TABLET, FILM COATED ORAL at 12:04

## 2024-03-14 RX ADMIN — GADOTERATE MEGLUMINE 20 ML: 376.9 INJECTION INTRAVENOUS at 08:30

## 2024-03-14 RX ADMIN — HYDROCHLOROTHIAZIDE 12.5 MG: 25 TABLET ORAL at 12:04

## 2024-03-14 RX ADMIN — METOCLOPRAMIDE HYDROCHLORIDE 10 MG: 5 INJECTION INTRAMUSCULAR; INTRAVENOUS at 03:14

## 2024-03-14 RX ADMIN — MORPHINE SULFATE 2 MG: 2 INJECTION, SOLUTION INTRAMUSCULAR; INTRAVENOUS at 03:28

## 2024-03-14 RX ADMIN — IOPAMIDOL 80 ML: 755 INJECTION, SOLUTION INTRAVENOUS at 01:34

## 2024-03-14 ASSESSMENT — PAIN DESCRIPTION - DESCRIPTORS
DESCRIPTORS: THROBBING
DESCRIPTORS: THROBBING

## 2024-03-14 ASSESSMENT — PAIN SCALES - GENERAL
PAINLEVEL_OUTOF10: 0
PAINLEVEL_OUTOF10: 8
PAINLEVEL_OUTOF10: 8

## 2024-03-14 ASSESSMENT — PAIN DESCRIPTION - ORIENTATION
ORIENTATION: MID
ORIENTATION: MID

## 2024-03-14 ASSESSMENT — PAIN DESCRIPTION - LOCATION
LOCATION: HEAD
LOCATION: HEAD

## 2024-03-14 ASSESSMENT — PAIN - FUNCTIONAL ASSESSMENT: PAIN_FUNCTIONAL_ASSESSMENT: 0-10

## 2024-03-14 ASSESSMENT — ENCOUNTER SYMPTOMS
EYES NEGATIVE: 1
RESPIRATORY NEGATIVE: 1
GASTROINTESTINAL NEGATIVE: 1

## 2024-03-14 NOTE — ED NOTES
Assumed care of pt from Susanne SAUCEDA. Pt is oriented x4. Pt is resting with room darkened and wearing dark glasses. Call bell within reach. Vitals are stable.

## 2024-03-14 NOTE — ED PROVIDER NOTES
`[facility] EMERGENCY DEPT  eMERGENCY dEPARTMENT eNCOUnter      Pt Name: Jo Ann Rodriguez  MRN: [id number]  Birthdate 1970 of evaluation: 3/14/2024  Provider:Duran Rose MD    CHIEF COMPLAINT         HPI  Jo Ann Rodriguez is a 54 y.o. female  c/o having  an elevated BP and headache that started morning morning. Pt states \"I woke up with a headacheout of my sleep and feeling off balance.\" Pt states she  took her BP several times at home and it was high 178/108. Pt also complains of having  photophobia during triage.     ROS  Review of Systems   Constitutional: Negative.    HENT: Negative.     Eyes: Negative.    Respiratory: Negative.     Cardiovascular: Negative.    Gastrointestinal: Negative.    Musculoskeletal: Negative.    Skin: Negative.    Neurological:  Positive for light-headedness and headaches. Negative for dizziness and seizures.   Hematological: Negative.    Psychiatric/Behavioral: Negative.     All other systems reviewed and are negative.      Except as noted above the remainder of the review of systems was reviewed and negative.       PAST MEDICAL HISTORY     Past Medical History:   Diagnosis Date    Abnormal PFT 10/1/2015    Dr. Delano Tee, Pulmonology    Acid reflux     CAD (coronary artery disease)     Coronary atherosclerosis of native coronary artery     Diabetes mellitus (HCC) 9/22/14    hgbA1C 6.8    Diabetic eye exam ([facility]) 2016    Dyslipidemia     Echocardiogram abnormal 07/28/2015    Tech difficult.  EF 50-55%.  No WMA.  Mild conc LVH.  Gr 2 DDfx.  RVSP 45-50 mmHg.  Mild LAE.  Mild LUPE.  Mod TR.  Mild IVCE.      GERD (gastroesophageal reflux disease)     H/O screening mammography 12/06/2016    no evidence of malignancy    History of myocardial perfusion scan 11/23/2016    High risk.  Mainly fixed anteroapical defect w/partial reversibility.  EF 60%.  No RWMA.  Nondiagnostic EKG on pharm stress test.    Hypercholesterolemia     Hypomagnesemia 8/14/14    1.4    PAD (peripheral artery    Resp: 14   Temp: 97.8 °F (36.6 °C)   TempSrc: Oral   SpO2: 97%   Weight: 117.9 kg (260 lb)   Height: 1.6 m (5' 3\")       MDM    CT HEAD WO CONTRAST    (Results Pending)     Consultation: Hospitalist Dr. Engle. accepted patient for admission to telemetry to rule out CVA.    Admit to telemetry    Procedures    FINAL IMPRESSION      Acute CVA    DISPOSITION/PLAN     DISPOSITION  - admission      (Please note that portions of this note were completed with a voice recognitionprogram.  Efforts were made to edit the dictations but occasionally words are mis-transcribed.)    Duran Rose MD(electronically signed)  Attending Emergency Physician          Duran Rose MD  03/17/24 0680

## 2024-03-14 NOTE — PROGRESS NOTES
conducted an initial consultation and Spiritual Assessment for Jo Ann Rodriguez, who is a 54 y.o.,female. Patient’s Primary Language is: English.   According to the patient’s EMR Latter-day Affiliation is: Moravian.     The reason the Patient came to the hospital is:   Patient Active Problem List    Diagnosis Date Noted    CVA (cerebrovascular accident due to intracerebral hemorrhage) ([facility]) 03/14/2024    TIA (transient ischemic attack) 03/14/2024    Out of work 07/06/2020    Type 2 diabetes with nephropathy ([facility]) 02/23/2019    Type 2 diabetes mellitus with diabetic neuropathy ([facility]) 08/04/2018    Pulmonary arterial hypertension ([facility])     Chest pain 11/23/2016    ACS (acute coronary syndrome) ([facility]) 11/22/2016    Hypertension, goal below 140/80 09/26/2016    Gastroesophageal reflux disease without esophagitis 12/07/2015    Abnormal PFT 10/01/2015    Perceptive hearing loss, both sides 04/21/2015    Diabetes mellitus ([facility]) 09/24/2014    Hypomagnesemia 08/18/2014    BMI 45.0-49.9, adult ([facility]) 05/14/2014    Dyslipidemia 01/31/2012    Obesity 01/31/2012    CAD (coronary artery disease), native coronary artery 01/23/2012    S/P CABG x 1 01/23/2012        The  provided the following Interventions:  Initiated a relationship of care and support. Roger Rodriguez, patient's spouse, was present in the room during my visit.  Explored issues of lionel, belief, spirituality and Hinduism/ritual needs while hospitalized.  Listened empathically.  Provided Advance Medical Directive education.  Patient does not want her  to be the healthcare decision maker but she needs to consider some other options. Patient did not complete AMD at this time but encouraged to call Spiritual Care for assistance if she changes her mind.  Provided information about Spiritual Care Services.  Offered prayer and assurance of continued prayers on patient's behalf.   Chart reviewed.    The following outcomes where achieved:  Patient shared  limited information about both her medical narrative and spiritual journey/beliefs.   confirmed Patient's Sikhism Uatsdin Affiliation.  Patient processed feeling about current hospitalization.  Patient expressed gratitude for 's visit.    Assessment:  Patient does not have any Anglican/cultural needs that will affect patient’s preferences in health care.  There are no spiritual or Anglican issues which require intervention at this time.     Plan:  Chaplains will continue to follow and will provide pastoral care on an as needed/requested basis.   recommends bedside caregivers page  on duty if patient shows signs of acute spiritual or emotional distress.     Camila Rodriguez, [facility]  Spiritual Care   [phone]

## 2024-03-14 NOTE — ED TRIAGE NOTES
Pt c/o of elevated BP and headache that started this morning. Pt states \"I woke up with a headache, woke me up out of my sleep and feeling off balance.\" Pt states I took my BP several times at home and it was high 178/108. Pt endorses photophobia during triage. MD at bedside     Code S called at 0036

## 2024-03-14 NOTE — ED NOTES
Pt has been updated on plan of care which includes admission to hospital and a MRI scheduled. Pt has no questions at this time.

## 2024-03-14 NOTE — PROGRESS NOTES
Physical Therapy  PT order received and chart reviewed.  Upon entering room for PT evaluation, pt was up OOB performing ADLs with OT and ambulating throughout room with no AD, good balance and notes she is \"feeling better and more back to normal.\"  Pt is at baseline level of functioning in terms of mobility and will be discharged from PT caseload.  Thank you for this referral.      Libertad Estrella, PT, DPT

## 2024-03-14 NOTE — ED NOTES
Pt back from MRI, placed back on monitor, call bell within reach. Transport called and ETA 45 mins. Fast Track

## 2024-03-14 NOTE — PROGRESS NOTES
Occupational Therapy  OCCUPATIONAL THERAPY EVALUATION/DISCHARGE    Patient: Jo Ann Rodriguez (54 y.o. female)  Date: 3/14/2024  Primary Diagnosis: CVA (cerebrovascular accident due to intracerebral hemorrhage) ([facility]) [I61.9]  Cerebrovascular accident (CVA), unspecified mechanism (HCC) [I63.9]  TIA (transient ischemic attack) [G45.9]    Precautions: General Precautions,  PLOF: Independent with ADLs    ASSESSMENT AND RECOMMENDATIONS:    Patient alert and cleared to participate by RN for OT evaluation. Patient seen supine in bed. Pt performed bed mobility to the EOB in preparation for ADLs independently. Pt able to don gown/brief standing and don/doff socks seated independently. Pt performed sit<>stand and functional mobility approximately 10 feet to the bathroom with no AD independently. Pt able to brush teeth standing at the sink and simulate toilet tx/bathing independently. Pt left in recliner with legs elevated, meal tray set, and RN notified.Maximum therapeutic gains met at current level of care and patient will be discharged from occupational therapy at this time.    Further Equipment Recommendations for Discharge: Pt does not need DME    [facility]: AM-PAC Inpatient Daily Activity Raw Score: 24      At this time and based on an AM-PAC score, no further OT is recommended upon discharge due to (i.e. patient at baseline functional status…etc…).  Recommend patient returns to prior setting with prior services.    This [facility] score should be considered in conjunction with interdisciplinary team recommendations to determine the most appropriate discharge setting. Patient's social support, diagnosis, medical stability, and prior level of function should also be taken into consideration.     SUBJECTIVE:   Patient stated “I can tell when I need to use the bathroom.”    OBJECTIVE DATA SUMMARY:     Past Medical History:   Diagnosis Date    Abnormal PFT 10/1/2015    Dr. Delano Tee, Pulmonology    Acid reflux     CAD (coronary  evidence. Asking the patient, friends/relatives and nurses are the usual sources, but direct observation and common sense are also important. However direct testing is not needed.  5. Usually the patient's performance over the preceding 24-48 hours is important, but occasionally longer periods will be relevant.  6. Middle categories imply that the patient supplies over 50 per cent of the effort.  7. Use of aids to be independent is allowed.     Score Interpretation (from Jem 1997)    Independent   60-79 Minimally independent   40-59 Partially dependent   20-39 Very dependent   <20 Totally dependent      -Lizeth Strickland, Barthel, D.W. (1965). Functional evaluation: the Barthel Index. Md [facility] Med J 142.  -SHASHANK Parish, VIOLA Ivey (1997). The Barthel activities of daily living index: self-reporting versus actual performance in the old (> or = 75 years). Journal of American Geriatric Society 45(7), 832-836.   -MARCE Pulido, SALINA Anand., Eliel, DANIEL., Gunner, A.J. (1999). Measuring the change in disability after inpatient rehabilitation; comparison of the responsiveness of the Barthel Index and Functional Miller Measure. Journal of Neurology, Neurosurgery, and Psychiatry, 66(4), 480-484.  -Solomon Francis, N.J.A, PEDRO Santoyo, & Reynaldo, M.A. (2004) Assessment of post-stroke quality of life in cost-effectiveness studies: The usefulness of the Barthel Index and the EuroQoL-5D. Quality of Life Research, 13, 130-44              Functional Outcome Measure:  The patient scored Total: 100/100 on the Barthel Index outcome measure which is indicative of being independent in basic self-care.     Pain:  Pain level pre-treatment: 0/10   Pain level post-treatment: 0/10   Pain Intervention(s): Rest, Ice, Repositioning   Response to intervention: Nurse notified    Activity Tolerance:   Activity Tolerance: Patient Tolerated treatment well  Please refer to the flowsheet for vital signs taken during

## 2024-03-14 NOTE — ED NOTES
TRANSFER - OUT REPORT:    Verbal report given to Christopher SAUCEDA on Jo Ann Rodriguez  being transferred to [id number] for routine progression of patient care       Report consisted of patient's Situation, Background, Assessment and   Recommendations(SBAR).     Information from the following report(s) ED SBAR, MAR, Cardiac Rhythm NSR, and Neuro Assessment was reviewed with the receiving nurse.    [place] Assessment:    Presents to emergency department  because of falls (Syncope, seizure, or loss of consciousness): No  Age > 70: No  Altered Mental Status, Intoxication with alcohol or substance confusion (Disorientation, impaired judgment, poor safety awaremess, or inability to follow instructions): No  Impaired Mobility: Ambulates or transfers with assistive devices or assistance; Unable to ambulate or transer.: No  Nursing Judgement: No          Lines:   Peripheral IV 03/14/24 Right Antecubital (Active)        Opportunity for questions and clarification was provided.      Patient transported with:  Monitor

## 2024-03-14 NOTE — H&P
No Known Problems Other        Social History     Socioeconomic History    Marital status:      Spouse name: None    Number of children: None    Years of education: None    Highest education level: None   Tobacco Use    Smoking status: Never    Smokeless tobacco: Never   Vaping Use    Vaping Use: Never used   Substance and Sexual Activity    Alcohol use: No     Alcohol/week: 1.0 standard drink of alcohol    Drug use: No    Sexual activity: Yes     Partners: Female     Birth control/protection: None     Social Determinants of Health     Financial Resource Strain: High Risk (8/29/2023)    Overall Financial Resource Strain (CARDIA)     Difficulty of Paying Living Expenses: Hard   Food Insecurity:   Recent Concern: Food Insecurity - Food Insecurity Present (8/29/2023)    Hunger Vital Sign     Worried About Running Out of Food in the Last Year: Often true     Ran Out of Food in the Last Year: Never true   Transportation Needs: Unknown (8/29/2023)    PRAPARE - Transportation     Lack of Transportation (Non-Medical): No   Housing Stability: Unknown (8/29/2023)    Housing Stability Vital Sign     Unstable Housing in the Last Year: No       Prior to Admission medications    Medication Sig Start Date End Date Taking? Authorizing Provider   magnesium oxide (MAG-OX) 400 MG tablet Take 1 tablet by mouth daily 3/9/24   Edwige Hanks MD   aspirin 81 MG EC tablet Take 1 tablet by mouth daily 3/9/24   Edwige Hanks MD   Semaglutide, 2 MG/DOSE, 8 MG/3ML SOPN Inject 2 mg into the skin once a week 1/3/24   Edwige Hanks MD   carBAMazepine (TEGRETOL) 200 MG tablet Take 1 tablet by mouth 2 times daily 11/25/23   Edwige Hanks MD   olmesartan-hydroCHLOROthiazide (BENICAR HCT) 20-12.5 MG per tablet Take 1 tablet by mouth daily  Patient not taking: Reported on 3/14/2024 10/15/23   Edwige Hanks MD   pantoprazole (PROTONIX) 40 MG tablet Take 1 tablet by mouth every morning (before breakfast) 8/3/23    Provider, MD Indira   hyoscyamine (LEVSIN/SL) 125 MCG sublingual tablet TAKE 1 UNDER THE TONGUE EVERY 4-6 HOURS AS NEEDED FOR ABDOMINAL PAIN OR CRAMPING 8/3/23   Indira Horn MD   gabapentin (NEURONTIN) 600 MG tablet  7/27/23   Indira Horn MD   metoprolol tartrate (LOPRESSOR) 25 MG tablet TAKE 0.5 TABLETS BY MOUTH TWICE A DAY 7/31/23   Priscilla Olivier, APRN - NP   Multiple Vitamins-Minerals (ONE-A-DAY WOMENS PO) Take 1 tablet by mouth daily    Indira Horn MD   atorvastatin (LIPITOR) 20 MG tablet Take 1 tablet by mouth daily 12/23/22   Automatic Reconciliation, Ar   DULoxetine (CYMBALTA) 60 MG extended release capsule Take 1 capsule by mouth daily 2/21/19   Automatic Reconciliation, Ar   mometasone (NASONEX) 50 MCG/ACT nasal spray 2 sprays by Nasal route daily 11/17/22   Automatic Reconciliation, Ar   nitroGLYCERIN (NITROSTAT) 0.4 MG SL tablet Place 1 tablet under the tongue every 5 minutes as needed for Chest pain 12/27/21   Automatic Reconciliation, Ar       Allergies   Allergen Reactions    Metformin Nausea Only    Naltrexone-Bupropion Hcl Er Other (See Comments)     Severe constipation         Review of Systems  GENERAL: Patient alert, awake and oriented times 3, able to communicate full sentences and not in distress. no weight loss, no falls.  HEENT: No change in vision, no earache, no tinnitus, no sore throat or sinus congestion.   NECK: No pain or stiffness.   PULMONARY: No shortness of breath, no cough or wheeze.   Cardiovascular: no pnd or orthopnea, no CP  GASTROINTESTINAL: No abdominal pain, no nausea, no vomiting or diarrhea, no melena or bright red blood per rectum.   GENITOURINARY: No urinary frequency, no urgency, no hesitancy or dysuria.   MUSCULOSKELETAL: No joint or muscle pain, no back pain, no recent trauma.   DERMATOLOGIC: No rash, no itching, no lesions.   ENDOCRINE: No polyuria, no polydipsia, no heat or cold intolerance. No recent change in weight.

## 2024-03-15 ENCOUNTER — APPOINTMENT (OUTPATIENT)
Facility: HOSPITAL | Age: 54
End: 2024-03-15
Attending: INTERNAL MEDICINE
Payer: COMMERCIAL

## 2024-03-15 VITALS
RESPIRATION RATE: 18 BRPM | TEMPERATURE: 97.9 F | WEIGHT: 254.63 LBS | SYSTOLIC BLOOD PRESSURE: 123 MMHG | DIASTOLIC BLOOD PRESSURE: 82 MMHG | BODY MASS INDEX: 45.12 KG/M2 | HEART RATE: 76 BPM | OXYGEN SATURATION: 95 % | HEIGHT: 63 IN

## 2024-03-15 LAB
ALBUMIN SERPL-MCNC: 3.4 G/DL (ref 3.4–5)
ALBUMIN/GLOB SERPL: 0.8 (ref 0.8–1.7)
ALP SERPL-CCNC: 238 U/L (ref 45–117)
ALT SERPL-CCNC: 47 U/L (ref 13–56)
ANION GAP SERPL CALC-SCNC: 7 MMOL/L (ref 3–18)
AST SERPL-CCNC: 43 U/L (ref 10–38)
BASOPHILS # BLD: 0 K/UL (ref 0–0.1)
BASOPHILS NFR BLD: 0 % (ref 0–2)
BILIRUB SERPL-MCNC: 0.5 MG/DL (ref 0.2–1)
BUN SERPL-MCNC: 11 MG/DL (ref 7–18)
BUN/CREAT SERPL: 14 (ref 12–20)
CALCIUM SERPL-MCNC: 9.4 MG/DL (ref 8.5–10.1)
CHLORIDE SERPL-SCNC: 100 MMOL/L (ref 100–111)
CO2 SERPL-SCNC: 26 MMOL/L (ref 21–32)
CREAT SERPL-MCNC: 0.8 MG/DL (ref 0.6–1.3)
DIFFERENTIAL METHOD BLD: NORMAL
EOSINOPHIL # BLD: 0.1 K/UL (ref 0–0.4)
EOSINOPHIL NFR BLD: 1 % (ref 0–5)
ERYTHROCYTE [DISTWIDTH] IN BLOOD BY AUTOMATED COUNT: 13.3 % (ref 11.6–14.5)
GLOBULIN SER CALC-MCNC: 4.1 G/DL (ref 2–4)
GLUCOSE SERPL-MCNC: 94 MG/DL (ref 74–99)
HCT VFR BLD AUTO: 37.5 % (ref 35–45)
HGB BLD-MCNC: 12.6 G/DL (ref 12–16)
IMM GRANULOCYTES # BLD AUTO: 0 K/UL (ref 0–0.04)
IMM GRANULOCYTES NFR BLD AUTO: 0 % (ref 0–0.5)
LYMPHOCYTES # BLD: 1.9 K/UL (ref 0.9–3.6)
LYMPHOCYTES NFR BLD: 24 % (ref 21–52)
MCH RBC QN AUTO: 29 PG (ref 24–34)
MCHC RBC AUTO-ENTMCNC: 33.6 G/DL (ref 31–37)
MCV RBC AUTO: 86.2 FL (ref 78–100)
MONOCYTES # BLD: 0.5 K/UL (ref 0.05–1.2)
MONOCYTES NFR BLD: 7 % (ref 3–10)
NEUTS SEG # BLD: 5.2 K/UL (ref 1.8–8)
NEUTS SEG NFR BLD: 67 % (ref 40–73)
NRBC # BLD: 0 K/UL (ref 0–0.01)
NRBC BLD-RTO: 0 PER 100 WBC
PLATELET # BLD AUTO: 167 K/UL (ref 135–420)
PMV BLD AUTO: 11.8 FL (ref 9.2–11.8)
POTASSIUM SERPL-SCNC: 5 MMOL/L (ref 3.5–5.5)
PROT SERPL-MCNC: 7.5 G/DL (ref 6.4–8.2)
RBC # BLD AUTO: 4.35 M/UL (ref 4.2–5.3)
SODIUM SERPL-SCNC: 133 MMOL/L (ref 136–145)
WBC # BLD AUTO: 7.7 K/UL (ref 4.6–13.2)

## 2024-03-15 PROCEDURE — 6370000000 HC RX 637 (ALT 250 FOR IP): Performed by: INTERNAL MEDICINE

## 2024-03-15 PROCEDURE — 99239 HOSP IP/OBS DSCHRG MGMT >30: CPT | Performed by: INTERNAL MEDICINE

## 2024-03-15 PROCEDURE — 36415 COLL VENOUS BLD VENIPUNCTURE: CPT

## 2024-03-15 PROCEDURE — 94761 N-INVAS EAR/PLS OXIMETRY MLT: CPT

## 2024-03-15 PROCEDURE — 80053 COMPREHEN METABOLIC PANEL: CPT

## 2024-03-15 PROCEDURE — 85025 COMPLETE CBC W/AUTO DIFF WBC: CPT

## 2024-03-15 PROCEDURE — 2580000003 HC RX 258: Performed by: INTERNAL MEDICINE

## 2024-03-15 RX ORDER — OLMESARTAN MEDOXOMIL AND HYDROCHLOROTHIAZIDE 20/12.5 20; 12.5 MG/1; MG/1
1 TABLET ORAL DAILY
Qty: 90 TABLET | Refills: 1 | Status: SHIPPED | OUTPATIENT
Start: 2024-03-15

## 2024-03-15 RX ORDER — MAGNESIUM OXIDE 400 MG/1
1 TABLET ORAL DAILY
Qty: 90 TABLET | Refills: 3 | Status: SHIPPED | OUTPATIENT
Start: 2024-03-15

## 2024-03-15 RX ADMIN — PANTOPRAZOLE SODIUM 40 MG: 40 TABLET, DELAYED RELEASE ORAL at 06:10

## 2024-03-15 RX ADMIN — ATORVASTATIN CALCIUM 20 MG: 20 TABLET, FILM COATED ORAL at 08:57

## 2024-03-15 RX ADMIN — ASPIRIN 81 MG: 81 TABLET, COATED ORAL at 08:49

## 2024-03-15 RX ADMIN — DULOXETINE HYDROCHLORIDE 60 MG: 60 CAPSULE, DELAYED RELEASE ORAL at 08:49

## 2024-03-15 RX ADMIN — GABAPENTIN 600 MG: 300 CAPSULE ORAL at 08:49

## 2024-03-15 RX ADMIN — Medication 400 MG: at 08:50

## 2024-03-15 RX ADMIN — SODIUM CHLORIDE, PRESERVATIVE FREE 10 ML: 5 INJECTION INTRAVENOUS at 08:56

## 2024-03-15 RX ADMIN — METOPROLOL TARTRATE 12.5 MG: 25 TABLET, FILM COATED ORAL at 08:50

## 2024-03-15 RX ADMIN — LOSARTAN POTASSIUM 50 MG: 50 TABLET, FILM COATED ORAL at 08:50

## 2024-03-15 RX ADMIN — HYDROCHLOROTHIAZIDE 12.5 MG: 25 TABLET ORAL at 08:50

## 2024-03-15 RX ADMIN — CARBAMAZEPINE 200 MG: 200 TABLET ORAL at 08:49

## 2024-03-15 ASSESSMENT — PAIN SCALES - GENERAL
PAINLEVEL_OUTOF10: 0

## 2024-03-15 NOTE — PLAN OF CARE
Problem: Discharge Planning  Goal: Discharge to home or other facility with appropriate resources  Outcome: Progressing  Flowsheets (Taken 3/14/2024 2000)  Discharge to home or other facility with appropriate resources:   Identify barriers to discharge with patient and caregiver   Arrange for needed discharge resources and transportation as appropriate     Problem: Safety - Adult  Goal: Free from fall injury  Outcome: Progressing     Problem: Skin/Tissue Integrity  Goal: Absence of new skin breakdown  Description: 1.  Monitor for areas of redness and/or skin breakdown  2.  Assess vascular access sites hourly  3.  Every 4-6 hours minimum:  Change oxygen saturation probe site  4.  Every 4-6 hours:  If on nasal continuous positive airway pressure, respiratory therapy assess nares and determine need for appliance change or resting period.  Outcome: Progressing     Problem: Chronic Conditions and Co-morbidities  Goal: Patient's chronic conditions and co-morbidity symptoms are monitored and maintained or improved  Outcome: Progressing  Flowsheets (Taken 3/14/2024 2000)  Care Plan - Patient's Chronic Conditions and Co-Morbidity Symptoms are Monitored and Maintained or Improved: Monitor and assess patient's chronic conditions and comorbid symptoms for stability, deterioration, or improvement     Problem: Pain  Goal: Verbalizes/displays adequate comfort level or baseline comfort level  Outcome: Progressing

## 2024-03-15 NOTE — DISCHARGE INSTRUCTIONS
Hello you came in for headache. We think this was migraine related vs a alternate type of headache. You have metoprolol and Magnesium to go home with. Take care.

## 2024-03-15 NOTE — PROGRESS NOTES
Pt stable for discharge.  Given copies of instruction for discharge and medication  at prescribed pharmacy.  IV and armband removed.  Pt wheeled out to car with family.

## 2024-03-15 NOTE — CARE COORDINATION
D/C order noted for today. Orders reviewed. No needs identified at this time. Patient's family to transport patient home today for discharge. CM remains available if needed.             Mary Jacques, [phone]

## 2024-03-18 ENCOUNTER — CLINICAL DOCUMENTATION (OUTPATIENT)
Age: 54
End: 2024-03-18

## 2024-03-18 NOTE — TELEPHONE ENCOUNTER
Care Transitions Initial Follow Up Call    Outreach made within 2 business days of discharge: No    Patient: Jo Ann Rodriguez Patient : 1970   MRN: [id number]  Reason for Admission: There are no discharge diagnoses documented for the most recent discharge.  Discharge Date: 3/15/24       Spoke with: Patient 2024    Discharge department/facility: [facility] 2024 -03/15/2024 for elevated Blood Pressure and headaches     TCM Interactive Patient Contact:  Was patient able to fill all prescriptions: Yes  Was patient instructed to bring all medications to the follow-up visit: Yes  Is patient taking all medications as directed in the discharge summary? Yes  Does patient understand their discharge instructions: Yes  Does patient have questions or concerns that need addressed prior to 7-14 day follow up office visit: Yes, Patient inquiring in regards to Blood pressure medication changes to maybe increase due to severe headaches (olmesartan-hydroCHLOROthiazide (BENICAR HCT) 20-12.5 MG per tablet- daily &   metoprolol tartrate (LOPRESSOR) 25 MG tablet TAKE 0.5 TABLETS BY MOUTH TWICE A DAY).     Scheduled appointment with PCP within 7-14 days    Follow Up  Future Appointments   Date Time Provider Department Center   3/20/2024  2:20 PM Edwige Hanks MD [facility] BS AMB   2024  8:15 AM [facility] LAB [facility] AMB   2024  1:20 PM Edwige Hanks MD [facility] BS AMB   2024 10:00 AM Gómez Beltran MD [facility] BS AMB       Ana Schneider MA

## 2024-03-18 NOTE — PROGRESS NOTES
PATIENT REQUIRES TCM OUTREACH    MRN: [id number]     PATIENT:  Jo Ann Rodriguez    ADMIT DATE:  3/15/24    DISCHARGE DATE:  3/15/24     ADMITTING DX: CVA workup/migraine    MEDICATION CHANGES:   Start:   Stop:   Change:    SPECIALISTS:  Neuro    HOME HEALTH/WOUND CARE/PT/OT:  none    SCHEDULED FOLLOW UP APPTS:    Future Appointments   Date Time Provider Department Center   4/29/2024  8:15 AM [facility] LAB [facility] BS AMB   5/6/2024  1:20 PM Edwige Hanks MD [facility] BS AMB   7/17/2024 10:00 AM Gómez Beltran MD [facility] BS AMB       *Please contact patient within 2 business days and document under .TCMOFFICE.  A scheduled Hospital Follow-up for patient within 7 days is preferred*

## 2024-03-20 ENCOUNTER — OFFICE VISIT (OUTPATIENT)
Age: 54
End: 2024-03-20
Payer: COMMERCIAL

## 2024-03-20 VITALS
WEIGHT: 251.6 LBS | HEART RATE: 68 BPM | HEIGHT: 63 IN | SYSTOLIC BLOOD PRESSURE: 100 MMHG | TEMPERATURE: 97.3 F | DIASTOLIC BLOOD PRESSURE: 73 MMHG | BODY MASS INDEX: 44.58 KG/M2 | RESPIRATION RATE: 16 BRPM | OXYGEN SATURATION: 95 %

## 2024-03-20 DIAGNOSIS — G43.711 INTRACTABLE CHRONIC MIGRAINE WITHOUT AURA AND WITH STATUS MIGRAINOSUS: Primary | ICD-10-CM

## 2024-03-20 PROCEDURE — 99214 OFFICE O/P EST MOD 30 MIN: CPT | Performed by: FAMILY MEDICINE

## 2024-03-20 PROCEDURE — 3078F DIAST BP <80 MM HG: CPT | Performed by: FAMILY MEDICINE

## 2024-03-20 PROCEDURE — 3074F SYST BP LT 130 MM HG: CPT | Performed by: FAMILY MEDICINE

## 2024-03-20 RX ORDER — ATORVASTATIN CALCIUM 20 MG/1
20 TABLET, FILM COATED ORAL DAILY
Qty: 90 TABLET | Refills: 3 | OUTPATIENT
Start: 2024-03-20

## 2024-03-20 RX ORDER — BUTALBITAL, ACETAMINOPHEN AND CAFFEINE 50; 325; 40 MG/1; MG/1; MG/1
1 TABLET ORAL EVERY 8 HOURS PRN
Qty: 30 TABLET | Refills: 0 | Status: SHIPPED | OUTPATIENT
Start: 2024-03-20

## 2024-03-20 RX ORDER — ATORVASTATIN CALCIUM 20 MG/1
20 TABLET, FILM COATED ORAL DAILY
Qty: 90 TABLET | Refills: 3 | Status: SHIPPED | OUTPATIENT
Start: 2024-03-20

## 2024-03-20 NOTE — TELEPHONE ENCOUNTER
Medication refill request for Lipitor to be filled at appointment time today, 03/20/2024, thank you

## 2024-03-20 NOTE — PROGRESS NOTES
HPI:  Jo Ann Rodriguez is a 54 y.o. female who presents today with   Chief Complaint   Patient presents with    ED Follow-up     03/14/2024 -  03/15/2024 at [facility]: CVA (cerebrovascular accident due to intracerebral hemorrhage) ([facility])    Discuss Medications     Possible medication change or dose change for Migraines        Patient first seen and assessed by student Dr. Dawson, EVMS - M3      Patient presents today with 1 week history of episodic throbbing headache. Pt states that last Wednesday, she woke up with a headache that began on the crown of her head. She describes the pain as a throbbing, intermittent headache with a severity of 10/10. She described this headache as different from the baseline headaches she typically experiences. She denied any photophobia that day, denies N/V. She managed the headache with OTC tylenol and Excedrin with no relief. On Thursday, the headache continued to occur and she had taken her BP at home and saw that she had a BP of 178/106 which prompted her to go to the emergency room.  Her working diagnosis was that of a CVA/TIA but her discharge diagnosis was migraine.  The discharge summary was reviewed.    At the ER, EKG, CT, and blood work was done which showed no abnormalities.     Her CTA revealed: high-grade stenosis at the origin the right vertebral artery, which  effectively terminates in the right PICA, normal variant.    Since discharge, patient has been experiencing intermittent headaches that migrates and waxes and wanes in severity. Today, the pain is a 6/10. She currently denies n/v, fever, vision changes, or focal neurological deficits. Patient also endorsed at-home BP readings 150s/90s.     . She sees Neurology. Neurology stopped cymbalta    She has also had some intermittent knee pain.  She has had no injury.  There is no significant swelling.         No data to display                        PMH,  Meds, Allergies, Family History, Social history reviewed      Current

## 2024-03-20 NOTE — PROGRESS NOTES
HPI:  Jo Ann Rodriguez is a 54 y.o. female who presents today with   Chief Complaint   Patient presents with    ED Follow-up     03/14/2024 -  03/15/2024 at [facility]: CVA (cerebrovascular accident due to intracerebral hemorrhage) (HCC)    Discuss Medications     Possible medication change or dose change for Migraines        {[id number]}           Wt Readings from Last 3 Encounters:   03/20/24 114.1 kg (251 lb 9.6 oz)   03/15/24 115.5 kg (254 lb 10.1 oz)   01/24/24 117.9 kg (260 lb)            No data to display                        PMH,  Meds, Allergies, Family History, Social history reviewed      Current Outpatient Medications   Medication Sig Dispense Refill    magnesium oxide (MAG-OX) 400 MG tablet Take 1 tablet by mouth daily 90 tablet 3    olmesartan-hydroCHLOROthiazide (BENICAR HCT) 20-12.5 MG per tablet Take 1 tablet by mouth daily 90 tablet 1    aspirin 81 MG EC tablet Take 1 tablet by mouth daily 90 tablet 1    Semaglutide, 2 MG/DOSE, 8 MG/3ML SOPN Inject 2 mg into the skin once a week 3 mL 4    carBAMazepine (TEGRETOL) 200 MG tablet Take 1 tablet by mouth 2 times daily 60 tablet 3    pantoprazole (PROTONIX) 40 MG tablet Take 1 tablet by mouth every morning (before breakfast)      hyoscyamine (LEVSIN/SL) 125 MCG sublingual tablet TAKE 1 UNDER THE TONGUE EVERY 4-6 HOURS AS NEEDED FOR ABDOMINAL PAIN OR CRAMPING      gabapentin (NEURONTIN) 600 MG tablet       metoprolol tartrate (LOPRESSOR) 25 MG tablet TAKE 0.5 TABLETS BY MOUTH TWICE A DAY 90 tablet 3    Multiple Vitamins-Minerals (ONE-A-DAY WOMENS PO) Take 1 tablet by mouth daily      atorvastatin (LIPITOR) 20 MG tablet Take 1 tablet by mouth daily      DULoxetine (CYMBALTA) 60 MG extended release capsule Take 1 capsule by mouth daily      mometasone (NASONEX) 50 MCG/ACT nasal spray 2 sprays by Nasal route daily      nitroGLYCERIN (NITROSTAT) 0.4 MG SL tablet Place 1 tablet under the tongue every 5 minutes as needed for Chest pain       No current

## 2024-03-20 NOTE — PROGRESS NOTES
1. \"Have you been to the ER, urgent care clinic since your last visit?  Hospitalized since your last visit?\" Yes, 03/14/2024 -  03/15/2024 at [facility]: CVA (cerebrovascular accident due to intracerebral hemorrhage) (HCC)    2. \"Have you seen or consulted any other health care providers outside of the [facility] since your last visit?\"  Yes, [facility]      3. For patients aged 45-75: Has the patient had a colonoscopy / FIT/ Cologuard? Yes - Care Gap present. Most recent result on file      If the patient is female:    4. For patients aged 40-74: Has the patient had a mammogram within the past 2 years? Yes - Care Gap present. Most recent result on file      5. For patients aged 21-65: Has the patient had a pap smear? Yes - Care Gap present. Most recent result on file

## 2024-03-21 ENCOUNTER — TELEPHONE (OUTPATIENT)
Age: 54
End: 2024-03-21

## 2024-03-21 NOTE — TELEPHONE ENCOUNTER
Medina from [facility]  left contact information states can reach out to her anytime if any assistance is required.     Medina Contact [phone]

## 2024-03-21 NOTE — PROGRESS NOTES
Instructions for your procedure at Critical access hospital      Today's Date: 3/21/2024      Patient's Name: Jo Ann Rodriguez      Procedure Date: April 1        Please enter the main entrance of the hospital and check-in at the  located in the lobby.      Do NOT eat or drink anything, including candy, gum, or ice chips after midnight prior to your procedure, unless it is part of your prep.  Brush your teeth before coming to the hospital.You may swish with water, but do not swallow.  No smoking/Vaping/E-Cigarettes 24 hours prior to the day of procedure.  No alcohol 24 hours prior to the day of procedure.  No recreational drugs for one week prior to the day of procedure.  Bring Photo ID, Insurance information, and Co-pay if required on day of procedure.  Bring in pertinent legal documents, such as, Medical Power of , DNR, Advance Directive, etc.  Leave all other valuables, including money/purse, at home.  Remove jewelry, including ALL body piercings, nail polish, acrylic nails, and makeup (including mascara); no lotions, powders, deodorant, and/or perfume/cologne/after shave on the skin.  Glasses and dentures may be worn to the hospital.  They must be removed prior to procedure. Please bring case/container for glasses or dentures.  11. Contacts should not be worn on day of procedure.   12. Call the office ([phone]) if you have symptoms of a cold or illness within 24-48 hours prior to your procedure.   13. AN ADULT (relative or friend 18 years or older) MUST DRIVE YOU HOME AFTER YOUR PROCEDURE.   14. Please make arrangements for a responsible adult (18 years or older) to be with you for 24 hours after your procedure.   15. ONE VISITOR will be allowed in the waiting area during your procedure.       Special Instructions:      Bring list of CURRENT medications.  Follow instructions from the office regarding Bowel Prep, Vitamins, Iron, Blood Thinners, Insulin, Seizure, and Blood

## 2024-03-23 NOTE — DISCHARGE SUMMARY
Discharge Summary    Patient: Jo Ann Rodriguez MRN: [id number]  CSN: [id number]    YOB: 1970  Age: 54 y.o.  Sex: female    DOA: 3/14/2024 LOS:  LOS: 1 day   Discharge Date: 3/15/2024     Admission Diagnosis: CVA (cerebrovascular accident due to intracerebral hemorrhage) ([facility]) [I61.9]  Cerebrovascular accident (CVA), unspecified mechanism (HCC) [I63.9]  TIA (transient ischemic attack) [G45.9]    Discharge Diagnosis:      Discharge Condition: Stable    PHYSICAL EXAM  Visit Vitals  /82   Pulse 76   Temp 97.9 °F (36.6 °C) (Oral)   Resp 18   Ht 1.6 m (5' 3\")   Wt 115.5 kg (254 lb 10.1 oz)   SpO2 95%   BMI 45.11 kg/m²       General:  Alert, cooperative, no distress, appears stated age.              Head: Normocephalic, without obvious abnormality, atraumatic.   Eyes:  Conjunctivae/corneas clear. PERRL, EOMs intact.   Nose: Nares normal. No drainage or sinus tenderness.   Neck: Supple, symmetrical, trachea midline, no adenopathy, thyroid: no enlargement, no carotid bruit and no JVD.   Lungs:   Clear to auscultation bilaterally.   Heart:  Regular rate and rhythm, S1, S2 normal.     Abdomen: Soft, non-tender. Bowel sounds normal.    Extremities: Extremities normal, atraumatic, no cyanosis or edema.   Pulses: 2+ and symmetric all extremities.   Skin:  No rashes or lesions   Neurologic: AAOx3, muscle strength is 5 out of 5 in the upper and lower extremities bilaterally.  No sensation deficits.  Cranial nerves intact 2 through 12.       Hospital Course: Per the H&P:Jo Ann Rodrgiuez is a 54 y.o.  female with a pmh of HTN, type 2 DM, GERD, HLD, CAD s/p CABG in 2011, Trigeminal neuralgia who presents for headache.  Patient reports she was sleeping and awoke to a severe headache. The headache was \"all over her head\". She did not have any associated nausea or vomiting. No vision changes. No chest pain. She did have associated photophobia. She also reported some dizziness along with feeling like

## 2024-03-29 ENCOUNTER — ANESTHESIA EVENT (OUTPATIENT)
Facility: HOSPITAL | Age: 54
End: 2024-03-29
Payer: COMMERCIAL

## 2024-04-01 ENCOUNTER — HOSPITAL ENCOUNTER (OUTPATIENT)
Facility: HOSPITAL | Age: 54
Setting detail: OUTPATIENT SURGERY
Discharge: HOME | End: 2024-04-01
Attending: INTERNAL MEDICINE | Admitting: INTERNAL MEDICINE
Payer: COMMERCIAL

## 2024-04-01 ENCOUNTER — ANESTHESIA (OUTPATIENT)
Facility: HOSPITAL | Age: 54
End: 2024-04-01
Payer: COMMERCIAL

## 2024-04-01 VITALS
OXYGEN SATURATION: 99 % | HEIGHT: 63 IN | HEART RATE: 66 BPM | RESPIRATION RATE: 19 BRPM | SYSTOLIC BLOOD PRESSURE: 117 MMHG | TEMPERATURE: 97.5 F | WEIGHT: 252.3 LBS | DIASTOLIC BLOOD PRESSURE: 61 MMHG | BODY MASS INDEX: 44.7 KG/M2

## 2024-04-01 LAB — HCG UR QL: NEGATIVE

## 2024-04-01 PROCEDURE — 2580000003 HC RX 258: Performed by: NURSE ANESTHETIST, CERTIFIED REGISTERED

## 2024-04-01 PROCEDURE — 88305 TISSUE EXAM BY PATHOLOGIST: CPT

## 2024-04-01 PROCEDURE — 7100000000 HC PACU RECOVERY - FIRST 15 MIN: Performed by: INTERNAL MEDICINE

## 2024-04-01 PROCEDURE — 6360000002 HC RX W HCPCS: Performed by: STUDENT IN AN ORGANIZED HEALTH CARE EDUCATION/TRAINING PROGRAM

## 2024-04-01 PROCEDURE — 2500000003 HC RX 250 WO HCPCS: Performed by: STUDENT IN AN ORGANIZED HEALTH CARE EDUCATION/TRAINING PROGRAM

## 2024-04-01 PROCEDURE — 81025 URINE PREGNANCY TEST: CPT

## 2024-04-01 PROCEDURE — 3600007512: Performed by: INTERNAL MEDICINE

## 2024-04-01 PROCEDURE — 3700000000 HC ANESTHESIA ATTENDED CARE: Performed by: INTERNAL MEDICINE

## 2024-04-01 PROCEDURE — 7100000010 HC PHASE II RECOVERY - FIRST 15 MIN: Performed by: INTERNAL MEDICINE

## 2024-04-01 PROCEDURE — 2709999900 HC NON-CHARGEABLE SUPPLY: Performed by: INTERNAL MEDICINE

## 2024-04-01 PROCEDURE — 3600007502: Performed by: INTERNAL MEDICINE

## 2024-04-01 PROCEDURE — 3700000001 HC ADD 15 MINUTES (ANESTHESIA): Performed by: INTERNAL MEDICINE

## 2024-04-01 RX ORDER — SODIUM CHLORIDE, SODIUM LACTATE, POTASSIUM CHLORIDE, CALCIUM CHLORIDE 600; 310; 30; 20 MG/100ML; MG/100ML; MG/100ML; MG/100ML
INJECTION, SOLUTION INTRAVENOUS CONTINUOUS
Status: DISCONTINUED | OUTPATIENT
Start: 2024-04-01 | End: 2024-04-01 | Stop reason: HOSPADM

## 2024-04-01 RX ORDER — LIDOCAINE HYDROCHLORIDE 10 MG/ML
1 INJECTION, SOLUTION EPIDURAL; INFILTRATION; INTRACAUDAL; PERINEURAL
Status: DISCONTINUED | OUTPATIENT
Start: 2024-04-01 | End: 2024-04-01 | Stop reason: HOSPADM

## 2024-04-01 RX ORDER — INSULIN LISPRO 100 [IU]/ML
1-15 INJECTION, SOLUTION INTRAVENOUS; SUBCUTANEOUS ONCE
Status: DISCONTINUED | OUTPATIENT
Start: 2024-04-01 | End: 2024-04-01 | Stop reason: HOSPADM

## 2024-04-01 RX ORDER — LIDOCAINE HYDROCHLORIDE 20 MG/ML
INJECTION, SOLUTION EPIDURAL; INFILTRATION; INTRACAUDAL; PERINEURAL PRN
Status: DISCONTINUED | OUTPATIENT
Start: 2024-04-01 | End: 2024-04-01 | Stop reason: SDUPTHER

## 2024-04-01 RX ORDER — GLYCOPYRROLATE 0.2 MG/ML
INJECTION INTRAMUSCULAR; INTRAVENOUS PRN
Status: DISCONTINUED | OUTPATIENT
Start: 2024-04-01 | End: 2024-04-01 | Stop reason: SDUPTHER

## 2024-04-01 RX ORDER — DEXTROSE MONOHYDRATE 100 MG/ML
INJECTION, SOLUTION INTRAVENOUS CONTINUOUS PRN
Status: DISCONTINUED | OUTPATIENT
Start: 2024-04-01 | End: 2024-04-01 | Stop reason: HOSPADM

## 2024-04-01 RX ORDER — PROPOFOL 10 MG/ML
INJECTION, EMULSION INTRAVENOUS PRN
Status: DISCONTINUED | OUTPATIENT
Start: 2024-04-01 | End: 2024-04-01 | Stop reason: SDUPTHER

## 2024-04-01 RX ADMIN — PROPOFOL 20 MG: 10 INJECTION, EMULSION INTRAVENOUS at 14:15

## 2024-04-01 RX ADMIN — LIDOCAINE HYDROCHLORIDE 40 MG: 20 INJECTION, SOLUTION EPIDURAL; INFILTRATION; INTRACAUDAL; PERINEURAL at 14:10

## 2024-04-01 RX ADMIN — PROPOFOL 100 MG: 10 INJECTION, EMULSION INTRAVENOUS at 14:10

## 2024-04-01 RX ADMIN — SODIUM CHLORIDE, POTASSIUM CHLORIDE, SODIUM LACTATE AND CALCIUM CHLORIDE: 600; 310; 30; 20 INJECTION, SOLUTION INTRAVENOUS at 11:58

## 2024-04-01 RX ADMIN — LIDOCAINE HYDROCHLORIDE 60 MG: 20 INJECTION, SOLUTION EPIDURAL; INFILTRATION; INTRACAUDAL; PERINEURAL at 14:12

## 2024-04-01 RX ADMIN — GLYCOPYRROLATE 0.1 MG: 0.2 INJECTION INTRAMUSCULAR; INTRAVENOUS at 14:04

## 2024-04-01 RX ADMIN — PROPOFOL 50 MG: 10 INJECTION, EMULSION INTRAVENOUS at 14:11

## 2024-04-01 ASSESSMENT — PAIN SCALES - GENERAL
PAINLEVEL_OUTOF10: 0
PAINLEVEL_OUTOF10: 0

## 2024-04-01 ASSESSMENT — PAIN - FUNCTIONAL ASSESSMENT
PAIN_FUNCTIONAL_ASSESSMENT: 0-10
PAIN_FUNCTIONAL_ASSESSMENT: 0-10

## 2024-04-01 NOTE — ANESTHESIA PRE PROCEDURE
Department of Anesthesiology  Preprocedure Note       Name:  Jo Ann Rodriguez   Age:  54 y.o.  :  1970                                          MRN:  [id number]         Date:  2024      Surgeon: Surgeon(s):  Rolo Morales MD    Procedure: Procedure(s):  ESOPHAGOGASTRODUODENOSCOPY DILATION    Medications prior to admission:   Prior to Admission medications    Medication Sig Start Date End Date Taking? Authorizing Provider   atorvastatin (LIPITOR) 20 MG tablet Take 1 tablet by mouth daily  Patient taking differently: Take 1 tablet by mouth nightly 3/20/24   Edwige Hanks MD   butalbital-acetaminophen-caffeine (FIORICET, ESGIC) -40 MG per tablet Take 1 tablet by mouth every 8 hours as needed for Headaches or Migraine 3/20/24   Edwige Hanks MD   magnesium oxide (MAG-OX) 400 MG tablet Take 1 tablet by mouth daily 3/15/24   Vitaly Mcfarland DO   olmesartan-hydroCHLOROthiazide (BENICAR HCT) 20-12.5 MG per tablet Take 1 tablet by mouth daily 3/15/24   Vitaly Mcfarland DO   aspirin 81 MG EC tablet Take 1 tablet by mouth daily 3/9/24   Edwige Hanks MD   Semaglutide, 2 MG/DOSE, 8 MG/3ML SOPN Inject 2 mg into the skin once a week  Patient taking differently: Inject 0.75 mLs into the skin once a week Every Adalid 1/3/24   Edwige Hanks MD   carBAMazepine (TEGRETOL) 200 MG tablet Take 1 tablet by mouth 2 times daily 23   Edwige Hanks MD   pantoprazole (PROTONIX) 40 MG tablet Take 1 tablet by mouth every morning (before breakfast) 8/3/23   Indira Horn MD   hyoscyamine (LEVSIN/SL) 125 MCG sublingual tablet TAKE 1 UNDER THE TONGUE EVERY 4-6 HOURS AS NEEDED FOR ABDOMINAL PAIN OR CRAMPING 8/3/23   Indira Horn MD   gabapentin (NEURONTIN) 600 MG tablet Take 1 tablet by mouth 3 times daily. 23   Indira Horn MD   metoprolol tartrate (LOPRESSOR) 25 MG tablet TAKE 0.5 TABLETS BY MOUTH TWICE A DAY 23   Priscilla Olivier APRN - NP   Multiple

## 2024-04-01 NOTE — BRIEF OP NOTE
[phone]    [facility]  [address] [place] [address]      Brief Procedure Note    Jo Ann Rodriguez  1970  [id number]    Date of Procedure: 4/1/2024     Preoperative diagnosis: Esophageal dysphagia [R13.19]  Diaphragmatic hernia without obstruction and without gangrene [K44.9]  Esophageal dysmotility [K22.4]  Irritable bowel syndrome, unspecified type [K58.9]  Screening for colorectal cancer [Z12.11, Z12.12]  BMI 45.0-49.9, adult (HCC) [Z68.42]    Postoperative diagnosis: Esophageal dysphagia [R13.19]  Diaphragmatic hernia without obstruction and without gangrene [K44.9]  Esophageal dysmotility [K22.4]  Irritable bowel syndrome, unspecified type [K58.9]  Screening for colorectal cancer [Z12.11, Z12.12]  BMI 45.0-49.9, adult (HCC) [Z68.42]  Esophagitis biosies done. Dilated with Ruano 52 fr  Type of Anesthesia: MAC (Monitored anesthesia care)    Description of findings: same as post op dx    Procedure: Procedure(s):  ESOPHAGOGASTRODUODENOSCOPY DILATION and bx's    :  Dr. Rolo Morales MD    Assistant(s): Circulator: Evelin Ponce RN  Endoscopy Technician: Sanaz Perez    Devices/implants/grafts/tissues/prosthesis: None    EBL:None    Specimens:   ID Type Source Tests Collected by Time Destination   A : Esophageal bx's Tissue Esophagus SURGICAL PATHOLOGY Rolo Morales MD 4/1/2024 1416        Findings: See printed and scanned procedure note    Complications: None    Dr. Rolo Morales MD  4/1/2024  2:24 PM

## 2024-04-01 NOTE — H&P
Chief Complaint: Dysphagia.    History of present illness: No new complaints today.    PMH:   Past Medical History:   Diagnosis Date    Abnormal PFT 10/1/2015    Dr. Delano Tee, Pulmonology    Acid reflux     CAD (coronary artery disease)     Coronary atherosclerosis of native coronary artery     Diabetes mellitus (formerly [facility]) 9/22/14    hgbA1C 6.8    Diabetic eye exam (formerly [facility]) 2016    Dyslipidemia     Echocardiogram abnormal 07/28/2015    Tech difficult.  EF 50-55%.  No WMA.  Mild conc LVH.  Gr 2 DDfx.  RVSP 45-50 mmHg.  Mild LAE.  Mild LUPE.  Mod TR.  Mild IVCE.      GERD (gastroesophageal reflux disease)     H/O screening mammography 12/06/2016    no evidence of malignancy    History of myocardial perfusion scan 11/23/2016    High risk.  Mainly fixed anteroapical defect w/partial reversibility.  EF 60%.  No RWMA.  Nondiagnostic EKG on pharm stress test.    Hypercholesterolemia     Hypertension     Hypomagnesemia 8/14/14    1.4    Migraine     PAD (peripheral artery disease) (formerly [facility]) 01/03/2014    No significant occlusive arterial disease bilaterally.    Pharyngoesophageal dysphagia     Pleural effusion, left     s/p CABG and thoracentesis with removal of 900 mL    Pulmonary arterial hypertension (formerly [facility])     S/P CABG x 1 12/31/2011    LIMA-LAD    S/P cardiac cath 11/23/2016    R dom.  oLAD 85%.  Otherwise patent coronaries.  JORDAN to LAD patent.  LVEDP 22 mmHg.  EF 55%.  No RWMA.      Trigeminal neuralgia of left side of face     per medical notes epic     Allergies:   Allergies   Allergen Reactions    Metformin Nausea Only    Naltrexone-Bupropion Hcl Er Other (See Comments)     Severe constipation     Medications:   Current Facility-Administered Medications:     lidocaine PF 1 % injection 1 mL, 1 mL, IntraDERmal, Once PRN, Medina Nunez APRN - CRNA    famotidine (PEPCID) 20 mg in sodium chloride (PF) 0.9 % 10 mL injection, 20 mg, IntraVENous, Once, Medina Nunez APRN - CRNA    lactated ringers IV soln infusion, ,

## 2024-05-09 ENCOUNTER — HOSPITAL ENCOUNTER (OUTPATIENT)
Facility: HOSPITAL | Age: 54
Setting detail: SPECIMEN
Discharge: HOME | End: 2024-05-09
Payer: COMMERCIAL

## 2024-05-09 DIAGNOSIS — E11.65 TYPE 2 DIABETES MELLITUS WITH HYPERGLYCEMIA, WITHOUT LONG-TERM CURRENT USE OF INSULIN (HCC): ICD-10-CM

## 2024-05-09 LAB
ALBUMIN SERPL-MCNC: 4 G/DL (ref 3.4–5)
ALBUMIN/GLOB SERPL: 1.2 (ref 0.8–1.7)
ALP SERPL-CCNC: 260 U/L (ref 45–117)
ALT SERPL-CCNC: 44 U/L (ref 13–56)
ANION GAP SERPL CALC-SCNC: 4 MMOL/L (ref 3–18)
AST SERPL-CCNC: 35 U/L (ref 10–38)
BILIRUB SERPL-MCNC: 0.6 MG/DL (ref 0.2–1)
BUN SERPL-MCNC: 17 MG/DL (ref 7–18)
BUN/CREAT SERPL: 19 (ref 12–20)
CALCIUM SERPL-MCNC: 9.5 MG/DL (ref 8.5–10.1)
CHLORIDE SERPL-SCNC: 101 MMOL/L (ref 100–111)
CHOLEST SERPL-MCNC: 192 MG/DL
CO2 SERPL-SCNC: 31 MMOL/L (ref 21–32)
CREAT SERPL-MCNC: 0.91 MG/DL (ref 0.6–1.3)
EST. AVERAGE GLUCOSE BLD GHB EST-MCNC: 128 MG/DL
GLOBULIN SER CALC-MCNC: 3.4 G/DL (ref 2–4)
GLUCOSE SERPL-MCNC: 85 MG/DL (ref 74–99)
HBA1C MFR BLD: 6.1 % (ref 4.2–5.6)
HDLC SERPL-MCNC: 81 MG/DL (ref 40–60)
HDLC SERPL: 2.4 (ref 0–5)
LDLC SERPL CALC-MCNC: 99.8 MG/DL (ref 0–100)
LIPID PANEL: ABNORMAL
POTASSIUM SERPL-SCNC: 4.5 MMOL/L (ref 3.5–5.5)
PROT SERPL-MCNC: 7.4 G/DL (ref 6.4–8.2)
SODIUM SERPL-SCNC: 136 MMOL/L (ref 136–145)
TRIGL SERPL-MCNC: 56 MG/DL
TSH SERPL DL<=0.05 MIU/L-ACNC: 0.41 UIU/ML (ref 0.36–3.74)
VLDLC SERPL CALC-MCNC: 11.2 MG/DL

## 2024-05-09 PROCEDURE — 80061 LIPID PANEL: CPT

## 2024-05-09 PROCEDURE — 36415 COLL VENOUS BLD VENIPUNCTURE: CPT

## 2024-05-09 PROCEDURE — 84443 ASSAY THYROID STIM HORMONE: CPT

## 2024-05-09 PROCEDURE — 80053 COMPREHEN METABOLIC PANEL: CPT

## 2024-05-09 PROCEDURE — 83036 HEMOGLOBIN GLYCOSYLATED A1C: CPT

## 2024-05-15 NOTE — RESULT ENCOUNTER NOTE
A1c has increased.  A lower carbohydrate, lower sugar diet is advised with exercise as tolerated.  Cholesterol has improved.

## 2024-07-27 DIAGNOSIS — G50.0 TRIGEMINAL NEURALGIA: ICD-10-CM

## 2024-07-28 RX ORDER — CARBAMAZEPINE 200 MG/1
200 TABLET ORAL 2 TIMES DAILY
Qty: 180 TABLET | Refills: 1 | Status: SHIPPED | OUTPATIENT
Start: 2024-07-28

## 2024-09-05 ENCOUNTER — HOSPITAL ENCOUNTER (OUTPATIENT)
Facility: HOSPITAL | Age: 54
Discharge: HOME | End: 2024-09-08
Payer: COMMERCIAL

## 2024-09-05 DIAGNOSIS — G89.29 CHRONIC PAIN OF LEFT KNEE: ICD-10-CM

## 2024-09-05 DIAGNOSIS — M25.562 CHRONIC PAIN OF LEFT KNEE: ICD-10-CM

## 2024-09-05 PROCEDURE — 73560 X-RAY EXAM OF KNEE 1 OR 2: CPT

## 2024-09-07 SDOH — ECONOMIC STABILITY: FOOD INSECURITY: WITHIN THE PAST 12 MONTHS, YOU WORRIED THAT YOUR FOOD WOULD RUN OUT BEFORE YOU GOT MONEY TO BUY MORE.: NEVER TRUE

## 2024-09-07 SDOH — ECONOMIC STABILITY: INCOME INSECURITY: HOW HARD IS IT FOR YOU TO PAY FOR THE VERY BASICS LIKE FOOD, HOUSING, MEDICAL CARE, AND HEATING?: NOT VERY HARD

## 2024-09-07 SDOH — ECONOMIC STABILITY: FOOD INSECURITY: WITHIN THE PAST 12 MONTHS, THE FOOD YOU BOUGHT JUST DIDN'T LAST AND YOU DIDN'T HAVE MONEY TO GET MORE.: NEVER TRUE

## 2024-09-09 ENCOUNTER — OFFICE VISIT (OUTPATIENT)
Facility: CLINIC | Age: 54
End: 2024-09-09
Payer: COMMERCIAL

## 2024-09-09 VITALS
OXYGEN SATURATION: 96 % | RESPIRATION RATE: 18 BRPM | HEART RATE: 60 BPM | DIASTOLIC BLOOD PRESSURE: 67 MMHG | WEIGHT: 255 LBS | BODY MASS INDEX: 45.18 KG/M2 | SYSTOLIC BLOOD PRESSURE: 107 MMHG | HEIGHT: 63 IN | TEMPERATURE: 97.5 F

## 2024-09-09 DIAGNOSIS — I10 HYPERTENSION, GOAL BELOW 140/80: Primary | ICD-10-CM

## 2024-09-09 DIAGNOSIS — E11.65 TYPE 2 DIABETES MELLITUS WITH HYPERGLYCEMIA, WITHOUT LONG-TERM CURRENT USE OF INSULIN (HCC): ICD-10-CM

## 2024-09-09 DIAGNOSIS — M17.12 PRIMARY OSTEOARTHRITIS OF LEFT KNEE: ICD-10-CM

## 2024-09-09 PROCEDURE — 3074F SYST BP LT 130 MM HG: CPT | Performed by: FAMILY MEDICINE

## 2024-09-09 PROCEDURE — 3044F HG A1C LEVEL LT 7.0%: CPT | Performed by: FAMILY MEDICINE

## 2024-09-09 PROCEDURE — 3078F DIAST BP <80 MM HG: CPT | Performed by: FAMILY MEDICINE

## 2024-09-09 PROCEDURE — 99214 OFFICE O/P EST MOD 30 MIN: CPT | Performed by: FAMILY MEDICINE

## 2024-09-09 ASSESSMENT — PATIENT HEALTH QUESTIONNAIRE - PHQ9
SUM OF ALL RESPONSES TO PHQ9 QUESTIONS 1 & 2: 0
SUM OF ALL RESPONSES TO PHQ QUESTIONS 1-9: 0
2. FEELING DOWN, DEPRESSED OR HOPELESS: NOT AT ALL
SUM OF ALL RESPONSES TO PHQ QUESTIONS 1-9: 0
1. LITTLE INTEREST OR PLEASURE IN DOING THINGS: NOT AT ALL
SUM OF ALL RESPONSES TO PHQ QUESTIONS 1-9: 0
SUM OF ALL RESPONSES TO PHQ QUESTIONS 1-9: 0

## 2024-09-09 ASSESSMENT — ANXIETY QUESTIONNAIRES
2. NOT BEING ABLE TO STOP OR CONTROL WORRYING: NOT AT ALL
7. FEELING AFRAID AS IF SOMETHING AWFUL MIGHT HAPPEN: NOT AT ALL
IF YOU CHECKED OFF ANY PROBLEMS ON THIS QUESTIONNAIRE, HOW DIFFICULT HAVE THESE PROBLEMS MADE IT FOR YOU TO DO YOUR WORK, TAKE CARE OF THINGS AT HOME, OR GET ALONG WITH OTHER PEOPLE: NOT DIFFICULT AT ALL
3. WORRYING TOO MUCH ABOUT DIFFERENT THINGS: NOT AT ALL
5. BEING SO RESTLESS THAT IT IS HARD TO SIT STILL: NOT AT ALL
4. TROUBLE RELAXING: NOT AT ALL
GAD7 TOTAL SCORE: 0
6. BECOMING EASILY ANNOYED OR IRRITABLE: NOT AT ALL
1. FEELING NERVOUS, ANXIOUS, OR ON EDGE: NOT AT ALL

## 2024-09-16 RX ORDER — METOPROLOL TARTRATE 25 MG/1
TABLET, FILM COATED ORAL
Qty: 90 TABLET | Refills: 3 | Status: SHIPPED | OUTPATIENT
Start: 2024-09-16

## 2024-10-21 ENCOUNTER — TRANSCRIBE ORDERS (OUTPATIENT)
Dept: SCHEDULING | Age: 54
End: 2024-10-21

## 2024-10-21 DIAGNOSIS — Z12.31 VISIT FOR SCREENING MAMMOGRAM: Primary | ICD-10-CM

## 2024-10-21 NOTE — TELEPHONE ENCOUNTER
Patient called to request Refill     Last Appointment: 09/09/2024    Next Appointment: 01/03/2025    Medication: olmesartan-hydroCHLOROthiazide (BENICAR HCT) 20-12.5 MG per tablet [[id number]]     Pharmacy:   [facility]/pharmacy #[id number] - [place], [place] - [address] -  [phone] - F [phone]  [address] [address]  Phone: [phone]  Fax: [phone]

## 2024-10-22 NOTE — TELEPHONE ENCOUNTER
Requested Prescriptions     Pending Prescriptions Disp Refills    olmesartan-hydroCHLOROthiazide (BENICAR HCT) 20-12.5 MG per tablet 90 tablet 1     Sig: Take 1 tablet by mouth daily

## 2024-10-23 ENCOUNTER — OFFICE VISIT (OUTPATIENT)
Age: 54
End: 2024-10-23
Payer: COMMERCIAL

## 2024-10-23 VITALS
OXYGEN SATURATION: 98 % | SYSTOLIC BLOOD PRESSURE: 122 MMHG | HEIGHT: 63 IN | BODY MASS INDEX: 44.65 KG/M2 | WEIGHT: 252 LBS | HEART RATE: 72 BPM | DIASTOLIC BLOOD PRESSURE: 86 MMHG

## 2024-10-23 DIAGNOSIS — I25.10 ATHEROSCLEROSIS OF NATIVE CORONARY ARTERY OF NATIVE HEART WITHOUT ANGINA PECTORIS: Primary | ICD-10-CM

## 2024-10-23 DIAGNOSIS — I24.9 ACUTE ISCHEMIC HEART DISEASE, UNSPECIFIED (HCC): ICD-10-CM

## 2024-10-23 DIAGNOSIS — R07.9 CHEST PAIN, UNSPECIFIED TYPE: ICD-10-CM

## 2024-10-23 PROCEDURE — 3074F SYST BP LT 130 MM HG: CPT | Performed by: INTERNAL MEDICINE

## 2024-10-23 PROCEDURE — 93000 ELECTROCARDIOGRAM COMPLETE: CPT | Performed by: INTERNAL MEDICINE

## 2024-10-23 PROCEDURE — 3079F DIAST BP 80-89 MM HG: CPT | Performed by: INTERNAL MEDICINE

## 2024-10-23 PROCEDURE — 99214 OFFICE O/P EST MOD 30 MIN: CPT | Performed by: INTERNAL MEDICINE

## 2024-10-23 ASSESSMENT — ANXIETY QUESTIONNAIRES
4. TROUBLE RELAXING: NOT AT ALL
5. BEING SO RESTLESS THAT IT IS HARD TO SIT STILL: NOT AT ALL
2. NOT BEING ABLE TO STOP OR CONTROL WORRYING: NOT AT ALL
7. FEELING AFRAID AS IF SOMETHING AWFUL MIGHT HAPPEN: NOT AT ALL
GAD7 TOTAL SCORE: 0
6. BECOMING EASILY ANNOYED OR IRRITABLE: NOT AT ALL
3. WORRYING TOO MUCH ABOUT DIFFERENT THINGS: NOT AT ALL
1. FEELING NERVOUS, ANXIOUS, OR ON EDGE: NOT AT ALL

## 2024-10-23 ASSESSMENT — PATIENT HEALTH QUESTIONNAIRE - PHQ9
SUM OF ALL RESPONSES TO PHQ9 QUESTIONS 1 & 2: 0
2. FEELING DOWN, DEPRESSED OR HOPELESS: NOT AT ALL
SUM OF ALL RESPONSES TO PHQ QUESTIONS 1-9: 0
1. LITTLE INTEREST OR PLEASURE IN DOING THINGS: NOT AT ALL
SUM OF ALL RESPONSES TO PHQ QUESTIONS 1-9: 0

## 2024-10-23 NOTE — PROGRESS NOTES
Jo Ann Turkkins presents today for   Chief Complaint   Patient presents with    Follow-up     Overdue 6 month with no concerns        Jo Ann Rodriguez preferred language for health care discussion is english/other.    Is someone accompanying this pt? no    Is the patient using any DME equipment during OV? no    Depression Screening:  Depression: Not at risk (10/23/2024)    PHQ-2     PHQ-2 Score: 0        Learning Assessment:  Who is the primary learner? Patient    What is the preferred language for health care of the primary learner? ENGLISH    How does the primary learner prefer to learn new concepts? DEMONSTRATION    Answered By patient    Relationship to Learner SELF           Pt currently taking Anticoagulant therapy? no    Pt currently taking Antiplatelet therapy ? ASA 81 mg QD       Coordination of Care:  1. Have you been to the ER, urgent care clinic since your last visit? Hospitalized since your last visit? no    2. Have you seen or consulted any other health care providers outside of the [facility] since your last visit? Include any pap smears or colon screening. no    
Randal, Pulmonology    Acid reflux     CAD (coronary artery disease)     Coronary atherosclerosis of native coronary artery     Diabetes mellitus ([facility]) 14    hgbA1C 6.8    Diabetic eye exam ([facility])     Dyslipidemia     Echocardiogram abnormal 2015    Tech difficult.  EF 50-55%.  No WMA.  Mild conc LVH.  Gr 2 DDfx.  RVSP 45-50 mmHg.  Mild LAE.  Mild LUPE.  Mod TR.  Mild IVCE.      GERD (gastroesophageal reflux disease)     H/O screening mammography 2016    no evidence of malignancy    History of myocardial perfusion scan 2016    High risk.  Mainly fixed anteroapical defect w/partial reversibility.  EF 60%.  No RWMA.  Nondiagnostic EKG on pharm stress test.    Hypercholesterolemia     Hypertension     Hypomagnesemia 14    1.4    Migraine     PAD (peripheral artery disease) ([facility]) 2014    No significant occlusive arterial disease bilaterally.    Pharyngoesophageal dysphagia     Pleural effusion, left     s/p CABG and thoracentesis with removal of 900 mL    Pulmonary arterial hypertension ([facility])     S/P CABG x 1 2011    LIMA-LAD    S/P cardiac cath 2016    R dom.  oLAD 85%.  Otherwise patent coronaries.  JORDAN to LAD patent.  LVEDP 22 mmHg.  EF 55%.  No RWMA.      Trigeminal neuralgia of left side of face     per medical notes epic       Past Surgical History:   Procedure Laterality Date    CARDIAC CATH PROCEDURE  2016          SECTION      COLONOSCOPY N/A 2023    COLONOSCOPY performed by Rolo Morales MD at [facility] ENDOSCOPY    CORONARY ARTERY ANGIOGRAM  2016         CORONARY ARTERY BYPASS GRAFT  12/31/2011    x1    LV ANGIOGRAPHY  2016         [place] UNLISTED PROCEDURE CARDIAC SURGERY      UPPER GASTROINTESTINAL ENDOSCOPY N/A 2023    EGD ESOPHAGOGASTRODUODENOSCOPY DILATION performed by Rolo Morales MD at [facility] ENDOSCOPY    UPPER GASTROINTESTINAL ENDOSCOPY N/A 2024    ESOPHAGOGASTRODUODENOSCOPY DILATION and bx's performed by Rolo Morales MD

## 2024-10-25 ENCOUNTER — HOSPITAL ENCOUNTER (OUTPATIENT)
Dept: WOMENS IMAGING | Facility: HOSPITAL | Age: 54
Discharge: HOME | End: 2024-10-28
Attending: FAMILY MEDICINE
Payer: COMMERCIAL

## 2024-10-25 VITALS — BODY MASS INDEX: 44.65 KG/M2 | WEIGHT: 252 LBS | HEIGHT: 63 IN

## 2024-10-25 DIAGNOSIS — Z12.31 VISIT FOR SCREENING MAMMOGRAM: ICD-10-CM

## 2024-10-25 PROCEDURE — 77067 SCR MAMMO BI INCL CAD: CPT

## 2024-10-25 PROCEDURE — 77063 BREAST TOMOSYNTHESIS BI: CPT

## 2024-10-25 NOTE — TELEPHONE ENCOUNTER
Patient following up on refill request. Patient has been advised of the 7 business day window turn around time and will follow up on Wedneday

## 2024-10-28 RX ORDER — OLMESARTAN MEDOXOMIL AND HYDROCHLOROTHIAZIDE 20/12.5 20; 12.5 MG/1; MG/1
1 TABLET ORAL DAILY
Qty: 90 TABLET | Refills: 1 | Status: SHIPPED | OUTPATIENT
Start: 2024-10-28

## 2025-01-08 ENCOUNTER — TRANSCRIBE ORDERS (OUTPATIENT)
Facility: HOSPITAL | Age: 55
End: 2025-01-08

## 2025-01-08 DIAGNOSIS — G43.019 COMMON MIGRAINE WITH INTRACTABLE MIGRAINE: Primary | ICD-10-CM

## 2025-03-23 RX ORDER — ATORVASTATIN CALCIUM 20 MG/1
20 TABLET, FILM COATED ORAL DAILY
Qty: 90 TABLET | Refills: 3 | Status: SHIPPED | OUTPATIENT
Start: 2025-03-23

## 2025-03-31 RX ORDER — ASPIRIN 81 MG/1
81 TABLET, COATED ORAL DAILY
Qty: 90 TABLET | Refills: 1 | Status: SHIPPED | OUTPATIENT
Start: 2025-03-31

## 2025-04-07 ENCOUNTER — OFFICE VISIT (OUTPATIENT)
Facility: CLINIC | Age: 55
End: 2025-04-07
Payer: COMMERCIAL

## 2025-04-07 VITALS
RESPIRATION RATE: 18 BRPM | HEIGHT: 63 IN | HEART RATE: 68 BPM | SYSTOLIC BLOOD PRESSURE: 99 MMHG | OXYGEN SATURATION: 96 % | DIASTOLIC BLOOD PRESSURE: 70 MMHG | BODY MASS INDEX: 43.02 KG/M2 | TEMPERATURE: 97.5 F | WEIGHT: 242.8 LBS

## 2025-04-07 DIAGNOSIS — E11.65 TYPE 2 DIABETES MELLITUS WITH HYPERGLYCEMIA, WITHOUT LONG-TERM CURRENT USE OF INSULIN (HCC): ICD-10-CM

## 2025-04-07 DIAGNOSIS — G50.0 TRIGEMINAL NEURALGIA: ICD-10-CM

## 2025-04-07 DIAGNOSIS — E83.42 HYPOMAGNESEMIA: ICD-10-CM

## 2025-04-07 DIAGNOSIS — I10 HYPERTENSION, GOAL BELOW 140/80: Primary | ICD-10-CM

## 2025-04-07 DIAGNOSIS — J30.89 SEASONAL ALLERGIC RHINITIS DUE TO OTHER ALLERGIC TRIGGER: ICD-10-CM

## 2025-04-07 PROCEDURE — 3078F DIAST BP <80 MM HG: CPT | Performed by: FAMILY MEDICINE

## 2025-04-07 PROCEDURE — 3074F SYST BP LT 130 MM HG: CPT | Performed by: FAMILY MEDICINE

## 2025-04-07 PROCEDURE — 99214 OFFICE O/P EST MOD 30 MIN: CPT | Performed by: FAMILY MEDICINE

## 2025-04-07 RX ORDER — CARBAMAZEPINE 200 MG/1
200 TABLET ORAL 2 TIMES DAILY
Qty: 180 TABLET | Refills: 1 | Status: SHIPPED | OUTPATIENT
Start: 2025-04-07

## 2025-04-07 RX ORDER — MAGNESIUM OXIDE 400 MG/1
1 TABLET ORAL DAILY
Qty: 90 TABLET | Refills: 3 | Status: SHIPPED | OUTPATIENT
Start: 2025-04-07

## 2025-04-07 RX ORDER — OLMESARTAN MEDOXOMIL AND HYDROCHLOROTHIAZIDE 20/12.5 20; 12.5 MG/1; MG/1
1 TABLET ORAL DAILY
Qty: 90 TABLET | Refills: 1 | Status: SHIPPED | OUTPATIENT
Start: 2025-04-07

## 2025-04-07 RX ORDER — MOMETASONE FUROATE MONOHYDRATE 50 UG/1
2 SPRAY, METERED NASAL DAILY PRN
Qty: 1 EACH | Refills: 2 | Status: SHIPPED | OUTPATIENT
Start: 2025-04-07

## 2025-04-07 RX ORDER — PANTOPRAZOLE SODIUM 40 MG/1
40 TABLET, DELAYED RELEASE ORAL
Qty: 30 TABLET | Refills: 3 | Status: SHIPPED | OUTPATIENT
Start: 2025-04-07

## 2025-04-07 SDOH — ECONOMIC STABILITY: FOOD INSECURITY: WITHIN THE PAST 12 MONTHS, YOU WORRIED THAT YOUR FOOD WOULD RUN OUT BEFORE YOU GOT MONEY TO BUY MORE.: NEVER TRUE

## 2025-04-07 SDOH — ECONOMIC STABILITY: FOOD INSECURITY: WITHIN THE PAST 12 MONTHS, THE FOOD YOU BOUGHT JUST DIDN'T LAST AND YOU DIDN'T HAVE MONEY TO GET MORE.: NEVER TRUE

## 2025-04-07 ASSESSMENT — PATIENT HEALTH QUESTIONNAIRE - PHQ9
1. LITTLE INTEREST OR PLEASURE IN DOING THINGS: NOT AT ALL
SUM OF ALL RESPONSES TO PHQ QUESTIONS 1-9: 0
2. FEELING DOWN, DEPRESSED OR HOPELESS: NOT AT ALL
SUM OF ALL RESPONSES TO PHQ QUESTIONS 1-9: 0

## 2025-04-07 ASSESSMENT — ANXIETY QUESTIONNAIRES
6. BECOMING EASILY ANNOYED OR IRRITABLE: NOT AT ALL
5. BEING SO RESTLESS THAT IT IS HARD TO SIT STILL: NOT AT ALL
1. FEELING NERVOUS, ANXIOUS, OR ON EDGE: NOT AT ALL
4. TROUBLE RELAXING: NOT AT ALL
IF YOU CHECKED OFF ANY PROBLEMS ON THIS QUESTIONNAIRE, HOW DIFFICULT HAVE THESE PROBLEMS MADE IT FOR YOU TO DO YOUR WORK, TAKE CARE OF THINGS AT HOME, OR GET ALONG WITH OTHER PEOPLE: NOT DIFFICULT AT ALL
7. FEELING AFRAID AS IF SOMETHING AWFUL MIGHT HAPPEN: NOT AT ALL
2. NOT BEING ABLE TO STOP OR CONTROL WORRYING: NOT AT ALL
GAD7 TOTAL SCORE: 0
3. WORRYING TOO MUCH ABOUT DIFFERENT THINGS: NOT AT ALL

## 2025-04-07 NOTE — PROGRESS NOTES
HPI:  Jo Ann Rodriguez is a 55 y.o. female who presents today with   Chief Complaint   Patient presents with    Hypertension     4 month follow-up     Migraine        History of Present Illness  The patient presents for follow-up on hypertension and migraine.  She also requests an evaluation for  ear pain.     She reports experiencing deep-seated ear pain, which she describes as non-throbbing. She is uncertain if the discomfort is due to her hearing aids or the current weather conditions. This issue has been ongoing for approximately 4 months. She manages her hearing aids through an ENT specialist.    She has diabetes and is currently on Ozempic injections. She reports experiencing constipation regardless of her dietary intake. If the medication is discontinued for a week, the constipation resolves but recurs upon resumption of the medication.    A neurologist has been consulted for her headaches, and a follow-up visit is scheduled for 04/21/2025, during which her MRI results will be reviewed. Headaches predominantly occur at night, often disrupting sleep. Despite trying various prescribed medications, no relief has been found, and she typically endures the pain until it subsides.    Sleep apnea has not been previously discussed. Sleep is frequently interrupted, with only 5 to 6 hours of continuous sleep per night. She also reports snoring and has not undergone a sleep study. Fatigue and stiffness are reported, even after taking multivitamins. She tends to wake up around 3:30 AM to use the bathroom, after which returning to sleep is difficult. This pattern persists even on weekends.    Hot flashes continue to be experienced, with no change in their intensity or frequency.    A history of seasonal allergies is noted, and a refill of Nasonex nasal spray has been requested.  She is overdue for blood work.    Lab Results   Component Value Date    CHOL 192 05/09/2024    TRIG 56 05/09/2024    HDL 81 (H) 05/09/2024    LDL

## 2025-04-07 NOTE — PROGRESS NOTES
\"Have you been to the ER, urgent care clinic since your last visit?  Hospitalized since your last visit?\"    NO    “Have you seen or consulted any other health care providers outside our system since your last visit?”    NO    “Have you had a diabetic eye exam?”    YES - Where: 09/2024- Ophthalmology-  [facility] Nurse/CMA to request most recent records if not in the chart     No diabetic eye exam on file

## 2025-04-07 NOTE — PATIENT INSTRUCTIONS
dip.  Sprinkle sunflower seeds or chopped almonds over salads. Or try adding chopped walnuts or almonds to cooked vegetables.  Try some vegetarian meals using beans and peas. Add garbanzo or kidney beans to salads. Make burritos and tacos with mashed almonte beans or black beans.  Where can you learn more?  Go to https://[email or web address].net/patientEd and enter H967 to learn more about \"DASH Diet: Care Instructions.\"  Current as of: September 7, 2022               Content Version: 13.5  © 0229-0348 [employer].   Care instructions adapted under license by [employer]. If you have questions about a medical condition or this instruction, always ask your healthcare professional. [employer] disclaims any warranty or liability for your use of this information.

## 2025-04-11 RX ORDER — MAGNESIUM OXIDE 400 MG/1
1 TABLET ORAL DAILY
Qty: 90 TABLET | Refills: 3 | OUTPATIENT
Start: 2025-04-11

## 2025-04-23 ENCOUNTER — HOSPITAL ENCOUNTER (OUTPATIENT)
Facility: HOSPITAL | Age: 55
Setting detail: SPECIMEN
Discharge: HOME | End: 2025-04-26
Payer: COMMERCIAL

## 2025-04-23 DIAGNOSIS — J30.89 SEASONAL ALLERGIC RHINITIS DUE TO OTHER ALLERGIC TRIGGER: ICD-10-CM

## 2025-04-23 DIAGNOSIS — E11.65 TYPE 2 DIABETES MELLITUS WITH HYPERGLYCEMIA, WITHOUT LONG-TERM CURRENT USE OF INSULIN (HCC): ICD-10-CM

## 2025-04-23 DIAGNOSIS — I10 HYPERTENSION, GOAL BELOW 140/80: ICD-10-CM

## 2025-04-23 DIAGNOSIS — G50.0 TRIGEMINAL NEURALGIA: ICD-10-CM

## 2025-04-23 DIAGNOSIS — E83.42 HYPOMAGNESEMIA: ICD-10-CM

## 2025-04-23 LAB
25(OH)D3 SERPL-MCNC: 36.9 NG/ML (ref 30–100)
ALBUMIN SERPL-MCNC: 3.8 G/DL (ref 3.4–5)
ALBUMIN/GLOB SERPL: 1.1 (ref 0.8–1.7)
ALP SERPL-CCNC: 278 U/L (ref 45–117)
ALT SERPL-CCNC: 88 U/L (ref 13–56)
ANION GAP SERPL CALC-SCNC: 3 MMOL/L (ref 3–18)
AST SERPL-CCNC: 46 U/L (ref 10–38)
BASOPHILS # BLD: 0.02 K/UL (ref 0–0.1)
BASOPHILS NFR BLD: 0.3 % (ref 0–2)
BILIRUB SERPL-MCNC: 0.4 MG/DL (ref 0.2–1)
BUN SERPL-MCNC: 15 MG/DL (ref 7–18)
BUN/CREAT SERPL: 16 (ref 12–20)
CALCIUM SERPL-MCNC: 9.4 MG/DL (ref 8.5–10.1)
CHLORIDE SERPL-SCNC: 103 MMOL/L (ref 100–111)
CHOLEST SERPL-MCNC: 179 MG/DL
CO2 SERPL-SCNC: 31 MMOL/L (ref 21–32)
CREAT SERPL-MCNC: 0.91 MG/DL (ref 0.6–1.3)
DIFFERENTIAL METHOD BLD: ABNORMAL
EOSINOPHIL # BLD: 0.08 K/UL (ref 0–0.4)
EOSINOPHIL NFR BLD: 1.1 % (ref 0–5)
ERYTHROCYTE [DISTWIDTH] IN BLOOD BY AUTOMATED COUNT: 14.6 % (ref 11.6–14.5)
EST. AVERAGE GLUCOSE BLD GHB EST-MCNC: 117 MG/DL
GLOBULIN SER CALC-MCNC: 3.4 G/DL (ref 2–4)
GLUCOSE SERPL-MCNC: 91 MG/DL (ref 74–99)
HBA1C MFR BLD: 5.7 % (ref 4.2–5.6)
HCT VFR BLD AUTO: 38.3 % (ref 35–45)
HDLC SERPL-MCNC: 85 MG/DL (ref 40–60)
HDLC SERPL: 2.1 (ref 0–5)
HGB BLD-MCNC: 12.3 G/DL (ref 12–16)
IMM GRANULOCYTES # BLD AUTO: 0.01 K/UL (ref 0–0.04)
IMM GRANULOCYTES NFR BLD AUTO: 0.1 % (ref 0–0.5)
LDLC SERPL CALC-MCNC: 83.6 MG/DL (ref 0–100)
LIPID PANEL: ABNORMAL
LYMPHOCYTES # BLD: 2.01 K/UL (ref 0.9–3.6)
LYMPHOCYTES NFR BLD: 28.8 % (ref 21–52)
MCH RBC QN AUTO: 29.1 PG (ref 24–34)
MCHC RBC AUTO-ENTMCNC: 32.1 G/DL (ref 31–37)
MCV RBC AUTO: 90.8 FL (ref 78–100)
MONOCYTES # BLD: 0.4 K/UL (ref 0.05–1.2)
MONOCYTES NFR BLD: 5.7 % (ref 3–10)
NEUTS SEG # BLD: 4.46 K/UL (ref 1.8–8)
NEUTS SEG NFR BLD: 64 % (ref 40–73)
NRBC # BLD: 0 K/UL (ref 0–0.01)
NRBC BLD-RTO: 0 PER 100 WBC
PLATELET # BLD AUTO: 167 K/UL (ref 135–420)
PMV BLD AUTO: 12.4 FL (ref 9.2–11.8)
POTASSIUM SERPL-SCNC: 4.5 MMOL/L (ref 3.5–5.5)
PROT SERPL-MCNC: 7.2 G/DL (ref 6.4–8.2)
RBC # BLD AUTO: 4.22 M/UL (ref 4.2–5.3)
SODIUM SERPL-SCNC: 137 MMOL/L (ref 136–145)
TRIGL SERPL-MCNC: 52 MG/DL
TSH SERPL DL<=0.05 MIU/L-ACNC: 0.62 UIU/ML (ref 0.36–3.74)
VLDLC SERPL CALC-MCNC: 10.4 MG/DL
WBC # BLD AUTO: 7 K/UL (ref 4.6–13.2)

## 2025-04-23 PROCEDURE — 36415 COLL VENOUS BLD VENIPUNCTURE: CPT

## 2025-04-23 PROCEDURE — 82306 VITAMIN D 25 HYDROXY: CPT

## 2025-04-23 PROCEDURE — 84443 ASSAY THYROID STIM HORMONE: CPT

## 2025-04-23 PROCEDURE — 80053 COMPREHEN METABOLIC PANEL: CPT

## 2025-04-23 PROCEDURE — 80061 LIPID PANEL: CPT

## 2025-04-23 PROCEDURE — 85025 COMPLETE CBC W/AUTO DIFF WBC: CPT

## 2025-04-23 PROCEDURE — 83036 HEMOGLOBIN GLYCOSYLATED A1C: CPT

## 2025-04-30 NOTE — TELEPHONE ENCOUNTER
Last Visit: 04/07/2025   Next Appointment: 09/08/2025    Requested Prescriptions     Pending Prescriptions Disp Refills    olmesartan-hydroCHLOROthiazide (BENICAR HCT) 20-12.5 MG per tablet 90 tablet 1     Sig: Take 1 tablet by mouth daily          Attending Only

## 2025-05-02 RX ORDER — OLMESARTAN MEDOXOMIL AND HYDROCHLOROTHIAZIDE 20/12.5 20; 12.5 MG/1; MG/1
1 TABLET ORAL DAILY
Qty: 90 TABLET | Refills: 1 | Status: SHIPPED | OUTPATIENT
Start: 2025-05-02

## 2025-05-26 DIAGNOSIS — E11.65 TYPE 2 DIABETES MELLITUS WITH HYPERGLYCEMIA, WITHOUT LONG-TERM CURRENT USE OF INSULIN (HCC): ICD-10-CM

## 2025-05-29 NOTE — TELEPHONE ENCOUNTER
Last Visit: 04/07/2025   Next Appointment: 09/08/2025    Requested Prescriptions     Pending Prescriptions Disp Refills    OZEMPIC, 2 MG/DOSE, 8 MG/3ML SOPN sc injection [Pharmacy Med Name: OZEMPIC 8 MG/3 ML (2 MG/DOSE)]  4     Sig: INJECT 2 MG INTO THE SKIN ONCE A WEEK.

## 2025-05-30 RX ORDER — SEMAGLUTIDE 2.68 MG/ML
INJECTION, SOLUTION SUBCUTANEOUS
Qty: 3 ML | Refills: 4 | Status: SHIPPED | OUTPATIENT
Start: 2025-05-30

## 2025-07-08 NOTE — TELEPHONE ENCOUNTER
Last Visit: 04/07/2025   Next Appointment: 09/08/2025      Requested Prescriptions     Pending Prescriptions Disp Refills    pantoprazole (PROTONIX) 40 MG tablet [Pharmacy Med Name: PANTOPRAZOLE SOD DR 40 MG TAB] 90 tablet 1     Sig: TAKE 1 TABLET BY MOUTH EVERY DAY BEFORE BREAKFAST

## 2025-07-11 RX ORDER — PANTOPRAZOLE SODIUM 40 MG/1
40 TABLET, DELAYED RELEASE ORAL
Qty: 90 TABLET | Refills: 1 | Status: SHIPPED | OUTPATIENT
Start: 2025-07-11

## 2025-07-28 PROBLEM — Z86.73 HISTORY OF STROKE: Status: ACTIVE | Noted: 2024-03-14

## 2025-08-21 RX ORDER — MOMETASONE FUROATE MONOHYDRATE 50 UG/1
2 SPRAY, METERED NASAL DAILY PRN
Qty: 1 EACH | Refills: 3 | Status: SHIPPED | OUTPATIENT
Start: 2025-08-21

## (undated) DEVICE — STERILE POLYISOPRENE POWDER-FREE SURGICAL GLOVES: Brand: PROTEXIS

## (undated) DEVICE — BITE BLOCK ENDOSCP UNIV AD 6 TO 9.4 MM

## (undated) DEVICE — CANNULA NSL AD TBNG L14FT STD PVC O2 CRV CONN NONFLARED NSL

## (undated) DEVICE — FORCEPS BX L240CM JAW DIA2.8MM L CAP W/ NDL MIC MESH TOOTH

## (undated) DEVICE — UNDERPAD INCONT W23XL36IN STD BLU POLYPR BK FLUF SFT

## (undated) DEVICE — PROCEDURE KIT FLUID MGMT 10 FR CUST MAINFOLD

## (undated) DEVICE — SOLUTION IRRIG 1000ML H2O STRL BLT

## (undated) DEVICE — CATHETER SUCT TR FL TIP 14FR W/ O CTRL

## (undated) DEVICE — CANNULA ORIG TL CLR W FOAM CUSHIONS AND 14FT SUPL TB 3 CHN

## (undated) DEVICE — GAUZE,SPONGE,4"X4",16PLY,STRL,LF,10/TRAY: Brand: MEDLINE

## (undated) DEVICE — SET FLD ADMIN 3 W STPCOCK FIX FEM L BOR 1IN

## (undated) DEVICE — SYRINGE MED 10ML LUERLOCK TIP W/O SFTY DISP

## (undated) DEVICE — BASIN EMSIS 16OZ GRAPHITE PLAS KID SHP MOLD GRAD FOR ORAL

## (undated) DEVICE — YANKAUER,SMOOTH HANDLE,HIGH CAPACITY: Brand: MEDLINE INDUSTRIES, INC.

## (undated) DEVICE — SYRINGE MED 25GA 3ML L5/8IN SUBQ PLAS W/ DETACH NDL SFTY

## (undated) DEVICE — ENDOSCOPY PUMP TUBING/ CAP SET: Brand: ERBE

## (undated) DEVICE — GOWN PLASTIC FILM THMBHKS UNIV BLUE: Brand: CARDINAL HEALTH

## (undated) DEVICE — FORCEPS BX L240CM JAW DIA2.4MM ORNG L CAP W/ NDL DISP RAD

## (undated) DEVICE — LINER SUCT CANSTR 3000CC PLAS SFT PRE ASSEMB W/OUT TBNG W/

## (undated) DEVICE — CATHETER DIAG AD L100CM DIA6FR STD JUDKINS L 4 POLYUR COR

## (undated) DEVICE — SYRINGE MED 50ML LUERSLIP TIP

## (undated) DEVICE — CATHETER ANGIO JR4 STD 0.038 IN 6 FRX100 CM SUPER TORQUE +

## (undated) DEVICE — MEDI-VAC NON-CONDUCTIVE SUCTION TUBING: Brand: CARDINAL HEALTH

## (undated) DEVICE — PRESSURE MONITORING SET: Brand: TRUWAVE

## (undated) DEVICE — PACK PROCEDURE SURG VASC CATH 161 MMC LF

## (undated) DEVICE — SYRINGE 20ML LL S/C 50

## (undated) DEVICE — CATHETER ANGIO IMA 038 AD 6 FRX100 CM SUPER TORQUE +

## (undated) DEVICE — INTRODUCER SHTH 6FR CANN L11CM DIL TIP 35MM GRN TUNGSTEN